# Patient Record
Sex: MALE | Race: WHITE | NOT HISPANIC OR LATINO | Employment: OTHER | URBAN - METROPOLITAN AREA
[De-identification: names, ages, dates, MRNs, and addresses within clinical notes are randomized per-mention and may not be internally consistent; named-entity substitution may affect disease eponyms.]

---

## 2021-05-27 ENCOUNTER — TELEPHONE (OUTPATIENT)
Dept: HEMATOLOGY/ONCOLOGY | Facility: CLINIC | Age: 86
End: 2021-05-27

## 2021-05-28 ENCOUNTER — OFFICE VISIT (OUTPATIENT)
Dept: RADIATION ONCOLOGY | Facility: CLINIC | Age: 86
End: 2021-05-28
Payer: COMMERCIAL

## 2021-05-28 ENCOUNTER — OFFICE VISIT (OUTPATIENT)
Dept: SURGERY | Facility: CLINIC | Age: 86
End: 2021-05-28
Payer: COMMERCIAL

## 2021-05-28 ENCOUNTER — LAB VISIT (OUTPATIENT)
Dept: LAB | Facility: HOSPITAL | Age: 86
End: 2021-05-28
Attending: COLON & RECTAL SURGERY
Payer: COMMERCIAL

## 2021-05-28 VITALS
DIASTOLIC BLOOD PRESSURE: 88 MMHG | WEIGHT: 180.13 LBS | BODY MASS INDEX: 24.4 KG/M2 | SYSTOLIC BLOOD PRESSURE: 179 MMHG | RESPIRATION RATE: 17 BRPM | TEMPERATURE: 97 F | HEIGHT: 72 IN | HEART RATE: 54 BPM

## 2021-05-28 VITALS
BODY MASS INDEX: 24.4 KG/M2 | SYSTOLIC BLOOD PRESSURE: 179 MMHG | WEIGHT: 180.13 LBS | HEART RATE: 54 BPM | HEIGHT: 72 IN | DIASTOLIC BLOOD PRESSURE: 88 MMHG

## 2021-05-28 DIAGNOSIS — C20 RECTAL CANCER: ICD-10-CM

## 2021-05-28 DIAGNOSIS — C20 RECTAL CANCER: Primary | ICD-10-CM

## 2021-05-28 LAB
CEA SERPL-MCNC: 3.5 NG/ML (ref 0–5)
CREAT SERPL-MCNC: 0.9 MG/DL (ref 0.5–1.4)
EST. GFR  (AFRICAN AMERICAN): >60 ML/MIN/1.73 M^2
EST. GFR  (NON AFRICAN AMERICAN): >60 ML/MIN/1.73 M^2

## 2021-05-28 PROCEDURE — 1126F AMNT PAIN NOTED NONE PRSNT: CPT | Mod: S$GLB,,, | Performed by: COLON & RECTAL SURGERY

## 2021-05-28 PROCEDURE — 3288F PR FALLS RISK ASSESSMENT DOCUMENTED: ICD-10-PCS | Mod: CPTII,S$GLB,, | Performed by: COLON & RECTAL SURGERY

## 2021-05-28 PROCEDURE — 1159F MED LIST DOCD IN RCRD: CPT | Mod: S$GLB,,, | Performed by: RADIOLOGY

## 2021-05-28 PROCEDURE — 82378 CARCINOEMBRYONIC ANTIGEN: CPT | Performed by: COLON & RECTAL SURGERY

## 2021-05-28 PROCEDURE — 3288F FALL RISK ASSESSMENT DOCD: CPT | Mod: CPTII,S$GLB,, | Performed by: RADIOLOGY

## 2021-05-28 PROCEDURE — 45330 PR SIGMOIDOSCOPY,DIAG2STIC: ICD-10-PCS | Mod: S$GLB,,, | Performed by: COLON & RECTAL SURGERY

## 2021-05-28 PROCEDURE — 99999 PR PBB SHADOW E&M-EST. PATIENT-LVL III: ICD-10-PCS | Mod: PBBFAC,,, | Performed by: RADIOLOGY

## 2021-05-28 PROCEDURE — 1101F PT FALLS ASSESS-DOCD LE1/YR: CPT | Mod: CPTII,S$GLB,, | Performed by: RADIOLOGY

## 2021-05-28 PROCEDURE — 3288F PR FALLS RISK ASSESSMENT DOCUMENTED: ICD-10-PCS | Mod: CPTII,S$GLB,, | Performed by: RADIOLOGY

## 2021-05-28 PROCEDURE — 99205 PR OFFICE/OUTPT VISIT, NEW, LEVL V, 60-74 MIN: ICD-10-PCS | Mod: 25,S$GLB,, | Performed by: COLON & RECTAL SURGERY

## 2021-05-28 PROCEDURE — 1101F PT FALLS ASSESS-DOCD LE1/YR: CPT | Mod: CPTII,S$GLB,, | Performed by: COLON & RECTAL SURGERY

## 2021-05-28 PROCEDURE — 99999 PR PBB SHADOW E&M-EST. PATIENT-LVL III: CPT | Mod: PBBFAC,,, | Performed by: RADIOLOGY

## 2021-05-28 PROCEDURE — 1101F PR PT FALLS ASSESS DOC 0-1 FALLS W/OUT INJ PAST YR: ICD-10-PCS | Mod: CPTII,S$GLB,, | Performed by: RADIOLOGY

## 2021-05-28 PROCEDURE — 1159F PR MEDICATION LIST DOCUMENTED IN MEDICAL RECORD: ICD-10-PCS | Mod: S$GLB,,, | Performed by: COLON & RECTAL SURGERY

## 2021-05-28 PROCEDURE — 99999 PR PBB SHADOW E&M-EST. PATIENT-LVL III: ICD-10-PCS | Mod: PBBFAC,,, | Performed by: COLON & RECTAL SURGERY

## 2021-05-28 PROCEDURE — 82565 ASSAY OF CREATININE: CPT | Performed by: COLON & RECTAL SURGERY

## 2021-05-28 PROCEDURE — 99999 PR PBB SHADOW E&M-EST. PATIENT-LVL III: CPT | Mod: PBBFAC,,, | Performed by: COLON & RECTAL SURGERY

## 2021-05-28 PROCEDURE — 1101F PR PT FALLS ASSESS DOC 0-1 FALLS W/OUT INJ PAST YR: ICD-10-PCS | Mod: CPTII,S$GLB,, | Performed by: COLON & RECTAL SURGERY

## 2021-05-28 PROCEDURE — 1159F MED LIST DOCD IN RCRD: CPT | Mod: S$GLB,,, | Performed by: COLON & RECTAL SURGERY

## 2021-05-28 PROCEDURE — 99205 OFFICE O/P NEW HI 60 MIN: CPT | Mod: 25,S$GLB,, | Performed by: COLON & RECTAL SURGERY

## 2021-05-28 PROCEDURE — 1126F PR PAIN SEVERITY QUANTIFIED, NO PAIN PRESENT: ICD-10-PCS | Mod: S$GLB,,, | Performed by: COLON & RECTAL SURGERY

## 2021-05-28 PROCEDURE — 99205 PR OFFICE/OUTPT VISIT, NEW, LEVL V, 60-74 MIN: ICD-10-PCS | Mod: S$GLB,,, | Performed by: RADIOLOGY

## 2021-05-28 PROCEDURE — 45330 DIAGNOSTIC SIGMOIDOSCOPY: CPT | Mod: S$GLB,,, | Performed by: COLON & RECTAL SURGERY

## 2021-05-28 PROCEDURE — 1159F PR MEDICATION LIST DOCUMENTED IN MEDICAL RECORD: ICD-10-PCS | Mod: S$GLB,,, | Performed by: RADIOLOGY

## 2021-05-28 PROCEDURE — 36415 COLL VENOUS BLD VENIPUNCTURE: CPT | Performed by: COLON & RECTAL SURGERY

## 2021-05-28 PROCEDURE — 3288F FALL RISK ASSESSMENT DOCD: CPT | Mod: CPTII,S$GLB,, | Performed by: COLON & RECTAL SURGERY

## 2021-05-28 PROCEDURE — 99205 OFFICE O/P NEW HI 60 MIN: CPT | Mod: S$GLB,,, | Performed by: RADIOLOGY

## 2021-05-28 RX ORDER — MECLIZINE HYDROCHLORIDE 25 MG/1
TABLET ORAL
COMMUNITY
End: 2021-06-14

## 2021-05-28 RX ORDER — MIRABEGRON 25 MG/1
TABLET, FILM COATED, EXTENDED RELEASE ORAL
COMMUNITY
End: 2021-06-14

## 2021-05-28 RX ORDER — ATORVASTATIN CALCIUM 40 MG/1
TABLET, FILM COATED ORAL
COMMUNITY
End: 2023-10-12

## 2021-05-28 RX ORDER — OLMESARTAN MEDOXOMIL 40 MG/1
40 TABLET ORAL DAILY
COMMUNITY
Start: 2021-03-27

## 2021-05-28 RX ORDER — METOPROLOL SUCCINATE 50 MG/1
50 TABLET, EXTENDED RELEASE ORAL DAILY
COMMUNITY
Start: 2021-05-11

## 2021-05-28 RX ORDER — SODIUM, POTASSIUM,MAG SULFATES 17.5-3.13G
1 SOLUTION, RECONSTITUTED, ORAL ORAL
COMMUNITY
Start: 2021-04-27 | End: 2021-06-14

## 2021-05-28 RX ORDER — ATORVASTATIN CALCIUM 40 MG/1
TABLET, FILM COATED ORAL
COMMUNITY
Start: 2021-02-07 | End: 2021-06-03 | Stop reason: SDUPTHER

## 2021-05-28 RX ORDER — IPRATROPIUM BROMIDE 42 UG/1
SPRAY, METERED NASAL
COMMUNITY
End: 2021-06-14

## 2021-05-28 RX ORDER — LEVOTHYROXINE SODIUM 50 UG/1
TABLET ORAL
COMMUNITY
End: 2023-10-12

## 2021-05-28 RX ORDER — HYDROCHLOROTHIAZIDE 12.5 MG/1
TABLET ORAL
COMMUNITY
End: 2021-06-14

## 2021-05-28 RX ORDER — ZOLPIDEM TARTRATE 10 MG/1
10 TABLET ORAL NIGHTLY
COMMUNITY
Start: 2021-05-08

## 2021-05-28 RX ORDER — DIGOXIN 250 MCG
TABLET ORAL
COMMUNITY
End: 2021-06-14

## 2021-06-01 ENCOUNTER — HOSPITAL ENCOUNTER (OUTPATIENT)
Dept: RADIOLOGY | Facility: HOSPITAL | Age: 86
Discharge: HOME | End: 2021-06-01
Attending: COLON & RECTAL SURGERY
Payer: COMMERCIAL

## 2021-06-01 DIAGNOSIS — C20 RECTAL CANCER: ICD-10-CM

## 2021-06-01 PROCEDURE — 74177 CT ABD & PELVIS W/CONTRAST: CPT | Mod: TC

## 2021-06-01 PROCEDURE — 74177 CT CHEST ABDOMEN PELVIS WITH CONTRAST (XPD): ICD-10-PCS | Mod: 26,,, | Performed by: RADIOLOGY

## 2021-06-01 PROCEDURE — 25500020 PHARM REV CODE 255: Performed by: COLON & RECTAL SURGERY

## 2021-06-01 PROCEDURE — 72195 MRI RECTAL CANCER WITHOUT CONTRAST: ICD-10-PCS | Mod: 26,,, | Performed by: RADIOLOGY

## 2021-06-01 PROCEDURE — 72195 MRI PELVIS W/O DYE: CPT | Mod: TC

## 2021-06-01 PROCEDURE — 71260 CT THORAX DX C+: CPT | Mod: 26,,, | Performed by: RADIOLOGY

## 2021-06-01 PROCEDURE — 71260 CT CHEST ABDOMEN PELVIS WITH CONTRAST (XPD): ICD-10-PCS | Mod: 26,,, | Performed by: RADIOLOGY

## 2021-06-01 PROCEDURE — 72195 MRI PELVIS W/O DYE: CPT | Mod: 26,,, | Performed by: RADIOLOGY

## 2021-06-01 PROCEDURE — 74177 CT ABD & PELVIS W/CONTRAST: CPT | Mod: 26,,, | Performed by: RADIOLOGY

## 2021-06-01 RX ADMIN — IOHEXOL 100 ML: 350 INJECTION, SOLUTION INTRAVENOUS at 04:06

## 2021-06-01 RX ADMIN — IOHEXOL 15 ML: 350 INJECTION, SOLUTION INTRAVENOUS at 03:06

## 2021-06-03 ENCOUNTER — OFFICE VISIT (OUTPATIENT)
Dept: HEMATOLOGY/ONCOLOGY | Facility: CLINIC | Age: 86
End: 2021-06-03
Payer: COMMERCIAL

## 2021-06-03 VITALS
HEIGHT: 72 IN | BODY MASS INDEX: 24.38 KG/M2 | RESPIRATION RATE: 18 BRPM | HEART RATE: 58 BPM | WEIGHT: 180 LBS | DIASTOLIC BLOOD PRESSURE: 77 MMHG | SYSTOLIC BLOOD PRESSURE: 173 MMHG | OXYGEN SATURATION: 99 % | TEMPERATURE: 98 F

## 2021-06-03 DIAGNOSIS — I10 ESSENTIAL HYPERTENSION: ICD-10-CM

## 2021-06-03 DIAGNOSIS — C20 RECTAL CANCER: Primary | ICD-10-CM

## 2021-06-03 DIAGNOSIS — C77.5 SECONDARY MALIGNANT NEOPLASM OF INTRAPELVIC LYMPH NODES: ICD-10-CM

## 2021-06-03 DIAGNOSIS — D84.81 IMMUNODEFICIENCY SECONDARY TO NEOPLASM: ICD-10-CM

## 2021-06-03 DIAGNOSIS — D49.9 IMMUNODEFICIENCY SECONDARY TO NEOPLASM: ICD-10-CM

## 2021-06-03 PROCEDURE — 1101F PR PT FALLS ASSESS DOC 0-1 FALLS W/OUT INJ PAST YR: ICD-10-PCS | Mod: CPTII,S$GLB,, | Performed by: INTERNAL MEDICINE

## 2021-06-03 PROCEDURE — 1159F MED LIST DOCD IN RCRD: CPT | Mod: S$GLB,,, | Performed by: INTERNAL MEDICINE

## 2021-06-03 PROCEDURE — 1159F PR MEDICATION LIST DOCUMENTED IN MEDICAL RECORD: ICD-10-PCS | Mod: S$GLB,,, | Performed by: INTERNAL MEDICINE

## 2021-06-03 PROCEDURE — 99205 PR OFFICE/OUTPT VISIT, NEW, LEVL V, 60-74 MIN: ICD-10-PCS | Mod: S$GLB,,, | Performed by: INTERNAL MEDICINE

## 2021-06-03 PROCEDURE — 3288F FALL RISK ASSESSMENT DOCD: CPT | Mod: CPTII,S$GLB,, | Performed by: INTERNAL MEDICINE

## 2021-06-03 PROCEDURE — 99205 OFFICE O/P NEW HI 60 MIN: CPT | Mod: S$GLB,,, | Performed by: INTERNAL MEDICINE

## 2021-06-03 PROCEDURE — 1126F AMNT PAIN NOTED NONE PRSNT: CPT | Mod: S$GLB,,, | Performed by: INTERNAL MEDICINE

## 2021-06-03 PROCEDURE — 1126F PR PAIN SEVERITY QUANTIFIED, NO PAIN PRESENT: ICD-10-PCS | Mod: S$GLB,,, | Performed by: INTERNAL MEDICINE

## 2021-06-03 PROCEDURE — 99999 PR PBB SHADOW E&M-EST. PATIENT-LVL IV: CPT | Mod: PBBFAC,,, | Performed by: INTERNAL MEDICINE

## 2021-06-03 PROCEDURE — 3288F PR FALLS RISK ASSESSMENT DOCUMENTED: ICD-10-PCS | Mod: CPTII,S$GLB,, | Performed by: INTERNAL MEDICINE

## 2021-06-03 PROCEDURE — 1101F PT FALLS ASSESS-DOCD LE1/YR: CPT | Mod: CPTII,S$GLB,, | Performed by: INTERNAL MEDICINE

## 2021-06-03 PROCEDURE — 99999 PR PBB SHADOW E&M-EST. PATIENT-LVL IV: ICD-10-PCS | Mod: PBBFAC,,, | Performed by: INTERNAL MEDICINE

## 2021-06-04 ENCOUNTER — TELEPHONE (OUTPATIENT)
Dept: SURGERY | Facility: CLINIC | Age: 86
End: 2021-06-04

## 2021-06-08 ENCOUNTER — HOSPITAL ENCOUNTER (OUTPATIENT)
Dept: RADIATION THERAPY | Facility: HOSPITAL | Age: 86
Discharge: HOME | End: 2021-06-08
Attending: RADIOLOGY
Payer: COMMERCIAL

## 2021-06-08 PROCEDURE — 77014 HC CT GUIDANCE RADIATION THERAPY FLDS PLACEMENT: CPT | Mod: TC | Performed by: RADIOLOGY

## 2021-06-08 PROCEDURE — 77334 PR  RADN TREATMENT AID(S) COMPLX: ICD-10-PCS | Mod: 26,,, | Performed by: RADIOLOGY

## 2021-06-08 PROCEDURE — 77263 THER RADIOLOGY TX PLNG CPLX: CPT | Mod: ,,, | Performed by: RADIOLOGY

## 2021-06-08 PROCEDURE — 77290 THER RAD SIMULAJ FIELD CPLX: CPT | Mod: TC | Performed by: RADIOLOGY

## 2021-06-08 PROCEDURE — 77334 RADIATION TREATMENT AID(S): CPT | Mod: 26,,, | Performed by: RADIOLOGY

## 2021-06-08 PROCEDURE — 77263 PR  RADIATION THERAPY PLAN COMPLEX: ICD-10-PCS | Mod: ,,, | Performed by: RADIOLOGY

## 2021-06-08 PROCEDURE — 77334 RADIATION TREATMENT AID(S): CPT | Mod: TC | Performed by: RADIOLOGY

## 2021-06-09 PROCEDURE — 77301 RADIOTHERAPY DOSE PLAN IMRT: CPT | Mod: 26,,, | Performed by: RADIOLOGY

## 2021-06-09 PROCEDURE — 77301 PR  INTEN MOD RADIOTHER PLAN W/DOSE VOL HIST: ICD-10-PCS | Mod: 26,,, | Performed by: RADIOLOGY

## 2021-06-09 PROCEDURE — 77301 RADIOTHERAPY DOSE PLAN IMRT: CPT | Mod: TC | Performed by: RADIOLOGY

## 2021-06-10 PROCEDURE — 77338 DESIGN MLC DEVICE FOR IMRT: CPT | Mod: 26,,, | Performed by: RADIOLOGY

## 2021-06-10 PROCEDURE — 77300 RADIATION THERAPY DOSE PLAN: CPT | Mod: TC | Performed by: RADIOLOGY

## 2021-06-10 PROCEDURE — 77338 DESIGN MLC DEVICE FOR IMRT: CPT | Mod: TC | Performed by: RADIOLOGY

## 2021-06-10 PROCEDURE — 77338 PR  MLC IMRT DESIGN & CONSTRUCTION PER IMRT PLAN: ICD-10-PCS | Mod: 26,,, | Performed by: RADIOLOGY

## 2021-06-10 PROCEDURE — 77300 RADIATION THERAPY DOSE PLAN: CPT | Mod: 26,,, | Performed by: RADIOLOGY

## 2021-06-10 PROCEDURE — 77300 PR RADIATION THERAPY,DOSIMETRY PLAN: ICD-10-PCS | Mod: 26,,, | Performed by: RADIOLOGY

## 2021-06-14 ENCOUNTER — HOSPITAL ENCOUNTER (OUTPATIENT)
Dept: INTERVENTIONAL RADIOLOGY/VASCULAR | Facility: HOSPITAL | Age: 86
Discharge: HOME | End: 2021-06-14
Attending: INTERNAL MEDICINE
Payer: COMMERCIAL

## 2021-06-14 VITALS
RESPIRATION RATE: 16 BRPM | HEIGHT: 72 IN | BODY MASS INDEX: 23.43 KG/M2 | DIASTOLIC BLOOD PRESSURE: 62 MMHG | TEMPERATURE: 99 F | WEIGHT: 173 LBS | OXYGEN SATURATION: 100 % | HEART RATE: 79 BPM | SYSTOLIC BLOOD PRESSURE: 131 MMHG

## 2021-06-14 DIAGNOSIS — C20 RECTAL CANCER: ICD-10-CM

## 2021-06-14 LAB
ANION GAP SERPL CALC-SCNC: 7 MMOL/L (ref 8–16)
BASOPHILS # BLD AUTO: 0.08 K/UL (ref 0–0.2)
BASOPHILS NFR BLD: 1.6 % (ref 0–1.9)
BUN SERPL-MCNC: 9 MG/DL (ref 8–23)
CALCIUM SERPL-MCNC: 8.7 MG/DL (ref 8.7–10.5)
CHLORIDE SERPL-SCNC: 105 MMOL/L (ref 95–110)
CO2 SERPL-SCNC: 29 MMOL/L (ref 23–29)
CREAT SERPL-MCNC: 1 MG/DL (ref 0.5–1.4)
DIFFERENTIAL METHOD: ABNORMAL
EOSINOPHIL # BLD AUTO: 0.3 K/UL (ref 0–0.5)
EOSINOPHIL NFR BLD: 5 % (ref 0–8)
ERYTHROCYTE [DISTWIDTH] IN BLOOD BY AUTOMATED COUNT: 12.8 % (ref 11.5–14.5)
EST. GFR  (AFRICAN AMERICAN): >60 ML/MIN/1.73 M^2
EST. GFR  (NON AFRICAN AMERICAN): >60 ML/MIN/1.73 M^2
GLUCOSE SERPL-MCNC: 118 MG/DL (ref 70–110)
HCT VFR BLD AUTO: 45.5 % (ref 40–54)
HGB BLD-MCNC: 15 G/DL (ref 14–18)
IMM GRANULOCYTES # BLD AUTO: 0.02 K/UL (ref 0–0.04)
IMM GRANULOCYTES NFR BLD AUTO: 0.4 % (ref 0–0.5)
INR PPP: 1.1 (ref 0.8–1.2)
LYMPHOCYTES # BLD AUTO: 0.6 K/UL (ref 1–4.8)
LYMPHOCYTES NFR BLD: 12.6 % (ref 18–48)
MCH RBC QN AUTO: 30.7 PG (ref 27–31)
MCHC RBC AUTO-ENTMCNC: 33 G/DL (ref 32–36)
MCV RBC AUTO: 93 FL (ref 82–98)
MONOCYTES # BLD AUTO: 0.8 K/UL (ref 0.3–1)
MONOCYTES NFR BLD: 15.8 % (ref 4–15)
NEUTROPHILS # BLD AUTO: 3.2 K/UL (ref 1.8–7.7)
NEUTROPHILS NFR BLD: 64.6 % (ref 38–73)
NRBC BLD-RTO: 0 /100 WBC
PLATELET # BLD AUTO: 220 K/UL (ref 150–450)
PLATELET BLD QL SMEAR: ABNORMAL
PMV BLD AUTO: ABNORMAL FL (ref 9.2–12.9)
POIKILOCYTOSIS BLD QL SMEAR: SLIGHT
POTASSIUM SERPL-SCNC: 4.1 MMOL/L (ref 3.5–5.1)
PROTHROMBIN TIME: 11.2 SEC (ref 9–12.5)
RBC # BLD AUTO: 4.88 M/UL (ref 4.6–6.2)
SODIUM SERPL-SCNC: 141 MMOL/L (ref 136–145)
WBC # BLD AUTO: 5.01 K/UL (ref 3.9–12.7)

## 2021-06-14 PROCEDURE — C1894 INTRO/SHEATH, NON-LASER: HCPCS

## 2021-06-14 PROCEDURE — 99152 MOD SED SAME PHYS/QHP 5/>YRS: CPT | Mod: ,,, | Performed by: RADIOLOGY

## 2021-06-14 PROCEDURE — 63600175 PHARM REV CODE 636 W HCPCS: Performed by: RADIOLOGY

## 2021-06-14 PROCEDURE — 99153 MOD SED SAME PHYS/QHP EA: CPT | Performed by: RADIOLOGY

## 2021-06-14 PROCEDURE — 80048 BASIC METABOLIC PNL TOTAL CA: CPT | Performed by: INTERNAL MEDICINE

## 2021-06-14 PROCEDURE — 85025 COMPLETE CBC W/AUTO DIFF WBC: CPT | Performed by: INTERNAL MEDICINE

## 2021-06-14 PROCEDURE — 99152 MOD SED SAME PHYS/QHP 5/>YRS: CPT

## 2021-06-14 PROCEDURE — 36561 INSERT TUNNELED CV CATH: CPT | Performed by: RADIOLOGY

## 2021-06-14 PROCEDURE — 99152 PR MOD CONSCIOUS SEDATION, SAME PHYS, 5+ YRS, FIRST 15 MIN: ICD-10-PCS | Mod: ,,, | Performed by: RADIOLOGY

## 2021-06-14 PROCEDURE — 36561 IR TUNNELED CATH PLACEMENT WITH PORT: ICD-10-PCS | Mod: RT,,, | Performed by: RADIOLOGY

## 2021-06-14 PROCEDURE — 76937 US GUIDE VASCULAR ACCESS: CPT | Mod: TC | Performed by: RADIOLOGY

## 2021-06-14 PROCEDURE — A4550 SURGICAL TRAYS: HCPCS

## 2021-06-14 PROCEDURE — 99153 MOD SED SAME PHYS/QHP EA: CPT

## 2021-06-14 PROCEDURE — 85610 PROTHROMBIN TIME: CPT | Performed by: INTERNAL MEDICINE

## 2021-06-14 PROCEDURE — 76937 US GUIDE VASCULAR ACCESS: CPT | Mod: 26,,, | Performed by: RADIOLOGY

## 2021-06-14 PROCEDURE — 25000003 PHARM REV CODE 250: Performed by: RADIOLOGY

## 2021-06-14 PROCEDURE — 77001 FLUOROGUIDE FOR VEIN DEVICE: CPT | Mod: TC | Performed by: RADIOLOGY

## 2021-06-14 PROCEDURE — 76937 PR  US GUIDE, VASCULAR ACCESS: ICD-10-PCS | Mod: 26,,, | Performed by: RADIOLOGY

## 2021-06-14 PROCEDURE — 36415 COLL VENOUS BLD VENIPUNCTURE: CPT | Performed by: INTERNAL MEDICINE

## 2021-06-14 PROCEDURE — 77001 FLUOROGUIDE FOR VEIN DEVICE: CPT | Mod: 26,,, | Performed by: RADIOLOGY

## 2021-06-14 PROCEDURE — 99152 MOD SED SAME PHYS/QHP 5/>YRS: CPT | Performed by: RADIOLOGY

## 2021-06-14 PROCEDURE — 77001 CHG FLUOROGUIDE CNTRL VEN ACCESS,PLACE,REPLACE,REMOVE: ICD-10-PCS | Mod: 26,,, | Performed by: RADIOLOGY

## 2021-06-14 RX ORDER — MORPHINE SULFATE 10 MG/ML
2 INJECTION, SOLUTION INTRAMUSCULAR; INTRAVENOUS
Status: DISCONTINUED | OUTPATIENT
Start: 2021-06-14 | End: 2021-06-15 | Stop reason: HOSPADM

## 2021-06-14 RX ORDER — CEFAZOLIN SODIUM 2 G/50ML
2 SOLUTION INTRAVENOUS
Status: COMPLETED | OUTPATIENT
Start: 2021-06-14 | End: 2021-06-14

## 2021-06-14 RX ORDER — LIDOCAINE HYDROCHLORIDE 10 MG/ML
INJECTION INFILTRATION; PERINEURAL CODE/TRAUMA/SEDATION MEDICATION
Status: COMPLETED | OUTPATIENT
Start: 2021-06-14 | End: 2021-06-14

## 2021-06-14 RX ORDER — HEPARIN 100 UNIT/ML
SYRINGE INTRAVENOUS CODE/TRAUMA/SEDATION MEDICATION
Status: COMPLETED | OUTPATIENT
Start: 2021-06-14 | End: 2021-06-14

## 2021-06-14 RX ORDER — SODIUM CHLORIDE 9 MG/ML
INJECTION, SOLUTION INTRAVENOUS CONTINUOUS
Status: DISCONTINUED | OUTPATIENT
Start: 2021-06-14 | End: 2021-06-15 | Stop reason: HOSPADM

## 2021-06-14 RX ORDER — FENTANYL CITRATE 50 UG/ML
INJECTION, SOLUTION INTRAMUSCULAR; INTRAVENOUS CODE/TRAUMA/SEDATION MEDICATION
Status: COMPLETED | OUTPATIENT
Start: 2021-06-14 | End: 2021-06-14

## 2021-06-14 RX ORDER — MIDAZOLAM HYDROCHLORIDE 5 MG/ML
INJECTION INTRAMUSCULAR; INTRAVENOUS CODE/TRAUMA/SEDATION MEDICATION
Status: COMPLETED | OUTPATIENT
Start: 2021-06-14 | End: 2021-06-14

## 2021-06-14 RX ORDER — HYDROCODONE BITARTRATE AND ACETAMINOPHEN 5; 325 MG/1; MG/1
1 TABLET ORAL EVERY 4 HOURS PRN
Status: DISCONTINUED | OUTPATIENT
Start: 2021-06-14 | End: 2021-06-15 | Stop reason: HOSPADM

## 2021-06-14 RX ADMIN — FENTANYL CITRATE 50 MCG: 50 INJECTION, SOLUTION INTRAMUSCULAR; INTRAVENOUS at 09:06

## 2021-06-14 RX ADMIN — HEPARIN 5 ML: 100 SYRINGE at 10:06

## 2021-06-14 RX ADMIN — CEFAZOLIN SODIUM 2 G: 2 SOLUTION INTRAVENOUS at 08:06

## 2021-06-14 RX ADMIN — SODIUM CHLORIDE 20 ML/HR: 0.9 INJECTION, SOLUTION INTRAVENOUS at 08:06

## 2021-06-14 RX ADMIN — MIDAZOLAM HYDROCHLORIDE 1 MG: 5 INJECTION, SOLUTION INTRAMUSCULAR; INTRAVENOUS at 09:06

## 2021-06-14 RX ADMIN — LIDOCAINE HYDROCHLORIDE 10 ML: 10 INJECTION, SOLUTION INFILTRATION; PERINEURAL at 09:06

## 2021-06-15 PROCEDURE — 77014 PR  CT GUIDANCE PLACEMENT RAD THERAPY FIELDS: ICD-10-PCS | Mod: 26,,, | Performed by: RADIOLOGY

## 2021-06-15 PROCEDURE — 77014 HC CT GUIDANCE RADIATION THERAPY FLDS PLACEMENT: CPT | Mod: TC | Performed by: RADIOLOGY

## 2021-06-15 PROCEDURE — 77014 PR  CT GUIDANCE PLACEMENT RAD THERAPY FIELDS: CPT | Mod: 26,,, | Performed by: RADIOLOGY

## 2021-06-15 PROCEDURE — 77386 HC IMRT, COMPLEX: CPT | Performed by: RADIOLOGY

## 2021-06-16 PROCEDURE — 77014 PR  CT GUIDANCE PLACEMENT RAD THERAPY FIELDS: CPT | Mod: 26,,, | Performed by: RADIOLOGY

## 2021-06-16 PROCEDURE — 77386 HC IMRT, COMPLEX: CPT | Performed by: RADIOLOGY

## 2021-06-16 PROCEDURE — 77014 HC CT GUIDANCE RADIATION THERAPY FLDS PLACEMENT: CPT | Mod: TC | Performed by: RADIOLOGY

## 2021-06-16 PROCEDURE — 77014 PR  CT GUIDANCE PLACEMENT RAD THERAPY FIELDS: ICD-10-PCS | Mod: 26,,, | Performed by: RADIOLOGY

## 2021-06-17 PROCEDURE — 77014 PR  CT GUIDANCE PLACEMENT RAD THERAPY FIELDS: ICD-10-PCS | Mod: 26,,, | Performed by: RADIOLOGY

## 2021-06-17 PROCEDURE — 77014 HC CT GUIDANCE RADIATION THERAPY FLDS PLACEMENT: CPT | Mod: TC | Performed by: RADIOLOGY

## 2021-06-17 PROCEDURE — 77386 HC IMRT, COMPLEX: CPT | Performed by: RADIOLOGY

## 2021-06-17 PROCEDURE — 77014 PR  CT GUIDANCE PLACEMENT RAD THERAPY FIELDS: CPT | Mod: 26,,, | Performed by: RADIOLOGY

## 2021-06-18 PROCEDURE — 77014 PR  CT GUIDANCE PLACEMENT RAD THERAPY FIELDS: CPT | Mod: 26,,, | Performed by: RADIOLOGY

## 2021-06-18 PROCEDURE — 77386 HC IMRT, COMPLEX: CPT | Performed by: RADIOLOGY

## 2021-06-18 PROCEDURE — 77014 HC CT GUIDANCE RADIATION THERAPY FLDS PLACEMENT: CPT | Mod: TC | Performed by: RADIOLOGY

## 2021-06-18 PROCEDURE — 77014 PR  CT GUIDANCE PLACEMENT RAD THERAPY FIELDS: ICD-10-PCS | Mod: 26,,, | Performed by: RADIOLOGY

## 2021-06-21 PROCEDURE — 77014 PR  CT GUIDANCE PLACEMENT RAD THERAPY FIELDS: CPT | Mod: 26,,, | Performed by: RADIOLOGY

## 2021-06-21 PROCEDURE — 77014 PR  CT GUIDANCE PLACEMENT RAD THERAPY FIELDS: ICD-10-PCS | Mod: 26,,, | Performed by: RADIOLOGY

## 2021-06-21 PROCEDURE — 77014 HC CT GUIDANCE RADIATION THERAPY FLDS PLACEMENT: CPT | Mod: TC | Performed by: RADIOLOGY

## 2021-06-21 PROCEDURE — 77386 HC IMRT, COMPLEX: CPT | Performed by: RADIOLOGY

## 2021-06-22 PROCEDURE — 77336 RADIATION PHYSICS CONSULT: CPT | Performed by: RADIOLOGY

## 2021-06-28 ENCOUNTER — TELEPHONE (OUTPATIENT)
Dept: RADIATION ONCOLOGY | Facility: CLINIC | Age: 86
End: 2021-06-28

## 2021-07-09 ENCOUNTER — TELEPHONE (OUTPATIENT)
Dept: RADIATION ONCOLOGY | Facility: CLINIC | Age: 86
End: 2021-07-09

## 2021-07-18 ENCOUNTER — PATIENT MESSAGE (OUTPATIENT)
Dept: RADIATION ONCOLOGY | Facility: CLINIC | Age: 86
End: 2021-07-18

## 2021-08-31 ENCOUNTER — PATIENT MESSAGE (OUTPATIENT)
Dept: HEMATOLOGY/ONCOLOGY | Facility: CLINIC | Age: 86
End: 2021-08-31

## 2021-11-24 ENCOUNTER — TELEPHONE (OUTPATIENT)
Dept: SURGERY | Facility: CLINIC | Age: 86
End: 2021-11-24
Payer: COMMERCIAL

## 2021-12-15 ENCOUNTER — OFFICE VISIT (OUTPATIENT)
Dept: SURGERY | Facility: CLINIC | Age: 86
End: 2021-12-15
Payer: COMMERCIAL

## 2021-12-15 VITALS
SYSTOLIC BLOOD PRESSURE: 149 MMHG | HEART RATE: 57 BPM | HEIGHT: 72 IN | DIASTOLIC BLOOD PRESSURE: 66 MMHG | WEIGHT: 157.19 LBS | BODY MASS INDEX: 21.29 KG/M2

## 2021-12-15 DIAGNOSIS — C20 RECTAL CANCER: Primary | ICD-10-CM

## 2021-12-15 PROCEDURE — 99215 PR OFFICE/OUTPT VISIT, EST, LEVL V, 40-54 MIN: ICD-10-PCS | Mod: 25,S$GLB,, | Performed by: COLON & RECTAL SURGERY

## 2021-12-15 PROCEDURE — 99215 OFFICE O/P EST HI 40 MIN: CPT | Mod: 25,S$GLB,, | Performed by: COLON & RECTAL SURGERY

## 2021-12-15 PROCEDURE — 99999 PR PBB SHADOW E&M-EST. PATIENT-LVL III: ICD-10-PCS | Mod: PBBFAC,,, | Performed by: COLON & RECTAL SURGERY

## 2021-12-15 PROCEDURE — 45330 DIAGNOSTIC SIGMOIDOSCOPY: CPT | Mod: S$GLB,,, | Performed by: COLON & RECTAL SURGERY

## 2021-12-15 PROCEDURE — 99999 PR PBB SHADOW E&M-EST. PATIENT-LVL III: CPT | Mod: PBBFAC,,, | Performed by: COLON & RECTAL SURGERY

## 2021-12-15 PROCEDURE — 45330 PR SIGMOIDOSCOPY,DIAG2STIC: ICD-10-PCS | Mod: S$GLB,,, | Performed by: COLON & RECTAL SURGERY

## 2021-12-22 ENCOUNTER — OFFICE VISIT (OUTPATIENT)
Dept: SURGERY | Facility: CLINIC | Age: 86
End: 2021-12-22
Payer: COMMERCIAL

## 2021-12-22 ENCOUNTER — PATIENT MESSAGE (OUTPATIENT)
Dept: SURGERY | Facility: CLINIC | Age: 86
End: 2021-12-22
Payer: COMMERCIAL

## 2021-12-22 VITALS
HEIGHT: 72 IN | WEIGHT: 155 LBS | DIASTOLIC BLOOD PRESSURE: 65 MMHG | BODY MASS INDEX: 20.99 KG/M2 | SYSTOLIC BLOOD PRESSURE: 122 MMHG

## 2021-12-22 DIAGNOSIS — C20 RECTAL CANCER: Primary | ICD-10-CM

## 2021-12-22 PROCEDURE — 3078F PR MOST RECENT DIASTOLIC BLOOD PRESSURE < 80 MM HG: ICD-10-PCS | Mod: CPTII,S$GLB,, | Performed by: COLON & RECTAL SURGERY

## 2021-12-22 PROCEDURE — 99214 OFFICE O/P EST MOD 30 MIN: CPT | Mod: S$GLB,,, | Performed by: COLON & RECTAL SURGERY

## 2021-12-22 PROCEDURE — 1126F PR PAIN SEVERITY QUANTIFIED, NO PAIN PRESENT: ICD-10-PCS | Mod: CPTII,S$GLB,, | Performed by: COLON & RECTAL SURGERY

## 2021-12-22 PROCEDURE — 3074F PR MOST RECENT SYSTOLIC BLOOD PRESSURE < 130 MM HG: ICD-10-PCS | Mod: CPTII,S$GLB,, | Performed by: COLON & RECTAL SURGERY

## 2021-12-22 PROCEDURE — 1101F PT FALLS ASSESS-DOCD LE1/YR: CPT | Mod: CPTII,S$GLB,, | Performed by: COLON & RECTAL SURGERY

## 2021-12-22 PROCEDURE — 1126F AMNT PAIN NOTED NONE PRSNT: CPT | Mod: CPTII,S$GLB,, | Performed by: COLON & RECTAL SURGERY

## 2021-12-22 PROCEDURE — 99214 PR OFFICE/OUTPT VISIT, EST, LEVL IV, 30-39 MIN: ICD-10-PCS | Mod: S$GLB,,, | Performed by: COLON & RECTAL SURGERY

## 2021-12-22 PROCEDURE — 3074F SYST BP LT 130 MM HG: CPT | Mod: CPTII,S$GLB,, | Performed by: COLON & RECTAL SURGERY

## 2021-12-22 PROCEDURE — 3078F DIAST BP <80 MM HG: CPT | Mod: CPTII,S$GLB,, | Performed by: COLON & RECTAL SURGERY

## 2021-12-22 PROCEDURE — 99999 PR PBB SHADOW E&M-EST. PATIENT-LVL III: ICD-10-PCS | Mod: PBBFAC,,, | Performed by: COLON & RECTAL SURGERY

## 2021-12-22 PROCEDURE — 3288F PR FALLS RISK ASSESSMENT DOCUMENTED: ICD-10-PCS | Mod: CPTII,S$GLB,, | Performed by: COLON & RECTAL SURGERY

## 2021-12-22 PROCEDURE — 3288F FALL RISK ASSESSMENT DOCD: CPT | Mod: CPTII,S$GLB,, | Performed by: COLON & RECTAL SURGERY

## 2021-12-22 PROCEDURE — 1101F PR PT FALLS ASSESS DOC 0-1 FALLS W/OUT INJ PAST YR: ICD-10-PCS | Mod: CPTII,S$GLB,, | Performed by: COLON & RECTAL SURGERY

## 2021-12-22 PROCEDURE — 99999 PR PBB SHADOW E&M-EST. PATIENT-LVL III: CPT | Mod: PBBFAC,,, | Performed by: COLON & RECTAL SURGERY

## 2021-12-29 ENCOUNTER — DOCUMENTATION ONLY (OUTPATIENT)
Dept: SURGERY | Facility: HOSPITAL | Age: 86
End: 2021-12-29
Payer: COMMERCIAL

## 2022-01-05 ENCOUNTER — OFFICE VISIT (OUTPATIENT)
Dept: SURGERY | Facility: CLINIC | Age: 87
End: 2022-01-05
Payer: COMMERCIAL

## 2022-01-05 ENCOUNTER — PATIENT MESSAGE (OUTPATIENT)
Dept: SURGERY | Facility: CLINIC | Age: 87
End: 2022-01-05
Payer: COMMERCIAL

## 2022-01-05 DIAGNOSIS — C20 RECTAL CANCER: Primary | ICD-10-CM

## 2022-01-05 DIAGNOSIS — Z08 ENCOUNTER FOR FOLLOW-UP SURVEILLANCE OF RECTAL CANCER: ICD-10-CM

## 2022-01-05 DIAGNOSIS — Z85.048 ENCOUNTER FOR FOLLOW-UP SURVEILLANCE OF RECTAL CANCER: ICD-10-CM

## 2022-01-05 PROCEDURE — 99214 OFFICE O/P EST MOD 30 MIN: CPT | Mod: 95,,, | Performed by: COLON & RECTAL SURGERY

## 2022-01-05 PROCEDURE — 99214 PR OFFICE/OUTPT VISIT, EST, LEVL IV, 30-39 MIN: ICD-10-PCS | Mod: 95,,, | Performed by: COLON & RECTAL SURGERY

## 2022-01-05 NOTE — Clinical Note
MRI and flex sigmoidoscopy in 3 months.  CT scan in 6 months and then alternate each imaging every other three months.   CEA every 3 months Flex sigmoidoscopy 3 months.   [place] [facility] [place]

## 2022-01-05 NOTE — PROGRESS NOTES
HPI:  Prosper Soliz is a 86 y.o. male with history of locally advanced rectal adenocarcinoma                     Oncology History      Rectal cancer      5/28/2021 Initial Diagnosis        Rectal cancer         6/1/2021 Cancer Staged        Staging form: Colon and Rectum, AJCC 8th Edition  - Clinical stage from 6/1/2021: Stage IIIB (cT3, cN1b, cM0)         6/15/2021 Notable Event        He was diagnosed after noting increasing stool frequency with fecal urgency as well as BRBPR. He underwent colonoscopy on 5/18/21 which noted a single sessile 6 mm polyp in the hepatic flexure, and ulcerated mass in the proximal rectum and mid rectum between 7-10 cm from the anus with biopsy demonstrating invasive moderately differentiated adenocarcinoma, MMR proficient.  MRI rectum with without contrast done on 06/01/2021 showed low/mid rectal tumor with less than 50% circumferential involvement and extension through the muscularis propria into the mesorectal fat, up to 2 abnormal mesorectal fat lymph nodes.  Patient was clinically diagnosed with cT3 cN1b Mx.  CT chest/abdomen/pelvis with contrast done on 06/01/2021 has shown subcentimeter pulmonary nodules [need f-up], no pathologically enlarged adenopathy within the chest, liver was normal, prominent mesenteric lymph nodes.  Evaluated by Dr. Dominick Boland on 06/03/2021 and noted recommendations for total neoadjuvant therapy with short course RT followed by 9 cycles of FOLFOX [given patient's age noted recommendation to drop bolus 5 FU].  Will need systemic imaging after 4 cycles of chemo.  And CT chest/abdomen and MRI rectum protocol 1 month after completion of FOLFOX.         6/15/2021 - 6/21/2021 Radiation Therapy        Treating physician: Michael Hernandez MD  Total Dose: 25 Gy  Fractions: 5     Treatment Summary  Course: C1 Pelvis 2021     Treatment Site Ref. ID Energy Dose/Fx (Gy) #Fx Dose Correction (Gy) Total Dose (Gy) Start Date End Date Elapsed Days   IM PELVIS PTV fior 6X 5  5 / 5 0 25 6/15/2021 6/21/2021 6          7/7/2021 -  Chemotherapy        Treatment Summary   Plan Name: OP FOLFOX 6 Q2W  Treatment Goal: Curative  Status: Active  Start Date: 7/7/2021  End Date: 11/19/2021 (Planned)  Provider: Nusrat Bar MD  Chemotherapy: fluorouraciL 2,400 mg/m2 = 4,870 mg in sodium chloride 0.9% 101.2 mL chemo infusion, 2,400 mg/m2 = 4,870 mg, Intravenous, Over 46 hours, 5 of 9 cycles  Administration: 4,870 mg (7/7/2021), 4,870 mg (7/20/2021), 4,870 mg (8/16/2021), 4,870 mg (9/8/2021), 4,870 mg (9/22/2021)  leucovorin calcium 400 mg/m2 = 810 mg in dextrose 5 % 290.5 mL infusion, 400 mg/m2 = 810 mg, Intravenous, Clinic/HOD 1 time, 5 of 9 cycles  Administration: 810 mg (7/7/2021), 810 mg (7/20/2021), 810 mg (8/16/2021), 810 mg (9/8/2021), 810 mg (9/22/2021)  oxaliplatin (ELOXATIN) 85 mg/m2 = 173 mg in dextrose 5 % 534.6 mL chemo infusion, 85 mg/m2 = 173 mg, Intravenous, Clinic/HOD 1 time, 5 of 9 cycles  Dose modification: 65 mg/m2 (original dose 85 mg/m2, Cycle 3)  Administration: 173 mg (7/7/2021), 173 mg (7/20/2021), 132 mg (8/16/2021), 132 mg (9/8/2021), 132 mg (9/22/2021)         Secondary malignant neoplasm of intrapelvic lymph nodes      6/3/2021 Initial Diagnosis        Secondary malignant neoplasm of intrapelvic lymph nodes         7/7/2021 -  Chemotherapy        Treatment Summary   Plan Name: OP FOLFOX 6 Q2W  Treatment Goal: Curative  Status: Active  Start Date: 7/7/2021  End Date: 11/19/2021 (Planned)  Provider: Nusrat Bar MD  Chemotherapy: fluorouraciL 2,400 mg/m2 = 4,870 mg in sodium chloride 0.9% 101.2 mL chemo infusion, 2,400 mg/m2 = 4,870 mg, Intravenous, Over 46 hours, 5 of 9 cycles  Administration: 4,870 mg (7/7/2021), 4,870 mg (7/20/2021), 4,870 mg (8/16/2021), 4,870 mg (9/8/2021), 4,870 mg (9/22/2021)  leucovorin calcium 400 mg/m2 = 810 mg in dextrose 5 % 290.5 mL infusion, 400 mg/m2 = 810 mg, Intravenous, [facility] 1 time, 5 of 9 cycles  Administration: 810 mg  (7/7/2021), 810 mg (7/20/2021), 810 mg (8/16/2021), 810 mg (9/8/2021), 810 mg (9/22/2021)  oxaliplatin (ELOXATIN) 85 mg/m2 = 173 mg in dextrose 5 % 534.6 mL chemo infusion, 85 mg/m2 = 173 mg, Intravenous, Clinic/HOD 1 time, 5 of 9 cycles  Dose modification: 65 mg/m2 (original dose 85 mg/m2, Cycle 3)  Administration: 173 mg (7/7/2021), 173 mg (7/20/2021), 132 mg (8/16/2021), 132 mg (9/8/2021), 132 mg (9/22/2021)            Interval hx:        Past Medical History:   Diagnosis Date    Cancer     GERD without esophagitis     Hiatal hernia 05/18/2021    normal mucose in second part of the duodenum     Hypercholesterolemia     Hypertension     Hypothyroidism     Left groin hernia     Prostate cancer 1999    Rectal cancer         Past Surgical History:   Procedure Laterality Date    APPENDECTOMY      COLONOSCOPY W/ BIOPSIES  5.18.21 polyp in hepatic flexure,    ESOPHAGOGASTRODUODENOSCOPY  05/18/2021    POLYPECTOMY  05/21/2021    masss in the proximal rectum and mid rectum ( biopsy)    PROSTATECTOMY  1999       Review of patient's allergies indicates:  No Known Allergies    Family History   Problem Relation Age of Onset    Stroke Mother     Cancer Father     Cancer Brother     Clotting disorder Brother     Coronary artery disease Brother        Social History     Socioeconomic History    Marital status:    Tobacco Use    Smoking status: Never Smoker    Smokeless tobacco: Never Used   Substance and Sexual Activity    Alcohol use: Never    Drug use: Never    Sexual activity: Not Currently       ROS:  GENERAL: No fever, chills, fatigability or weight loss.  Integument: No rashes, redness, icterus  CHEST: Denies WALKER, cyanosis, wheezing, cough and sputum production.  CARDIOVASCULAR: Denies chest pain, PND, orthopnea or reduced exercise tolerance.  GI: Denies abd pain, dysphagia, nausea, vomiting, no hematemesis   : Denies burning on urination, no hematuria, no bacteriuria  MSK: No deformities,  swelling, joint pain swelling  Neurologic: No HAs, seizures, weakness, paresthesias, gait problems    PE:  No exam today.  Virtual visit    Assessment:  Locally advance low rectal CA  Complete clinical response to total neoadjuvant therapy  Excellent performance status    Plan:  Discussed MDT recommendations.    Watch and Wait offered as option to definitive surgical resection.  Pt desires organ preserving therapy and prefers no surgery.    The risk of regrowth was quoted at 25% and they understand this could be local or systemic recurrence and requires close follow up.  He will RTC in 3 months.    Repeat MRI in 3 months with repeat flex sigmoidoscopy        The patient location is: home with son Victor M  The chief complaint leading to consultation is: rectal CA follow up.      Visit type: audiovisual    Face to Face time with patient: 15 min  30 minutes of total time spent on the encounter, which includes face to face time and non-face to face time preparing to see the patient (eg, review of tests), Obtaining and/or reviewing separately obtained history, Documenting clinical information in the electronic or other health record, Independently interpreting results (not separately reported) and communicating results to the patient/family/caregiver, or Care coordination (not separately reported).         Each patient to whom he or she provides medical services by telemedicine is:  (1) informed of the relationship between the physician and patient and the respective role of any other health care provider with respect to management of the patient; and (2) notified that he or she may decline to receive medical services by telemedicine and may withdraw from such care at any time.    Notes: see above

## 2022-01-06 DIAGNOSIS — C20 RECTAL CANCER: Primary | ICD-10-CM

## 2022-01-07 ENCOUNTER — TELEPHONE (OUTPATIENT)
Dept: SURGERY | Facility: CLINIC | Age: 87
End: 2022-01-07
Payer: COMMERCIAL

## 2022-01-07 NOTE — TELEPHONE ENCOUNTER
Informed pt's son I scheduled an MRI and Lab for his next visit in March. Copy of appts mailed to pt also.

## 2022-02-22 DIAGNOSIS — D84.9 IMMUNOSUPPRESSED STATUS: ICD-10-CM

## 2022-03-23 ENCOUNTER — OFFICE VISIT (OUTPATIENT)
Dept: SURGERY | Facility: CLINIC | Age: 87
End: 2022-03-23
Payer: COMMERCIAL

## 2022-03-23 ENCOUNTER — HOSPITAL ENCOUNTER (OUTPATIENT)
Dept: RADIOLOGY | Facility: HOSPITAL | Age: 87
Discharge: HOME | End: 2022-03-23
Attending: COLON & RECTAL SURGERY
Payer: COMMERCIAL

## 2022-03-23 VITALS
HEART RATE: 65 BPM | DIASTOLIC BLOOD PRESSURE: 71 MMHG | HEIGHT: 70 IN | WEIGHT: 162.25 LBS | SYSTOLIC BLOOD PRESSURE: 151 MMHG | BODY MASS INDEX: 23.23 KG/M2

## 2022-03-23 DIAGNOSIS — C20 RECTAL CANCER: ICD-10-CM

## 2022-03-23 DIAGNOSIS — C20 RECTAL CANCER: Primary | ICD-10-CM

## 2022-03-23 PROCEDURE — 88305 TISSUE EXAM BY PATHOLOGIST: ICD-10-PCS | Mod: 26,,, | Performed by: STUDENT IN AN ORGANIZED HEALTH CARE EDUCATION/TRAINING PROGRAM

## 2022-03-23 PROCEDURE — 45331 SIGMOIDOSCOPY AND BIOPSY: CPT | Mod: S$GLB,,, | Performed by: COLON & RECTAL SURGERY

## 2022-03-23 PROCEDURE — 3078F DIAST BP <80 MM HG: CPT | Mod: CPTII,S$GLB,, | Performed by: COLON & RECTAL SURGERY

## 2022-03-23 PROCEDURE — 1126F AMNT PAIN NOTED NONE PRSNT: CPT | Mod: CPTII,S$GLB,, | Performed by: COLON & RECTAL SURGERY

## 2022-03-23 PROCEDURE — 3077F SYST BP >= 140 MM HG: CPT | Mod: CPTII,S$GLB,, | Performed by: COLON & RECTAL SURGERY

## 2022-03-23 PROCEDURE — 99214 PR OFFICE/OUTPT VISIT, EST, LEVL IV, 30-39 MIN: ICD-10-PCS | Mod: 25,S$GLB,, | Performed by: COLON & RECTAL SURGERY

## 2022-03-23 PROCEDURE — 99999 PR PBB SHADOW E&M-EST. PATIENT-LVL III: CPT | Mod: PBBFAC,,, | Performed by: COLON & RECTAL SURGERY

## 2022-03-23 PROCEDURE — 88305 TISSUE EXAM BY PATHOLOGIST: CPT | Mod: 26,,, | Performed by: STUDENT IN AN ORGANIZED HEALTH CARE EDUCATION/TRAINING PROGRAM

## 2022-03-23 PROCEDURE — 88305 TISSUE EXAM BY PATHOLOGIST: CPT | Performed by: STUDENT IN AN ORGANIZED HEALTH CARE EDUCATION/TRAINING PROGRAM

## 2022-03-23 PROCEDURE — 72195 MRI RECTAL CANCER WITHOUT CONTRAST: ICD-10-PCS | Mod: 26,,, | Performed by: RADIOLOGY

## 2022-03-23 PROCEDURE — 99214 OFFICE O/P EST MOD 30 MIN: CPT | Mod: 25,S$GLB,, | Performed by: COLON & RECTAL SURGERY

## 2022-03-23 PROCEDURE — 1101F PT FALLS ASSESS-DOCD LE1/YR: CPT | Mod: CPTII,S$GLB,, | Performed by: COLON & RECTAL SURGERY

## 2022-03-23 PROCEDURE — 1126F PR PAIN SEVERITY QUANTIFIED, NO PAIN PRESENT: ICD-10-PCS | Mod: CPTII,S$GLB,, | Performed by: COLON & RECTAL SURGERY

## 2022-03-23 PROCEDURE — 3288F FALL RISK ASSESSMENT DOCD: CPT | Mod: CPTII,S$GLB,, | Performed by: COLON & RECTAL SURGERY

## 2022-03-23 PROCEDURE — 72195 MRI PELVIS W/O DYE: CPT | Mod: 26,,, | Performed by: RADIOLOGY

## 2022-03-23 PROCEDURE — 99999 PR PBB SHADOW E&M-EST. PATIENT-LVL III: ICD-10-PCS | Mod: PBBFAC,,, | Performed by: COLON & RECTAL SURGERY

## 2022-03-23 PROCEDURE — 72195 MRI PELVIS W/O DYE: CPT | Mod: TC

## 2022-03-23 PROCEDURE — 3077F PR MOST RECENT SYSTOLIC BLOOD PRESSURE >= 140 MM HG: ICD-10-PCS | Mod: CPTII,S$GLB,, | Performed by: COLON & RECTAL SURGERY

## 2022-03-23 PROCEDURE — 3288F PR FALLS RISK ASSESSMENT DOCUMENTED: ICD-10-PCS | Mod: CPTII,S$GLB,, | Performed by: COLON & RECTAL SURGERY

## 2022-03-23 PROCEDURE — 45331 PR SIGMOIDOSCOPY,BIOPSY: ICD-10-PCS | Mod: S$GLB,,, | Performed by: COLON & RECTAL SURGERY

## 2022-03-23 PROCEDURE — 3078F PR MOST RECENT DIASTOLIC BLOOD PRESSURE < 80 MM HG: ICD-10-PCS | Mod: CPTII,S$GLB,, | Performed by: COLON & RECTAL SURGERY

## 2022-03-23 PROCEDURE — 1101F PR PT FALLS ASSESS DOC 0-1 FALLS W/OUT INJ PAST YR: ICD-10-PCS | Mod: CPTII,S$GLB,, | Performed by: COLON & RECTAL SURGERY

## 2022-03-23 NOTE — PROGRESS NOTES
HPI:  88 yo male with locally advanced low rectal cancer.      Status post HAYDEE    Excellent clinical response                                   Oncology History        Rectal cancer        5/28/2021 Initial Diagnosis          Rectal cancer           6/1/2021 Cancer Staged          Staging form: Colon and Rectum, AJCC 8th Edition  - Clinical stage from 6/1/2021: Stage IIIB (cT3, cN1b, cM0)           6/15/2021 Notable Event          He was diagnosed after noting increasing stool frequency with fecal urgency as well as BRBPR. He underwent colonoscopy on 5/18/21 which noted a single sessile 6 mm polyp in the hepatic flexure, and ulcerated mass in the proximal rectum and mid rectum between 7-10 cm from the anus with biopsy demonstrating invasive moderately differentiated adenocarcinoma, MMR proficient.  MRI rectum with without contrast done on 06/01/2021 showed low/mid rectal tumor with less than 50% circumferential involvement and extension through the muscularis propria into the mesorectal fat, up to 2 abnormal mesorectal fat lymph nodes.  Patient was clinically diagnosed with cT3 cN1b Mx.  CT chest/abdomen/pelvis with contrast done on 06/01/2021 has shown subcentimeter pulmonary nodules [need f-up], no pathologically enlarged adenopathy within the chest, liver was normal, prominent mesenteric lymph nodes.  Evaluated by Dr. Dominick Boland on 06/03/2021 and noted recommendations for total neoadjuvant therapy with short course RT followed by 9 cycles of FOLFOX [given patient's age noted recommendation to drop bolus 5 FU].  Will need systemic imaging after 4 cycles of chemo.  And CT chest/abdomen and MRI rectum protocol 1 month after completion of FOLFOX.           6/15/2021 - 6/21/2021 Radiation Therapy          Treating physician: Michael Hernandez MD  Total Dose: 25 Gy  Fractions: 5     Treatment Summary  Course: C1 Pelvis 2021     Treatment Site Ref. ID Energy Dose/Fx (Gy) #Fx Dose Correction (Gy) Total Dose (Gy) Start Date  End Date Elapsed Days   IM PELVIS PTV fior 6X 5 5 / 5 0 25 6/15/2021 6/21/2021 6          7/7/2021 -  Chemotherapy          Treatment Summary   Plan Name: OP FOLFOX 6 Q2W  Treatment Goal: Curative  Status: Active  Start Date: 7/7/2021  End Date: 11/19/2021 (Planned)  Provider: Nusrat Bar MD  Chemotherapy: fluorouraciL 2,400 mg/m2 = 4,870 mg in sodium chloride 0.9% 101.2 mL chemo infusion, 2,400 mg/m2 = 4,870 mg, Intravenous, Over 46 hours, 5 of 9 cycles  Administration: 4,870 mg (7/7/2021), 4,870 mg (7/20/2021), 4,870 mg (8/16/2021), 4,870 mg (9/8/2021), 4,870 mg (9/22/2021)  leucovorin calcium 400 mg/m2 = 810 mg in dextrose 5 % 290.5 mL infusion, 400 mg/m2 = 810 mg, Intravenous, Clinic/HOD 1 time, 5 of 9 cycles  Administration: 810 mg (7/7/2021), 810 mg (7/20/2021), 810 mg (8/16/2021), 810 mg (9/8/2021), 810 mg (9/22/2021)  oxaliplatin (ELOXATIN) 85 mg/m2 = 173 mg in dextrose 5 % 534.6 mL chemo infusion, 85 mg/m2 = 173 mg, Intravenous, Clinic/HOD 1 time, 5 of 9 cycles  Dose modification: 65 mg/m2 (original dose 85 mg/m2, Cycle 3)  Administration: 173 mg (7/7/2021), 173 mg (7/20/2021), 132 mg (8/16/2021), 132 mg (9/8/2021), 132 mg (9/22/2021)           Secondary malignant neoplasm of intrapelvic lymph nodes        6/3/2021 Initial Diagnosis          Secondary malignant neoplasm of intrapelvic lymph nodes           7/7/2021 -  Chemotherapy          Treatment Summary   Plan Name: OP FOLFOX 6 Q2W  Treatment Goal: Curative  Status: Active  Start Date: 7/7/2021  End Date: 11/19/2021 (Planned)  Provider: Nusrat Bar MD  Chemotherapy: fluorouraciL 2,400 mg/m2 = 4,870 mg in sodium chloride 0.9% 101.2 mL chemo infusion, 2,400 mg/m2 = 4,870 mg, Intravenous, Over 46 hours, 5 of 9 cycles  Administration: 4,870 mg (7/7/2021), 4,870 mg (7/20/2021), 4,870 mg (8/16/2021), 4,870 mg (9/8/2021), 4,870 mg (9/22/2021)  leucovorin calcium 400 mg/m2 = 810 mg in dextrose 5 % 290.5 mL infusion, 400 mg/m2 = 810 mg, Intravenous,  Clinic/HOD 1 time, 5 of 9 cycles  Administration: 810 mg (7/7/2021), 810 mg (7/20/2021), 810 mg (8/16/2021), 810 mg (9/8/2021), 810 mg (9/22/2021)  oxaliplatin (ELOXATIN) 85 mg/m2 = 173 mg in dextrose 5 % 534.6 mL chemo infusion, 85 mg/m2 = 173 mg, Intravenous, Clinic/HOD 1 time, 5 of 9 cycles  Dose modification: 65 mg/m2 (original dose 85 mg/m2, Cycle 3)  Administration: 173 mg (7/7/2021), 173 mg (7/20/2021), 132 mg (8/16/2021), 132 mg (9/8/2021), 132 mg (9/22/2021)                Interval hx:   Pt elected watch and wait.  He feels well.  No pain, bleeding or change in bowels.  Appetite and energy levels good.      EXAMINATION:  MRI RECTAL CANCER WITHOUT CONTRAST     CLINICAL HISTORY:  Malignant neoplasm of rectum     TECHNIQUE:  Multiplanar multisequence imaging of the pelvis per rectal cancer restaging protocol.     COMPARISON:  MRI rectal cancer protocol from 12/21/2021, 09/21/2021, and 06/01/2021.     Pretreatment Tumor Staging: [id number]     Prior Treatment: Chemo and radiation therapy     FINDINGS:  Persistent T2 hypointense scar at treatment site in the left anterolateral aspect of the rectum.  No discrete measurable lesion or abnormal diffusion restriction.     TREATED TUMOR/TUMOR BED CHARACTERISTICS:     *DWI - restricted diffusion (with associated low ADC) in tumor or tumor bed: No discrete diffusion restricting lesion.  Slight heterogeneous diffusion restriction about treatment site, favored to represent artifact.     *T2: The primary tumor and extramural disease shows Entirely dark T2 signal/scar     Distance of inferior margin of treated tumor/treated area to the anal verge: 5.5 cm     Distance of inferior margin of treated tumor/treated area to the top of the sphincter complex/anorectal junction: 2.5 cm     Relationship to anterior peritoneal reflection: Below     Craniocaudal length: 2.6 cm     Previous craniocaudal length: 2.5 cm     Maximal wall thickness: 7 mm     Previous wall thickness: 6  mm     LOW RECTAL TUMORS: Invasion of anal sphincter complex: Absent     *EMVI:  No (none evident pre-treatment)     *LYMPH NODES     Mesorectal/superior rectal lymph nodes and/or tumor deposits: Persistent enlarged 6 mm left mesorectal lymph node with somewhat rounded and spiculated morphology (series 10, image 37).  Previously measured 6 mm.  No new suspicious lymph nodes.     Extra mesorectal lymph nodes: No     MISCELLANEOUS: Trace free fluid in the pelvis.  Extensive sigmoid diverticulosis.  Prostate is surgically absent.  Stable T1 hypointense focus in the right iliac bone, possibly represents bone island.     Impression:     *TUMOR     Compared toMRI rectal cancer protocol from 12/21/2021, post treatment primary tumor assessment: Possible complete/near complete response     Good Response-dense fibrosis>75%.  No discrete measurable lesion.  No abnormal diffusion restriction to suggest local recurrence/residual disease.     *NODES     Suspicious Mesorectal Lymph nodes: Yes (persistently enlarged left mesorectal lymph node as above)     Suspicious Extramesorectal Lymph nodes: No     Electronically signed by resident: Luis Fernando Palacio MD  Date:                                            03/23/2022  Time:                                           11:14     Electronically signed by: Luis Fernando Palacio MD  Date:                                            03/23/2022  Time:                                           13:25      Interval hx:    Feels well.  No pain.  BMs good no bleeding.    Appetite and energy levels good    Past Medical History:   Diagnosis Date    Cancer     GERD without esophagitis     Hiatal hernia 05/18/2021    normal mucose in second part of the duodenum     Hypercholesterolemia     Hypertension     Hypothyroidism     Left groin hernia     Prostate cancer 1999    Rectal cancer         Past Surgical History:   Procedure Laterality Date    APPENDECTOMY      COLONOSCOPY W/ BIOPSIES  5.18.21  "polyp in hepatic flexure,    ESOPHAGOGASTRODUODENOSCOPY  05/18/2021    POLYPECTOMY  05/21/2021    masss in the proximal rectum and mid rectum ( biopsy)    PROSTATECTOMY  1999       Review of patient's allergies indicates:  No Known Allergies    Family History   Problem Relation Age of Onset    Stroke Mother     Cancer Father     Cancer Brother     Clotting disorder Brother     Coronary artery disease Brother        Social History     Socioeconomic History    Marital status:    Tobacco Use    Smoking status: Never Smoker    Smokeless tobacco: Never Used   Substance and Sexual Activity    Alcohol use: Never    Drug use: Never    Sexual activity: Not Currently       ROS:  GENERAL: No fever, chills, fatigability or weight loss.  Integument: No rashes, redness, icterus  CHEST: Denies WALKER, cyanosis, wheezing, cough and sputum production.  CARDIOVASCULAR: Denies chest pain, PND, orthopnea or reduced exercise tolerance.  GI: Denies abd pain, dysphagia, nausea, vomiting, no hematemesis   : Denies burning on urination, no hematuria, no bacteriuria  MSK: No deformities, swelling, joint pain swelling  Neurologic: No HAs, seizures, weakness, paresthesias, gait problems    PE:  General appearance well  BP (!) 151/71 (BP Location: Left arm, Patient Position: Sitting, BP Method: Large (Automatic))   Pulse 65   Ht 5' 10" (1.778 m)   Wt 73.6 kg (162 lb 4.1 oz)   BMI 23.28 kg/m²   Sclera/ Skin anicteric  LN none palpable  AT NC EOMI  Neck supple trachea midline   Chest symmetric, nl excursion, no retractions, breathing comfortably  Abdomen  ND soft NT.  no masses, no organomegaly  EXT - no CCE  Neuro:  Mood/ affect nl, alert and oriented x 3, moves all ext's, gait nl    Rectal  Inspection nl  JANKI good tone, mass firm, ulcerated, left anterior - 6 cm from anal verge      Assessment:  Possible recurrence of rectal CA  Good performance status    Plan:  Review pathology  MDT  OV 2 weeks      "

## 2022-04-01 LAB
FINAL PATHOLOGIC DIAGNOSIS: NORMAL
GROSS: NORMAL
Lab: NORMAL
MICROSCOPIC EXAM: NORMAL

## 2022-04-06 ENCOUNTER — DOCUMENTATION ONLY (OUTPATIENT)
Dept: SURGERY | Facility: CLINIC | Age: 87
End: 2022-04-06
Payer: COMMERCIAL

## 2022-04-06 ENCOUNTER — OFFICE VISIT (OUTPATIENT)
Dept: SURGERY | Facility: CLINIC | Age: 87
End: 2022-04-06
Payer: COMMERCIAL

## 2022-04-06 VITALS
HEART RATE: 54 BPM | HEIGHT: 70 IN | BODY MASS INDEX: 22.57 KG/M2 | DIASTOLIC BLOOD PRESSURE: 66 MMHG | WEIGHT: 157.63 LBS | SYSTOLIC BLOOD PRESSURE: 149 MMHG

## 2022-04-06 DIAGNOSIS — C20 RECTAL CANCER: Primary | ICD-10-CM

## 2022-04-06 PROCEDURE — 3078F DIAST BP <80 MM HG: CPT | Mod: CPTII,S$GLB,, | Performed by: COLON & RECTAL SURGERY

## 2022-04-06 PROCEDURE — 3078F PR MOST RECENT DIASTOLIC BLOOD PRESSURE < 80 MM HG: ICD-10-PCS | Mod: CPTII,S$GLB,, | Performed by: COLON & RECTAL SURGERY

## 2022-04-06 PROCEDURE — 1126F PR PAIN SEVERITY QUANTIFIED, NO PAIN PRESENT: ICD-10-PCS | Mod: CPTII,S$GLB,, | Performed by: COLON & RECTAL SURGERY

## 2022-04-06 PROCEDURE — 3288F FALL RISK ASSESSMENT DOCD: CPT | Mod: CPTII,S$GLB,, | Performed by: COLON & RECTAL SURGERY

## 2022-04-06 PROCEDURE — 1159F PR MEDICATION LIST DOCUMENTED IN MEDICAL RECORD: ICD-10-PCS | Mod: CPTII,S$GLB,, | Performed by: COLON & RECTAL SURGERY

## 2022-04-06 PROCEDURE — 1101F PT FALLS ASSESS-DOCD LE1/YR: CPT | Mod: CPTII,S$GLB,, | Performed by: COLON & RECTAL SURGERY

## 2022-04-06 PROCEDURE — 3288F PR FALLS RISK ASSESSMENT DOCUMENTED: ICD-10-PCS | Mod: CPTII,S$GLB,, | Performed by: COLON & RECTAL SURGERY

## 2022-04-06 PROCEDURE — 99214 PR OFFICE/OUTPT VISIT, EST, LEVL IV, 30-39 MIN: ICD-10-PCS | Mod: S$GLB,,, | Performed by: COLON & RECTAL SURGERY

## 2022-04-06 PROCEDURE — 1101F PR PT FALLS ASSESS DOC 0-1 FALLS W/OUT INJ PAST YR: ICD-10-PCS | Mod: CPTII,S$GLB,, | Performed by: COLON & RECTAL SURGERY

## 2022-04-06 PROCEDURE — 99214 OFFICE O/P EST MOD 30 MIN: CPT | Mod: S$GLB,,, | Performed by: COLON & RECTAL SURGERY

## 2022-04-06 PROCEDURE — 1126F AMNT PAIN NOTED NONE PRSNT: CPT | Mod: CPTII,S$GLB,, | Performed by: COLON & RECTAL SURGERY

## 2022-04-06 PROCEDURE — 99999 PR PBB SHADOW E&M-EST. PATIENT-LVL III: CPT | Mod: PBBFAC,,, | Performed by: COLON & RECTAL SURGERY

## 2022-04-06 PROCEDURE — 3077F SYST BP >= 140 MM HG: CPT | Mod: CPTII,S$GLB,, | Performed by: COLON & RECTAL SURGERY

## 2022-04-06 PROCEDURE — 3077F PR MOST RECENT SYSTOLIC BLOOD PRESSURE >= 140 MM HG: ICD-10-PCS | Mod: CPTII,S$GLB,, | Performed by: COLON & RECTAL SURGERY

## 2022-04-06 PROCEDURE — 99999 PR PBB SHADOW E&M-EST. PATIENT-LVL III: ICD-10-PCS | Mod: PBBFAC,,, | Performed by: COLON & RECTAL SURGERY

## 2022-04-06 PROCEDURE — 1159F MED LIST DOCD IN RCRD: CPT | Mod: CPTII,S$GLB,, | Performed by: COLON & RECTAL SURGERY

## 2022-04-06 NOTE — PROVATION PATIENT INSTRUCTIONS
Discharge Summary/Instructions after an Endoscopic Procedure  Patient Name: Prosper Soliz  Patient MRN: [id number]  Patient YOB: 1935 Wednesday, March 23, 2022  Robert Jensen MD  Dear patient,  As a result of recent federal legislation (The Federal Cures Act), you may   receive lab or pathology results from your procedure in your [facility]   account before your physician is able to contact you. Your physician or   their representative will relay the results to you with their   recommendations at their soonest availability.  Thank you,  RESTRICTIONS:  During your procedure today, you received medications for sedation.  These   medications may affect your judgment, balance and coordination.  Therefore,   for 24 hours, you have the following restrictions:   - DO NOT drive a car, operate machinery, make legal/financial decisions,   sign important papers or drink alcohol.    ACTIVITY:  Today: no heavy lifting, straining or running due to procedural   sedation/anesthesia.  The following day: return to full activity including work.  DIET:  Eat and drink normally unless instructed otherwise.     TREATMENT FOR COMMON SIDE EFFECTS:  - Mild abdominal pain, nausea, belching, bloating or excessive gas:  rest,   eat lightly and use a heating pad.  - Sore Throat: treat with throat lozenges and/or gargle with warm salt   water.  - Because air was used during the procedure, expelling large amounts of air   from your rectum or belching is normal.  - If a bowel prep was taken, you may not have a bowel movement for 1-3 days.    This is normal.  SYMPTOMS TO WATCH FOR AND REPORT TO YOUR PHYSICIAN:  1. Abdominal pain or bloating, other than gas cramps.  2. Chest pain.  3. Back pain.  4. Signs of infection such as: chills or fever occurring within 24 hours   after the procedure.  5. Rectal bleeding, which would show as bright red, maroon, or black stools.   (A tablespoon of blood from the rectum is not serious, especially if    hemorrhoids are present.)  6. Vomiting.  7. Weakness or dizziness.  GO DIRECTLY TO THE NEAREST EMERGENCY ROOM IF YOU HAVE ANY OF THE FOLLOWING:      Difficulty breathing              Chills and/or fever over 101 F   Persistent vomiting and/or vomiting blood   Severe abdominal pain   Severe chest pain   Black, tarry stools   Bleeding- more than one tablespoon   Any other symptom or condition that you feel may need urgent attention  Your doctor recommends these additional instructions:  If any biopsies were taken, your doctors clinic will contact you in 1 to 2   weeks with any results.  - Continue present medications.   - Discharge patient to home (ambulatory).   - Patient has a contact number available for emergencies.  The signs and   symptoms of potential delayed complications were discussed with the   patient.  Return to normal activities tomorrow.  Written discharge   instructions were provided to the patient.   - Return to my office in 2 weeks.  For questions, problems or results please call your physician - Robert Jensen MD at Work:  [phone].  [facility] [place], EMERGENCY ROOM PHONE NUMBER: [phone]  IF A COMPLICATION OR EMERGENCY SITUATION ARISES AND YOU ARE UNABLE TO REACH   YOUR PHYSICIAN - GO DIRECTLY TO THE EMERGENCY ROOM.  Robert Jensen MD  4/6/2022 7:51:57 AM  This report has been verified and signed electronically.  Dear patient,  As a result of recent federal legislation (The Federal Cures Act), you may   receive lab or pathology results from your procedure in your [facility]   account before your physician is able to contact you. Your physician or   their representative will relay the results to you with their   recommendations at their soonest availability.  Thank you,  PROVATION

## 2022-04-06 NOTE — PROGRESS NOTES
HPI:  86 yo male with locally advanced low rectal cancer.       Status post HAYDEE     Excellent clinical response                                                    Oncology History         Rectal cancer         5/28/2021 Initial Diagnosis           Rectal cancer            6/1/2021 Cancer Staged           Staging form: Colon and Rectum, AJCC 8th Edition  - Clinical stage from 6/1/2021: Stage IIIB (cT3, cN1b, cM0)            6/15/2021 Notable Event           He was diagnosed after noting increasing stool frequency with fecal urgency as well as BRBPR. He underwent colonoscopy on 5/18/21 which noted a single sessile 6 mm polyp in the hepatic flexure, and ulcerated mass in the proximal rectum and mid rectum between 7-10 cm from the anus with biopsy demonstrating invasive moderately differentiated adenocarcinoma, MMR proficient.  MRI rectum with without contrast done on 06/01/2021 showed low/mid rectal tumor with less than 50% circumferential involvement and extension through the muscularis propria into the mesorectal fat, up to 2 abnormal mesorectal fat lymph nodes.  Patient was clinically diagnosed with cT3 cN1b Mx.  CT chest/abdomen/pelvis with contrast done on 06/01/2021 has shown subcentimeter pulmonary nodules [need f-up], no pathologically enlarged adenopathy within the chest, liver was normal, prominent mesenteric lymph nodes.  Evaluated by Dr. Dominick Boland on 06/03/2021 and noted recommendations for total neoadjuvant therapy with short course RT followed by 9 cycles of FOLFOX [given patient's age noted recommendation to drop bolus 5 FU].  Will need systemic imaging after 4 cycles of chemo.  And CT chest/abdomen and MRI rectum protocol 1 month after completion of FOLFOX.            6/15/2021 - 6/21/2021 Radiation Therapy           Treating physician: Michael Hernandez MD  Total Dose: 25 Gy  Fractions: 5     Treatment Summary  Course: C1 Pelvis 2021     Treatment Site Ref. ID Energy Dose/Fx (Gy) #Fx Dose Correction (Gy)  Total Dose (Gy) Start Date End Date Elapsed Days   IM PELVIS PTV fior 6X 5 5 / 5 0 25 6/15/2021 6/21/2021 6           7/7/2021 -  Chemotherapy           Treatment Summary   Plan Name: OP FOLFOX 6 Q2W  Treatment Goal: Curative  Status: Active  Start Date: 7/7/2021  End Date: 11/19/2021 (Planned)  Provider: Nusrat Bar MD  Chemotherapy: fluorouraciL 2,400 mg/m2 = 4,870 mg in sodium chloride 0.9% 101.2 mL chemo infusion, 2,400 mg/m2 = 4,870 mg, Intravenous, Over 46 hours, 5 of 9 cycles  Administration: 4,870 mg (7/7/2021), 4,870 mg (7/20/2021), 4,870 mg (8/16/2021), 4,870 mg (9/8/2021), 4,870 mg (9/22/2021)  leucovorin calcium 400 mg/m2 = 810 mg in dextrose 5 % 290.5 mL infusion, 400 mg/m2 = 810 mg, Intravenous, Clinic/HOD 1 time, 5 of 9 cycles  Administration: 810 mg (7/7/2021), 810 mg (7/20/2021), 810 mg (8/16/2021), 810 mg (9/8/2021), 810 mg (9/22/2021)  oxaliplatin (ELOXATIN) 85 mg/m2 = 173 mg in dextrose 5 % 534.6 mL chemo infusion, 85 mg/m2 = 173 mg, Intravenous, Clinic/HOD 1 time, 5 of 9 cycles  Dose modification: 65 mg/m2 (original dose 85 mg/m2, Cycle 3)  Administration: 173 mg (7/7/2021), 173 mg (7/20/2021), 132 mg (8/16/2021), 132 mg (9/8/2021), 132 mg (9/22/2021)            Secondary malignant neoplasm of intrapelvic lymph nodes         6/3/2021 Initial Diagnosis           Secondary malignant neoplasm of intrapelvic lymph nodes            7/7/2021 -  Chemotherapy           Treatment Summary   Plan Name: OP FOLFOX 6 Q2W  Treatment Goal: Curative  Status: Active  Start Date: 7/7/2021  End Date: 11/19/2021 (Planned)  Provider: Nusrat Bar MD  Chemotherapy: fluorouraciL 2,400 mg/m2 = 4,870 mg in sodium chloride 0.9% 101.2 mL chemo infusion, 2,400 mg/m2 = 4,870 mg, Intravenous, Over 46 hours, 5 of 9 cycles  Administration: 4,870 mg (7/7/2021), 4,870 mg (7/20/2021), 4,870 mg (8/16/2021), 4,870 mg (9/8/2021), 4,870 mg (9/22/2021)  leucovorin calcium 400 mg/m2 = 810 mg in dextrose 5 % 290.5 mL infusion, 400  mg/m2 = 810 mg, Intravenous, Clinic/HOD 1 time, 5 of 9 cycles  Administration: 810 mg (7/7/2021), 810 mg (7/20/2021), 810 mg (8/16/2021), 810 mg (9/8/2021), 810 mg (9/22/2021)  oxaliplatin (ELOXATIN) 85 mg/m2 = 173 mg in dextrose 5 % 534.6 mL chemo infusion, 85 mg/m2 = 173 mg, Intravenous, Clinic/HOD 1 time, 5 of 9 cycles  Dose modification: 65 mg/m2 (original dose 85 mg/m2, Cycle 3)  Administration: 173 mg (7/7/2021), 173 mg (7/20/2021), 132 mg (8/16/2021), 132 mg (9/8/2021), 132 mg (9/22/2021)                  Interval hx:   Pt elected watch and wait.  He feels well.  No pain, bleeding or change in bowels.  Appetite and energy levels good.       EXAMINATION:  MRI RECTAL CANCER WITHOUT CONTRAST     CLINICAL HISTORY:  Malignant neoplasm of rectum     TECHNIQUE:  Multiplanar multisequence imaging of the pelvis per rectal cancer restaging protocol.     COMPARISON:  MRI rectal cancer protocol from 12/21/2021, 09/21/2021, and 06/01/2021.     Pretreatment Tumor Staging: [id number]     Prior Treatment: Chemo and radiation therapy     FINDINGS:  Persistent T2 hypointense scar at treatment site in the left anterolateral aspect of the rectum.  No discrete measurable lesion or abnormal diffusion restriction.     TREATED TUMOR/TUMOR BED CHARACTERISTICS:     *DWI - restricted diffusion (with associated low ADC) in tumor or tumor bed: No discrete diffusion restricting lesion.  Slight heterogeneous diffusion restriction about treatment site, favored to represent artifact.     *T2: The primary tumor and extramural disease shows Entirely dark T2 signal/scar     Distance of inferior margin of treated tumor/treated area to the anal verge: 5.5 cm     Distance of inferior margin of treated tumor/treated area to the top of the sphincter complex/anorectal junction: 2.5 cm     Relationship to anterior peritoneal reflection: Below     Craniocaudal length: 2.6 cm     Previous craniocaudal length: 2.5 cm     Maximal wall thickness: 7  mm     Previous wall thickness: 6 mm     LOW RECTAL TUMORS: Invasion of anal sphincter complex: Absent     *EMVI:  No (none evident pre-treatment)     *LYMPH NODES     Mesorectal/superior rectal lymph nodes and/or tumor deposits: Persistent enlarged 6 mm left mesorectal lymph node with somewhat rounded and spiculated morphology (series 10, image 37).  Previously measured 6 mm.  No new suspicious lymph nodes.     Extra mesorectal lymph nodes: No     MISCELLANEOUS: Trace free fluid in the pelvis.  Extensive sigmoid diverticulosis.  Prostate is surgically absent.  Stable T1 hypointense focus in the right iliac bone, possibly represents bone island.     Impression:     *TUMOR     Compared toMRI rectal cancer protocol from 12/21/2021, post treatment primary tumor assessment: Possible complete/near complete response     Good Response-dense fibrosis>75%.  No discrete measurable lesion.  No abnormal diffusion restriction to suggest local recurrence/residual disease.     *NODES     Suspicious Mesorectal Lymph nodes: Yes (persistently enlarged left mesorectal lymph node as above)     Suspicious Extramesorectal Lymph nodes: No     Electronically signed by resident: Luis Fernando Palacio MD  Date:                                            03/23/2022  Time:                                           11:14     Electronically signed by: Luis Fernando Palacio MD  Date:                                            03/23/2022  Time:                                           13:25     4-  Interval hx:    Pt seen with son.  He feels well.  No bleeding since biopsies.        Past Medical History:   Diagnosis Date    Cancer     GERD without esophagitis     Hiatal hernia 05/18/2021    normal mucose in second part of the duodenum     Hypercholesterolemia     Hypertension     Hypothyroidism     Left groin hernia     Prostate cancer 1999    Rectal cancer         Past Surgical History:   Procedure Laterality Date    APPENDECTOMY       "COLONOSCOPY W/ BIOPSIES  5.18.21 polyp in hepatic flexure,    ESOPHAGOGASTRODUODENOSCOPY  05/18/2021    POLYPECTOMY  05/21/2021    masss in the proximal rectum and mid rectum ( biopsy)    PROSTATECTOMY  1999       Review of patient's allergies indicates:  No Known Allergies    Family History   Problem Relation Age of Onset    Stroke Mother     Cancer Father     Cancer Brother     Clotting disorder Brother     Coronary artery disease Brother        Social History     Socioeconomic History    Marital status:    Tobacco Use    Smoking status: Never Smoker    Smokeless tobacco: Never Used   Substance and Sexual Activity    Alcohol use: Never    Drug use: Never    Sexual activity: Not Currently       ROS:  GENERAL: No fever, chills, fatigability or weight loss.  Integument: No rashes, redness, icterus  CHEST: Denies WALKER, cyanosis, wheezing, cough and sputum production.  CARDIOVASCULAR: Denies chest pain, PND, orthopnea or reduced exercise tolerance.  GI: Denies abd pain, dysphagia, nausea, vomiting, no hematemesis   : Denies burning on urination, no hematuria, no bacteriuria  MSK: No deformities, swelling, joint pain swelling  Neurologic: No HAs, seizures, weakness, paresthesias, gait problems    PE:  General appearance well  BP (!) 149/66 (BP Location: Left arm, Patient Position: Sitting, BP Method: Medium (Automatic))   Pulse (!) 54   Ht 5' 10" (1.778 m)   Wt 71.5 kg (157 lb 10.1 oz)   BMI 22.62 kg/m²   Sclera/ Skin anicteric  LN none palpable  AT NC EOMI  Neck supple trachea midline   Chest symmetric, nl excursion, no retractions, breathing comfortably  EXT - no CCE  Neuro:  Mood/ affect nl, alert and oriented x 3, moves all ext's, gait nl      Assessment:  Rectal CA with clinical response  Biopsies of area of firmness negative for neoplasia    Plan:  I discussed MDT recommendations.  I offered him EUA with deeper biopsies.    Pt wants 3 month follow up given the negative biopsies.      "

## 2022-04-06 NOTE — PROGRESS NOTES
[facility]  Colon, Rectal and Anal Malignancies  Multi-disciplinary Tumor Board     [address]  [place], [place]  Tel: [phone]  Fax: [phone]  [email or web address]     Patient name: Prosper Soliz   YOB: 1935   MRN: [id number]    Date of discussion: 04/06/2022    Thank you for referring Mr. Soliz to our care here at [facility]. Please allow us to summarize our review of records and recommendations.    The following disciplines were present for the discussion:    Colon and Rectal Surgery   Medical Oncology   Radiation Oncology   Pathology   Radiology    Endoscopy reviewed:    Date performed: 12/15/2021   Yes, incomplete evaluation of colon and rectum performed    CT Chest, Abdomen and Pelvis    Date performed: 12/21/2021    Performed at our facility: Yes   Metastatic disease present: No    MRI Rectal Cancer Protocol   Date performed: 3/23/2022    Performed at our facility: Yes   Tumor location in the rectum: Lower (0-5 cm)   Sphincter involvement: Absent   Pretreatment circumferential resection margin status: Threatened (<2 mm)    Pathology reviewed:    Date pathology finalized: 4/1/2022   Yes, report only     Pre-treatment CEA:   Date performed: 5/28/2021   CEA Level: 3.5    Pre-treatment Clinical Stage:   T3 - Tumor invades the muscularis propria   N+ - Desirae disease is suspected   M0 - No metastasis suspected    Based on our review of the current information we have made the following recommendations:  Summary: 87M c T3N1M0 rectal cancer s/p HAYDEE with complete response now on watch and wait protocol. Patient seen for surveillance with repeat flexible sigmoidoscopy notable for ulcerative firm mass, biopsy only demonstrated benign findings. Plan for EUA to further assess for potential recurrence.     Additional laboratory, imaging, or endoscopic studies   none    Therapies   Exam under anethesia    Clinical research study  eligibility - No    Referrals   None    Please do not hesitate to contact us for any questions.

## 2022-08-31 ENCOUNTER — LAB VISIT (OUTPATIENT)
Dept: LAB | Facility: HOSPITAL | Age: 87
End: 2022-08-31
Attending: STUDENT IN AN ORGANIZED HEALTH CARE EDUCATION/TRAINING PROGRAM
Payer: COMMERCIAL

## 2022-08-31 ENCOUNTER — OFFICE VISIT (OUTPATIENT)
Dept: SURGERY | Facility: CLINIC | Age: 87
End: 2022-08-31
Payer: COMMERCIAL

## 2022-08-31 VITALS
BODY MASS INDEX: 23.5 KG/M2 | HEIGHT: 70 IN | SYSTOLIC BLOOD PRESSURE: 170 MMHG | WEIGHT: 164.13 LBS | HEART RATE: 83 BPM | DIASTOLIC BLOOD PRESSURE: 73 MMHG

## 2022-08-31 DIAGNOSIS — C20 RECTAL CANCER: ICD-10-CM

## 2022-08-31 DIAGNOSIS — C20 RECTAL CANCER: Primary | ICD-10-CM

## 2022-08-31 LAB — CEA SERPL-MCNC: 2 NG/ML (ref 0–5)

## 2022-08-31 PROCEDURE — 99214 OFFICE O/P EST MOD 30 MIN: CPT | Mod: 25,S$GLB,, | Performed by: COLON & RECTAL SURGERY

## 2022-08-31 PROCEDURE — 3078F PR MOST RECENT DIASTOLIC BLOOD PRESSURE < 80 MM HG: ICD-10-PCS | Mod: CPTII,S$GLB,, | Performed by: COLON & RECTAL SURGERY

## 2022-08-31 PROCEDURE — 99214 PR OFFICE/OUTPT VISIT, EST, LEVL IV, 30-39 MIN: ICD-10-PCS | Mod: 25,S$GLB,, | Performed by: COLON & RECTAL SURGERY

## 2022-08-31 PROCEDURE — 99999 PR PBB SHADOW E&M-EST. PATIENT-LVL III: CPT | Mod: PBBFAC,,, | Performed by: COLON & RECTAL SURGERY

## 2022-08-31 PROCEDURE — 45330 PR SIGMOIDOSCOPY,DIAG2STIC: ICD-10-PCS | Mod: S$GLB,,, | Performed by: COLON & RECTAL SURGERY

## 2022-08-31 PROCEDURE — 1101F PT FALLS ASSESS-DOCD LE1/YR: CPT | Mod: CPTII,S$GLB,, | Performed by: COLON & RECTAL SURGERY

## 2022-08-31 PROCEDURE — 45330 DIAGNOSTIC SIGMOIDOSCOPY: CPT | Mod: S$GLB,,, | Performed by: COLON & RECTAL SURGERY

## 2022-08-31 PROCEDURE — 1101F PR PT FALLS ASSESS DOC 0-1 FALLS W/OUT INJ PAST YR: ICD-10-PCS | Mod: CPTII,S$GLB,, | Performed by: COLON & RECTAL SURGERY

## 2022-08-31 PROCEDURE — 99999 PR PBB SHADOW E&M-EST. PATIENT-LVL III: ICD-10-PCS | Mod: PBBFAC,,, | Performed by: COLON & RECTAL SURGERY

## 2022-08-31 PROCEDURE — 3288F FALL RISK ASSESSMENT DOCD: CPT | Mod: CPTII,S$GLB,, | Performed by: COLON & RECTAL SURGERY

## 2022-08-31 PROCEDURE — 1126F PR PAIN SEVERITY QUANTIFIED, NO PAIN PRESENT: ICD-10-PCS | Mod: CPTII,S$GLB,, | Performed by: COLON & RECTAL SURGERY

## 2022-08-31 PROCEDURE — 88341 IMHCHEM/IMCYTCHM EA ADD ANTB: CPT | Performed by: PATHOLOGY

## 2022-08-31 PROCEDURE — 82378 CARCINOEMBRYONIC ANTIGEN: CPT | Performed by: STUDENT IN AN ORGANIZED HEALTH CARE EDUCATION/TRAINING PROGRAM

## 2022-08-31 PROCEDURE — 88342 IMHCHEM/IMCYTCHM 1ST ANTB: CPT | Performed by: PATHOLOGY

## 2022-08-31 PROCEDURE — 3288F PR FALLS RISK ASSESSMENT DOCUMENTED: ICD-10-PCS | Mod: CPTII,S$GLB,, | Performed by: COLON & RECTAL SURGERY

## 2022-08-31 PROCEDURE — 36415 COLL VENOUS BLD VENIPUNCTURE: CPT | Performed by: STUDENT IN AN ORGANIZED HEALTH CARE EDUCATION/TRAINING PROGRAM

## 2022-08-31 PROCEDURE — 88341 PR IHC OR ICC EACH ADD'L SINGLE ANTIBODY  STAINPR: ICD-10-PCS | Mod: 26,,, | Performed by: PATHOLOGY

## 2022-08-31 PROCEDURE — 88341 IMHCHEM/IMCYTCHM EA ADD ANTB: CPT | Mod: 26,,, | Performed by: PATHOLOGY

## 2022-08-31 PROCEDURE — 3078F DIAST BP <80 MM HG: CPT | Mod: CPTII,S$GLB,, | Performed by: COLON & RECTAL SURGERY

## 2022-08-31 PROCEDURE — 88342 CHG IMMUNOCYTOCHEMISTRY: ICD-10-PCS | Mod: 26,,, | Performed by: PATHOLOGY

## 2022-08-31 PROCEDURE — 3077F SYST BP >= 140 MM HG: CPT | Mod: CPTII,S$GLB,, | Performed by: COLON & RECTAL SURGERY

## 2022-08-31 PROCEDURE — 3077F PR MOST RECENT SYSTOLIC BLOOD PRESSURE >= 140 MM HG: ICD-10-PCS | Mod: CPTII,S$GLB,, | Performed by: COLON & RECTAL SURGERY

## 2022-08-31 PROCEDURE — 1126F AMNT PAIN NOTED NONE PRSNT: CPT | Mod: CPTII,S$GLB,, | Performed by: COLON & RECTAL SURGERY

## 2022-08-31 PROCEDURE — 88342 IMHCHEM/IMCYTCHM 1ST ANTB: CPT | Mod: 26,,, | Performed by: PATHOLOGY

## 2022-08-31 PROCEDURE — 88305 TISSUE EXAM BY PATHOLOGIST: ICD-10-PCS | Mod: 26,,, | Performed by: PATHOLOGY

## 2022-08-31 PROCEDURE — 88305 TISSUE EXAM BY PATHOLOGIST: CPT | Performed by: PATHOLOGY

## 2022-08-31 PROCEDURE — 88305 TISSUE EXAM BY PATHOLOGIST: CPT | Mod: 26,,, | Performed by: PATHOLOGY

## 2022-08-31 NOTE — PROGRESS NOTES
HPI:  88 yo male with locally advanced low rectal cancer.       Status post HAYDEE     Excellent clinical response                                                          Oncology History         Rectal cancer         5/28/2021 Initial Diagnosis           Rectal cancer            6/1/2021 Cancer Staged           Staging form: Colon and Rectum, AJCC 8th Edition  - Clinical stage from 6/1/2021: Stage IIIB (cT3, cN1b, cM0)            6/15/2021 Notable Event           He was diagnosed after noting increasing stool frequency with fecal urgency as well as BRBPR. He underwent colonoscopy on 5/18/21 which noted a single sessile 6 mm polyp in the hepatic flexure, and ulcerated mass in the proximal rectum and mid rectum between 7-10 cm from the anus with biopsy demonstrating invasive moderately differentiated adenocarcinoma, MMR proficient.  MRI rectum with without contrast done on 06/01/2021 showed low/mid rectal tumor with less than 50% circumferential involvement and extension through the muscularis propria into the mesorectal fat, up to 2 abnormal mesorectal fat lymph nodes.  Patient was clinically diagnosed with cT3 cN1b Mx.  CT chest/abdomen/pelvis with contrast done on 06/01/2021 has shown subcentimeter pulmonary nodules [need f-up], no pathologically enlarged adenopathy within the chest, liver was normal, prominent mesenteric lymph nodes.  Evaluated by Dr. Dominick Boland on 06/03/2021 and noted recommendations for total neoadjuvant therapy with short course RT followed by 9 cycles of FOLFOX [given patient's age noted recommendation to drop bolus 5 FU].  Will need systemic imaging after 4 cycles of chemo.  And CT chest/abdomen and MRI rectum protocol 1 month after completion of FOLFOX.            6/15/2021 - 6/21/2021 Radiation Therapy           Treating physician: Michael Hernandez MD  Total Dose: 25 Gy  Fractions: 5     Treatment Summary  Course: C1 Pelvis 2021     Treatment Site Ref. ID Energy Dose/Fx (Gy) #Fx Dose  Correction (Gy) Total Dose (Gy) Start Date End Date Elapsed Days   IM PELVIS PTV fior 6X 5 5 / 5 0 25 6/15/2021 6/21/2021 6            7/7/2021 -  Chemotherapy           Treatment Summary   Plan Name: OP FOLFOX 6 Q2W  Treatment Goal: Curative  Status: Active  Start Date: 7/7/2021  End Date: 11/19/2021 (Planned)  Provider: Nusrat Bar MD  Chemotherapy: fluorouraciL 2,400 mg/m2 = 4,870 mg in sodium chloride 0.9% 101.2 mL chemo infusion, 2,400 mg/m2 = 4,870 mg, Intravenous, Over 46 hours, 5 of 9 cycles  Administration: 4,870 mg (7/7/2021), 4,870 mg (7/20/2021), 4,870 mg (8/16/2021), 4,870 mg (9/8/2021), 4,870 mg (9/22/2021)  leucovorin calcium 400 mg/m2 = 810 mg in dextrose 5 % 290.5 mL infusion, 400 mg/m2 = 810 mg, Intravenous, Clinic/HOD 1 time, 5 of 9 cycles  Administration: 810 mg (7/7/2021), 810 mg (7/20/2021), 810 mg (8/16/2021), 810 mg (9/8/2021), 810 mg (9/22/2021)  oxaliplatin (ELOXATIN) 85 mg/m2 = 173 mg in dextrose 5 % 534.6 mL chemo infusion, 85 mg/m2 = 173 mg, Intravenous, Clinic/HOD 1 time, 5 of 9 cycles  Dose modification: 65 mg/m2 (original dose 85 mg/m2, Cycle 3)  Administration: 173 mg (7/7/2021), 173 mg (7/20/2021), 132 mg (8/16/2021), 132 mg (9/8/2021), 132 mg (9/22/2021)            Secondary malignant neoplasm of intrapelvic lymph nodes         6/3/2021 Initial Diagnosis           Secondary malignant neoplasm of intrapelvic lymph nodes            7/7/2021 -  Chemotherapy           Treatment Summary   Plan Name: OP FOLFOX 6 Q2W  Treatment Goal: Curative  Status: Active  Start Date: 7/7/2021  End Date: 11/19/2021 (Planned)  Provider: Nusrat Bar MD  Chemotherapy: fluorouraciL 2,400 mg/m2 = 4,870 mg in sodium chloride 0.9% 101.2 mL chemo infusion, 2,400 mg/m2 = 4,870 mg, Intravenous, Over 46 hours, 5 of 9 cycles  Administration: 4,870 mg (7/7/2021), 4,870 mg (7/20/2021), 4,870 mg (8/16/2021), 4,870 mg (9/8/2021), 4,870 mg (9/22/2021)  leucovorin calcium 400 mg/m2 = 810 mg in dextrose 5 % 290.5  mL infusion, 400 mg/m2 = 810 mg, Intravenous, Clinic/HOD 1 time, 5 of 9 cycles  Administration: 810 mg (7/7/2021), 810 mg (7/20/2021), 810 mg (8/16/2021), 810 mg (9/8/2021), 810 mg (9/22/2021)  oxaliplatin (ELOXATIN) 85 mg/m2 = 173 mg in dextrose 5 % 534.6 mL chemo infusion, 85 mg/m2 = 173 mg, Intravenous, Clinic/HOD 1 time, 5 of 9 cycles  Dose modification: 65 mg/m2 (original dose 85 mg/m2, Cycle 3)  Administration: 173 mg (7/7/2021), 173 mg (7/20/2021), 132 mg (8/16/2021), 132 mg (9/8/2021), 132 mg (9/22/2021)                  Interval hx:   Pt elected watch and wait.  He feels well.  No pain, bleeding or change in bowels.  Appetite and energy levels good.       EXAMINATION:  MRI RECTAL CANCER WITHOUT CONTRAST     CLINICAL HISTORY:  Malignant neoplasm of rectum     TECHNIQUE:  Multiplanar multisequence imaging of the pelvis per rectal cancer restaging protocol.     COMPARISON:  MRI rectal cancer protocol from 12/21/2021, 09/21/2021, and 06/01/2021.     Pretreatment Tumor Staging: [id number]     Prior Treatment: Chemo and radiation therapy     FINDINGS:  Persistent T2 hypointense scar at treatment site in the left anterolateral aspect of the rectum.  No discrete measurable lesion or abnormal diffusion restriction.     TREATED TUMOR/TUMOR BED CHARACTERISTICS:     *DWI - restricted diffusion (with associated low ADC) in tumor or tumor bed: No discrete diffusion restricting lesion.  Slight heterogeneous diffusion restriction about treatment site, favored to represent artifact.     *T2: The primary tumor and extramural disease shows Entirely dark T2 signal/scar     Distance of inferior margin of treated tumor/treated area to the anal verge: 5.5 cm     Distance of inferior margin of treated tumor/treated area to the top of the sphincter complex/anorectal junction: 2.5 cm     Relationship to anterior peritoneal reflection: Below     Craniocaudal length: 2.6 cm     Previous craniocaudal length: 2.5 cm     Maximal wall  thickness: 7 mm     Previous wall thickness: 6 mm     LOW RECTAL TUMORS: Invasion of anal sphincter complex: Absent     *EMVI:  No (none evident pre-treatment)     *LYMPH NODES     Mesorectal/superior rectal lymph nodes and/or tumor deposits: Persistent enlarged 6 mm left mesorectal lymph node with somewhat rounded and spiculated morphology (series 10, image 37).  Previously measured 6 mm.  No new suspicious lymph nodes.     Extra mesorectal lymph nodes: No     MISCELLANEOUS: Trace free fluid in the pelvis.  Extensive sigmoid diverticulosis.  Prostate is surgically absent.  Stable T1 hypointense focus in the right iliac bone, possibly represents bone island.     Impression:     *TUMOR     Compared toMRI rectal cancer protocol from 12/21/2021, post treatment primary tumor assessment: Possible complete/near complete response     Good Response-dense fibrosis>75%.  No discrete measurable lesion.  No abnormal diffusion restriction to suggest local recurrence/residual disease.     *NODES     Suspicious Mesorectal Lymph nodes: Yes (persistently enlarged left mesorectal lymph node as above)     Suspicious Extramesorectal Lymph nodes: No     Electronically signed by resident: Luis Fernando Palacio MD  Date:                                            03/23/2022  Time:                                           11:14     Electronically signed by: Luis Fernando Palacio MD  Date:                                            03/23/2022  Time:                                           13:25    8-  Interval hx:    Feels well.  Working.  Cutting lawn.  Continent.          Past Medical History:   Diagnosis Date    Cancer     GERD without esophagitis     Hiatal hernia 05/18/2021    normal mucose in second part of the duodenum     Hypercholesterolemia     Hypertension     Hypothyroidism     Left groin hernia     Prostate cancer 1999    Rectal cancer         Past Surgical History:   Procedure Laterality Date    APPENDECTOMY      COLONOSCOPY  "W/ BIOPSIES  5.18.21 polyp in hepatic flexure,    ESOPHAGOGASTRODUODENOSCOPY  05/18/2021    POLYPECTOMY  05/21/2021    masss in the proximal rectum and mid rectum ( biopsy)    PROSTATECTOMY  1999       Review of patient's allergies indicates:  No Known Allergies    Family History   Problem Relation Age of Onset    Stroke Mother     Cancer Father     Cancer Brother     Clotting disorder Brother     Coronary artery disease Brother        Social History     Socioeconomic History    Marital status:    Tobacco Use    Smoking status: Never    Smokeless tobacco: Never   Substance and Sexual Activity    Alcohol use: Never    Drug use: Never    Sexual activity: Not Currently       ROS:  GENERAL: No fever, chills, fatigability or weight loss.  Integument: No rashes, redness, icterus  CHEST: Denies WALKER, cyanosis, wheezing, cough and sputum production.  CARDIOVASCULAR: Denies chest pain, PND, orthopnea or reduced exercise tolerance.  GI: Denies abd pain, dysphagia, nausea, vomiting, no hematemesis   : Denies burning on urination, no hematuria, no bacteriuria  MSK: No deformities, swelling, joint pain swelling  Neurologic: No HAs, seizures, weakness, paresthesias, gait problems    PE:  General appearance well  BP (!) 170/73 (BP Location: Left arm, Patient Position: Sitting, BP Method: Large (Automatic))   Pulse 83   Ht 5' 10" (1.778 m)   Wt 74.4 kg (164 lb 1.6 oz)   BMI 23.55 kg/m²     Sclera/ Skin anicteric  LN none palpable  AT NC EOMI  Neck supple trachea midline   Chest symmetric, nl excursion, no retractions, breathing comfortably  Abdomen  ND soft NT.  no masses, no organomegaly  EXT - no CCE  Neuro:  Mood/ affect nl, alert and oriented x 3, moves all ext's, gait nl    Rectal  Inspection nl anus  JANKI palpable anterior rectal mass      Assessment:  Regrowth of rectal cancer, biopsies pending  Good performance status    Plan:  See flex sigmoidoscopy  Discussed with pt.    Will review MRI pelvis, await " biopsies  MDT presentation if positive.

## 2022-08-31 NOTE — PROGRESS NOTES
CRS Office Visit Follow-up  Referring Md:   No referring provider defined for this encounter.    SUBJECTIVE:     Chief Complaint: F/u locally advanced low rectal cancer    History of Present Illness:  Patient is a 87 y.o. male PMH T3N1M0 rectal cancer s/p HAYDEE with complete response. Flexible sigmoidoscopy on 3/23/22 with ulcerative firm mass, negative biopsy. Pt was discussed at MDT with recommendation for EUA with repeat deeper biopsies. This plan was discussed with patient, who elected to wait 3 months for a repeat flex sig.                                               Oncology History         Rectal cancer         5/28/2021 Initial Diagnosis           Rectal cancer            6/1/2021 Cancer Staged           Staging form: Colon and Rectum, AJCC 8th Edition  - Clinical stage from 6/1/2021: Stage IIIB (cT3, cN1b, cM0)            6/15/2021 Notable Event           He was diagnosed after noting increasing stool frequency with fecal urgency as well as BRBPR. He underwent colonoscopy on 5/18/21 which noted a single sessile 6 mm polyp in the hepatic flexure, and ulcerated mass in the proximal rectum and mid rectum between 7-10 cm from the anus with biopsy demonstrating invasive moderately differentiated adenocarcinoma, MMR proficient.  MRI rectum with without contrast done on 06/01/2021 showed low/mid rectal tumor with less than 50% circumferential involvement and extension through the muscularis propria into the mesorectal fat, up to 2 abnormal mesorectal fat lymph nodes.  Patient was clinically diagnosed with cT3 cN1b Mx.  CT chest/abdomen/pelvis with contrast done on 06/01/2021 has shown subcentimeter pulmonary nodules [need f-up], no pathologically enlarged adenopathy within the chest, liver was normal, prominent mesenteric lymph nodes.  Evaluated by Dr. Dominick Boland on 06/03/2021 and noted recommendations for total neoadjuvant therapy with short course RT followed by 9 cycles of FOLFOX [given patient's age noted  recommendation to drop bolus 5 FU].  Will need systemic imaging after 4 cycles of chemo.  And CT chest/abdomen and MRI rectum protocol 1 month after completion of FOLFOX.            6/15/2021 - 6/21/2021 Radiation Therapy           Treating physician: Michael Hernandez MD  Total Dose: 25 Gy  Fractions: 5     Treatment Summary  Course: C1 Pelvis 2021     Treatment Site Ref. ID Energy Dose/Fx (Gy) #Fx Dose Correction (Gy) Total Dose (Gy) Start Date End Date Elapsed Days   IM PELVIS PTV fior 6X 5 5 / 5 0 25 6/15/2021 6/21/2021 6           7/7/2021 -  Chemotherapy           Treatment Summary   Plan Name: OP FOLFOX 6 Q2W  Treatment Goal: Curative  Status: Active  Start Date: 7/7/2021  End Date: 11/19/2021 (Planned)  Provider: Nusrat Bar MD  Chemotherapy: fluorouraciL 2,400 mg/m2 = 4,870 mg in sodium chloride 0.9% 101.2 mL chemo infusion, 2,400 mg/m2 = 4,870 mg, Intravenous, Over 46 hours, 5 of 9 cycles  Administration: 4,870 mg (7/7/2021), 4,870 mg (7/20/2021), 4,870 mg (8/16/2021), 4,870 mg (9/8/2021), 4,870 mg (9/22/2021)  leucovorin calcium 400 mg/m2 = 810 mg in dextrose 5 % 290.5 mL infusion, 400 mg/m2 = 810 mg, Intravenous, Clinic/HOD 1 time, 5 of 9 cycles  Administration: 810 mg (7/7/2021), 810 mg (7/20/2021), 810 mg (8/16/2021), 810 mg (9/8/2021), 810 mg (9/22/2021)  oxaliplatin (ELOXATIN) 85 mg/m2 = 173 mg in dextrose 5 % 534.6 mL chemo infusion, 85 mg/m2 = 173 mg, Intravenous, Clinic/HOD 1 time, 5 of 9 cycles  Dose modification: 65 mg/m2 (original dose 85 mg/m2, Cycle 3)  Administration: 173 mg (7/7/2021), 173 mg (7/20/2021), 132 mg (8/16/2021), 132 mg (9/8/2021), 132 mg (9/22/2021)            Secondary malignant neoplasm of intrapelvic lymph nodes         6/3/2021 Initial Diagnosis           Secondary malignant neoplasm of intrapelvic lymph nodes            7/7/2021 -  Chemotherapy           Treatment Summary   Plan Name: OP FOLFOX 6 Q2W  Treatment Goal: Curative  Status: Active  Start Date: 7/7/2021  End Date:  "11/19/2021 (Planned)  Provider: Nusrat Bar MD  Chemotherapy: fluorouraciL 2,400 mg/m2 = 4,870 mg in sodium chloride 0.9% 101.2 mL chemo infusion, 2,400 mg/m2 = 4,870 mg, Intravenous, Over 46 hours, 5 of 9 cycles  Administration: 4,870 mg (7/7/2021), 4,870 mg (7/20/2021), 4,870 mg (8/16/2021), 4,870 mg (9/8/2021), 4,870 mg (9/22/2021)  leucovorin calcium 400 mg/m2 = 810 mg in dextrose 5 % 290.5 mL infusion, 400 mg/m2 = 810 mg, Intravenous, Clinic/HOD 1 time, 5 of 9 cycles  Administration: 810 mg (7/7/2021), 810 mg (7/20/2021), 810 mg (8/16/2021), 810 mg (9/8/2021), 810 mg (9/22/2021)  oxaliplatin (ELOXATIN) 85 mg/m2 = 173 mg in dextrose 5 % 534.6 mL chemo infusion, 85 mg/m2 = 173 mg, Intravenous, Clinic/HOD 1 time, 5 of 9 cycles  Dose modification: 65 mg/m2 (original dose 85 mg/m2, Cycle 3)  Administration: 173 mg (7/7/2021), 173 mg (7/20/2021), 132 mg (8/16/2021), 132 mg (9/8/2021), 132 mg (9/22/2021)                  Interval hx:         Review of Systems:  ROS    OBJECTIVE:     Vital Signs (Most Recent)  BP (!) 170/73 (BP Location: Left arm, Patient Position: Sitting, BP Method: Large (Automatic))   Pulse 83   Ht 5' 10" (1.778 m)   Wt 74.4 kg (164 lb 1.6 oz)   BMI 23.55 kg/m²     Physical Exam:  General: White male in no distress   Neuro: alert and oriented x 4.  Moves all extremities.     HEENT: no icterus.  Trachea midline  Respiratory: respirations are even and unlabored  Cardiac: regular rate  Abdomen: ***  Extremities: Warm dry and intact  Skin: no rashes  Anorectal: ***    Labs: ***    Imaging: ***      ASSESSMENT/PLAN:     There are no diagnoses linked to this encounter.    87M c T3N1M0 rectal cancer s/p HAYDEE with complete response, on watch and wait protocol. Ulcerative mass seen on surveillance sigmoidoscopy (3/2022) with negative biopsy.    -    "

## 2022-09-09 LAB
COMMENT: NORMAL
FINAL PATHOLOGIC DIAGNOSIS: NORMAL
GROSS: NORMAL
Lab: NORMAL

## 2022-09-14 ENCOUNTER — HOSPITAL ENCOUNTER (OUTPATIENT)
Dept: RADIOLOGY | Facility: HOSPITAL | Age: 87
Discharge: HOME | End: 2022-09-14
Attending: STUDENT IN AN ORGANIZED HEALTH CARE EDUCATION/TRAINING PROGRAM
Payer: COMMERCIAL

## 2022-09-14 ENCOUNTER — OFFICE VISIT (OUTPATIENT)
Dept: SURGERY | Facility: CLINIC | Age: 87
End: 2022-09-14
Payer: COMMERCIAL

## 2022-09-14 VITALS
SYSTOLIC BLOOD PRESSURE: 144 MMHG | HEART RATE: 62 BPM | HEIGHT: 70 IN | WEIGHT: 163.31 LBS | BODY MASS INDEX: 23.38 KG/M2 | DIASTOLIC BLOOD PRESSURE: 67 MMHG

## 2022-09-14 DIAGNOSIS — C20 RECTAL CANCER: ICD-10-CM

## 2022-09-14 DIAGNOSIS — C20 RECTAL CANCER: Primary | ICD-10-CM

## 2022-09-14 DIAGNOSIS — R91.1 LUNG NODULE: ICD-10-CM

## 2022-09-14 PROCEDURE — 1101F PR PT FALLS ASSESS DOC 0-1 FALLS W/OUT INJ PAST YR: ICD-10-PCS | Mod: CPTII,S$GLB,, | Performed by: COLON & RECTAL SURGERY

## 2022-09-14 PROCEDURE — 72195 MRI PELVIS W/O DYE: CPT | Mod: TC

## 2022-09-14 PROCEDURE — 1159F PR MEDICATION LIST DOCUMENTED IN MEDICAL RECORD: ICD-10-PCS | Mod: CPTII,S$GLB,, | Performed by: COLON & RECTAL SURGERY

## 2022-09-14 PROCEDURE — 1126F PR PAIN SEVERITY QUANTIFIED, NO PAIN PRESENT: ICD-10-PCS | Mod: CPTII,S$GLB,, | Performed by: COLON & RECTAL SURGERY

## 2022-09-14 PROCEDURE — 1126F AMNT PAIN NOTED NONE PRSNT: CPT | Mod: CPTII,S$GLB,, | Performed by: COLON & RECTAL SURGERY

## 2022-09-14 PROCEDURE — 99999 PR PBB SHADOW E&M-EST. PATIENT-LVL III: ICD-10-PCS | Mod: PBBFAC,,, | Performed by: COLON & RECTAL SURGERY

## 2022-09-14 PROCEDURE — 3288F FALL RISK ASSESSMENT DOCD: CPT | Mod: CPTII,S$GLB,, | Performed by: COLON & RECTAL SURGERY

## 2022-09-14 PROCEDURE — 72195 MRI PELVIS W/O DYE: CPT | Mod: 26,,, | Performed by: RADIOLOGY

## 2022-09-14 PROCEDURE — 99999 PR PBB SHADOW E&M-EST. PATIENT-LVL III: CPT | Mod: PBBFAC,,, | Performed by: COLON & RECTAL SURGERY

## 2022-09-14 PROCEDURE — 99214 PR OFFICE/OUTPT VISIT, EST, LEVL IV, 30-39 MIN: ICD-10-PCS | Mod: S$GLB,,, | Performed by: COLON & RECTAL SURGERY

## 2022-09-14 PROCEDURE — 72195 MRI RECTAL CANCER WITHOUT CONTRAST: ICD-10-PCS | Mod: 26,,, | Performed by: RADIOLOGY

## 2022-09-14 PROCEDURE — 3078F DIAST BP <80 MM HG: CPT | Mod: CPTII,S$GLB,, | Performed by: COLON & RECTAL SURGERY

## 2022-09-14 PROCEDURE — 1159F MED LIST DOCD IN RCRD: CPT | Mod: CPTII,S$GLB,, | Performed by: COLON & RECTAL SURGERY

## 2022-09-14 PROCEDURE — 3077F SYST BP >= 140 MM HG: CPT | Mod: CPTII,S$GLB,, | Performed by: COLON & RECTAL SURGERY

## 2022-09-14 PROCEDURE — 3077F PR MOST RECENT SYSTOLIC BLOOD PRESSURE >= 140 MM HG: ICD-10-PCS | Mod: CPTII,S$GLB,, | Performed by: COLON & RECTAL SURGERY

## 2022-09-14 PROCEDURE — 99214 OFFICE O/P EST MOD 30 MIN: CPT | Mod: S$GLB,,, | Performed by: COLON & RECTAL SURGERY

## 2022-09-14 PROCEDURE — 3078F PR MOST RECENT DIASTOLIC BLOOD PRESSURE < 80 MM HG: ICD-10-PCS | Mod: CPTII,S$GLB,, | Performed by: COLON & RECTAL SURGERY

## 2022-09-14 PROCEDURE — 1101F PT FALLS ASSESS-DOCD LE1/YR: CPT | Mod: CPTII,S$GLB,, | Performed by: COLON & RECTAL SURGERY

## 2022-09-14 PROCEDURE — 3288F PR FALLS RISK ASSESSMENT DOCUMENTED: ICD-10-PCS | Mod: CPTII,S$GLB,, | Performed by: COLON & RECTAL SURGERY

## 2022-09-14 NOTE — PROGRESS NOTES
HPI:  Prosper Soliz is a 87 y.o. male with history of regrowth of rectal cancer following initial watch and wait.                                                           Oncology History         Rectal cancer         5/28/2021 Initial Diagnosis           Rectal cancer            6/1/2021 Cancer Staged           Staging form: Colon and Rectum, AJCC 8th Edition  - Clinical stage from 6/1/2021: Stage IIIB (cT3, cN1b, cM0)            6/15/2021 Notable Event           He was diagnosed after noting increasing stool frequency with fecal urgency as well as BRBPR. He underwent colonoscopy on 5/18/21 which noted a single sessile 6 mm polyp in the hepatic flexure, and ulcerated mass in the proximal rectum and mid rectum between 7-10 cm from the anus with biopsy demonstrating invasive moderately differentiated adenocarcinoma, MMR proficient.  MRI rectum with without contrast done on 06/01/2021 showed low/mid rectal tumor with less than 50% circumferential involvement and extension through the muscularis propria into the mesorectal fat, up to 2 abnormal mesorectal fat lymph nodes.  Patient was clinically diagnosed with cT3 cN1b Mx.  CT chest/abdomen/pelvis with contrast done on 06/01/2021 has shown subcentimeter pulmonary nodules [need f-up], no pathologically enlarged adenopathy within the chest, liver was normal, prominent mesenteric lymph nodes.  Evaluated by Dr. Dominick Boland on 06/03/2021 and noted recommendations for total neoadjuvant therapy with short course RT followed by 9 cycles of FOLFOX [given patient's age noted recommendation to drop bolus 5 FU].  Will need systemic imaging after 4 cycles of chemo.  And CT chest/abdomen and MRI rectum protocol 1 month after completion of FOLFOX.            6/15/2021 - 6/21/2021 Radiation Therapy           Treating physician: Michael Hernandez MD  Total Dose: 25 Gy  Fractions: 5     Treatment Summary  Course: C1 Pelvis 2021     Treatment Site Ref. ID Energy Dose/Fx (Gy) #Fx Dose  Correction (Gy) Total Dose (Gy) Start Date End Date Elapsed Days   IM PELVIS PTV fior 6X 5 5 / 5 0 25 6/15/2021 6/21/2021 6            7/7/2021 -  Chemotherapy           Treatment Summary   Plan Name: OP FOLFOX 6 Q2W  Treatment Goal: Curative  Status: Active  Start Date: 7/7/2021  End Date: 11/19/2021 (Planned)  Provider: Nusrat Bar MD  Chemotherapy: fluorouraciL 2,400 mg/m2 = 4,870 mg in sodium chloride 0.9% 101.2 mL chemo infusion, 2,400 mg/m2 = 4,870 mg, Intravenous, Over 46 hours, 5 of 9 cycles  Administration: 4,870 mg (7/7/2021), 4,870 mg (7/20/2021), 4,870 mg (8/16/2021), 4,870 mg (9/8/2021), 4,870 mg (9/22/2021)  leucovorin calcium 400 mg/m2 = 810 mg in dextrose 5 % 290.5 mL infusion, 400 mg/m2 = 810 mg, Intravenous, Clinic/HOD 1 time, 5 of 9 cycles  Administration: 810 mg (7/7/2021), 810 mg (7/20/2021), 810 mg (8/16/2021), 810 mg (9/8/2021), 810 mg (9/22/2021)  oxaliplatin (ELOXATIN) 85 mg/m2 = 173 mg in dextrose 5 % 534.6 mL chemo infusion, 85 mg/m2 = 173 mg, Intravenous, Clinic/HOD 1 time, 5 of 9 cycles  Dose modification: 65 mg/m2 (original dose 85 mg/m2, Cycle 3)  Administration: 173 mg (7/7/2021), 173 mg (7/20/2021), 132 mg (8/16/2021), 132 mg (9/8/2021), 132 mg (9/22/2021)            Secondary malignant neoplasm of intrapelvic lymph nodes         6/3/2021 Initial Diagnosis           Secondary malignant neoplasm of intrapelvic lymph nodes            7/7/2021 -  Chemotherapy           Treatment Summary   Plan Name: OP FOLFOX 6 Q2W  Treatment Goal: Curative  Status: Active  Start Date: 7/7/2021  End Date: 11/19/2021 (Planned)  Provider: Nusrat Bar MD  Chemotherapy: fluorouraciL 2,400 mg/m2 = 4,870 mg in sodium chloride 0.9% 101.2 mL chemo infusion, 2,400 mg/m2 = 4,870 mg, Intravenous, Over 46 hours, 5 of 9 cycles  Administration: 4,870 mg (7/7/2021), 4,870 mg (7/20/2021), 4,870 mg (8/16/2021), 4,870 mg (9/8/2021), 4,870 mg (9/22/2021)  leucovorin calcium 400 mg/m2 = 810 mg in dextrose 5 % 290.5  mL infusion, 400 mg/m2 = 810 mg, Intravenous, Clinic/HOD 1 time, 5 of 9 cycles  Administration: 810 mg (7/7/2021), 810 mg (7/20/2021), 810 mg (8/16/2021), 810 mg (9/8/2021), 810 mg (9/22/2021)  oxaliplatin (ELOXATIN) 85 mg/m2 = 173 mg in dextrose 5 % 534.6 mL chemo infusion, 85 mg/m2 = 173 mg, Intravenous, Clinic/HOD 1 time, 5 of 9 cycles  Dose modification: 65 mg/m2 (original dose 85 mg/m2, Cycle 3)  Administration: 173 mg (7/7/2021), 173 mg (7/20/2021), 132 mg (8/16/2021), 132 mg (9/8/2021), 132 mg (9/22/2021)                    Interval hx:   He denies pain or bleeding  He is seen today with his son Cole    MRI pelvis  FINDINGS:  Persistent T2 hypointense signal along the left anterolateral aspect of the rectum (series 9, images 14-15), suggestive of scarring, but slightly more prominent than the prior exam.     TREATED TUMOR/TUMOR BED CHARACTERISTICS:     *DWI - restricted diffusion (with associated low ADC) in tumor or tumor bed: New 6 mm focus of restricted diffusion in the tumor bed (series 7, image 27).     *T2: The primary tumor and extramural disease shows primarily dark T2 signal, suggestive of scarring, but appears slightly more prominent compared to the prior exam (series 9, images 14-15).     Distance of inferior margin of treated tumor/treated area to the anal verge: 5.6 cm     Distance of inferior margin of treated tumor/treated area to the top of the sphincter complex/anorectal junction: 2.8 cm     Relationship to anterior peritoneal reflection: Below     Craniocaudal length: 3.0 cm     Previous craniocaudal length: 2.6 cm     Maximal wall thickness: 0.9 cm     Previous wall thickness: 0.7 cm     LOW RECTAL TUMORS: Invasion of anal sphincter complex: Absent     *EMVI: No (none evident pre-treatment)     *LYMPH NODES     Mesorectal/superior rectal lymph nodes and/or tumor deposits: Persistent 0.6 cm rounded morphology (series 8, image 44), previously measured 0.6 cm.  No new lymph nodes  identified.     Extra mesorectal lymph nodes: No     Additional findings: Since the sigmoid colon diverticulosis.  Prostate is surgically absent.     Impression:     *TUMOR     Compared to the 03/23/2022 exam, post treatment primary tumor assessment: Possible small focus of tumor recurrence given new 8 mm focus of diffusion restriction, as above.  Persistent hypointense T2 signal noted at the tumor site, suggestive of scarring, but slightly more prominent.     *NODES     Suspicious Mesorectal Lymph nodes: Yes, mildly enlarged left mesorectal lymph nodes (6 mm), stable.  No new mesorectal lymph nodes.     Suspicious Extramesorectal Lymph nodes: No     This report was flagged in Epic as abnormal.     Electronically signed by resident: Blake Dill  Date:                                            09/14/2022  Time:                                           11:10     Electronically signed by: Ryan Ramos MD  Date:                                            09/14/2022  Time:                                           13:56        Past Medical History:   Diagnosis Date    Cancer     GERD without esophagitis     Hiatal hernia 05/18/2021    normal mucose in second part of the duodenum     Hypercholesterolemia     Hypertension     Hypothyroidism     Left groin hernia     Prostate cancer 1999    Rectal cancer 05/2021        Past Surgical History:   Procedure Laterality Date    APPENDECTOMY      COLONOSCOPY W/ BIOPSIES  5.18.21 polyp in hepatic flexure,    ESOPHAGOGASTRODUODENOSCOPY  05/18/2021    POLYPECTOMY  05/21/2021    masss in the proximal rectum and mid rectum ( biopsy)    PROSTATECTOMY  1999       Review of patient's allergies indicates:  No Known Allergies    Family History   Problem Relation Age of Onset    Stroke Mother     Cancer Father     Cancer Brother     Clotting disorder Brother     Coronary artery disease Brother        Social History     Socioeconomic History    Marital status:    Tobacco Use     "Smoking status: Never    Smokeless tobacco: Never   Substance and Sexual Activity    Alcohol use: Never    Drug use: Never    Sexual activity: Not Currently       ROS:  GENERAL: No fever, chills, fatigability or weight loss.  Integument: No rashes, redness, icterus  CHEST: Denies WALKER, cyanosis, wheezing, cough and sputum production.  CARDIOVASCULAR: Denies chest pain, PND, orthopnea or reduced exercise tolerance.  GI: Denies abd pain, dysphagia, nausea, vomiting, no hematemesis   : Denies burning on urination, no hematuria, no bacteriuria  MSK: No deformities, swelling, joint pain swelling  Neurologic: No HAs, seizures, weakness, paresthesias, gait problems    PE:  General appearance well  BP (!) 144/67 (BP Location: Right arm, Patient Position: Sitting, BP Method: Large (Automatic))   Pulse 62   Ht 5' 10" (1.778 m)   Wt 74.1 kg (163 lb 4.8 oz)   BMI 23.43 kg/m²     Sclera/ Skin anicteric  AT NC EOMI  Neck supple trachea midline   Chest symmetric, nl excursion, no retractions, breathing comfortably  EXT - no CCE  Neuro:  Mood/ affect nl, alert and oriented x 3, moves all ext's, gait nl      Assessment:  Regrowth of rectal CA following initial watch and wait, biopsy proven.    Nodule of right lung base on CT chest    Plan:  MRI pelvis pending  Will discuss MDT next week.  Discussed role of surgery.   * Lung nodule may change this and systemic chemoRX may be initial treatment.         "

## 2022-09-14 NOTE — [employer]OVATION PATIENT INSTRUCTIONS
Discharge Summary/Instructions after an Endoscopic Procedure  Patient Name: Prosper Soliz  Patient MRN: [id number]  Patient YOB: 1935 Wednesday, August 31, 2022  Robert Jensen MD  Dear patient,  As a result of recent federal legislation (The Federal Cures Act), you may   receive lab or pathology results from your procedure in your [facility]   account before your physician is able to contact you. Your physician or   their representative will relay the results to you with their   recommendations at their soonest availability.  Thank you,  RESTRICTIONS:  During your procedure today, you received medications for sedation.  These   medications may affect your judgment, balance and coordination.  Therefore,   for 24 hours, you have the following restrictions:   - DO NOT drive a car, operate machinery, make legal/financial decisions,   sign important papers or drink alcohol.    ACTIVITY:  Today: no heavy lifting, straining or running due to procedural   sedation/anesthesia.  The following day: return to full activity including work.  DIET:  Eat and drink normally unless instructed otherwise.     TREATMENT FOR COMMON SIDE EFFECTS:  - Mild abdominal pain, nausea, belching, bloating or excessive gas:  rest,   eat lightly and use a heating pad.  - Sore Throat: treat with throat lozenges and/or gargle with warm salt   water.  - Because air was used during the procedure, expelling large amounts of air   from your rectum or belching is normal.  - If a bowel prep was taken, you may not have a bowel movement for 1-3 days.    This is normal.  SYMPTOMS TO WATCH FOR AND REPORT TO YOUR PHYSICIAN:  1. Abdominal pain or bloating, other than gas cramps.  2. Chest pain.  3. Back pain.  4. Signs of infection such as: chills or fever occurring within 24 hours   after the procedure.  5. Rectal bleeding, which would show as bright red, maroon, or black stools.   (A tablespoon of blood from the rectum is not serious, especially if    hemorrhoids are present.)  6. Vomiting.  7. Weakness or dizziness.  GO DIRECTLY TO THE NEAREST EMERGENCY ROOM IF YOU HAVE ANY OF THE FOLLOWING:      Difficulty breathing              Chills and/or fever over 101 F   Persistent vomiting and/or vomiting blood   Severe abdominal pain   Severe chest pain   Black, tarry stools   Bleeding- more than one tablespoon   Any other symptom or condition that you feel may need urgent attention  Your doctor recommends these additional instructions:  If any biopsies were taken, your doctors clinic will contact you in 1 to 2   weeks with any results.  - Discharge patient to home (ambulatory).   - Patient has a contact number available for emergencies.  The signs and   symptoms of potential delayed complications were discussed with the   patient.  Return to normal activities tomorrow.  Written discharge   instructions were provided to the patient.   - Resume previous diet.   - Continue present medications.   - Return to my office in 1 week.   - Repeat flexible sigmoidoscopy in 6 months for surveillance.  For questions, problems or results please call your physician - Robert Jensen MD at Work:  [phone].  [facility], EMERGENCY ROOM PHONE NUMBER: [phone]  IF A COMPLICATION OR EMERGENCY SITUATION ARISES AND YOU ARE UNABLE TO REACH   YOUR PHYSICIAN - GO DIRECTLY TO THE EMERGENCY ROOM.  Robert Jensen MD  9/14/2022 4:36:34 PM  This report has been verified and signed electronically.  Dear patient,  As a result of recent federal legislation (The Federal Cures Act), you may   receive lab or pathology results from your procedure in your [facility]   account before your physician is able to contact you. Your physician or   their representative will relay the results to you with their   recommendations at their soonest availability.  Thank you,  PROVATION

## 2022-09-14 NOTE — Clinical Note
Nusrat We will present him at our MDT next WED.  Would you like to be on that conference call by ZOOM?  Please forward your email and we can send you a ZOOM invite.  The MDT is at 0700 on WED.   Robert

## 2022-09-19 NOTE — PROGRESS NOTES
[facility]  Colon, Rectal and Anal Malignancies  Multi-disciplinary Tumor Board     [address]  [place], [place]  Tel: [phone]  Fax: [phone]  [email or web address]     Patient name: Prosper Soliz   YOB: 1935   MRN: [id number]    Date of discussion: 9/21/2022    Thank you for referring Mr. Soliz to our care here at [facility]. Please allow us to summarize our review of records and recommendations.    The following disciplines were present for the discussion:   Colon and Rectal Surgery  Medical Oncology  Radiation Oncology  Pathology  Radiology    Endoscopy reviewed:   Date performed: 8/31/2022  Flexible Sigmoidoscopy    CT Chest, Abdomen and Pelvis   Date performed: 4/11/2022   Performed at our facility: Yes  Metastatic disease present: No    MRI Rectal Cancer Protocol  Date performed: 9/14/2022   Performed at our facility: Yes  Tumor location in the rectum: Lower (0-5 cm)  Sphincter involvement: Absent  Pretreatment circumferential resection margin status: Not performed    Pathology reviewed:   Date pathology finalized: 8/31/2022  Yes, slides reviewed     Pre-treatment CEA:  Date performed: 8/31/2022  CEA Level:  2.0    Pre-treatment Clinical Stage:  T3 - Tumor invades the muscularis propria  N+ - Desirae disease is suspected  M0 - No metastasis suspected    Based on our review of the current information we have made the following recommendations:  87M c T3N1M0 rectal cancer s/p HAYDEE with complete response, on watch and wait protocol, now with recurrence. Recommendation is for surgical resection.       Clinical research study eligibility - No      Please do not hesitate to contact us for any questions.

## 2022-09-20 ENCOUNTER — DOCUMENTATION ONLY (OUTPATIENT)
Dept: SURGERY | Facility: CLINIC | Age: 87
End: 2022-09-20

## 2022-09-21 ENCOUNTER — DOCUMENTATION ONLY (OUTPATIENT)
Dept: SURGERY | Facility: CLINIC | Age: 87
End: 2022-09-21
Payer: COMMERCIAL

## 2022-09-23 ENCOUNTER — TELEPHONE (OUTPATIENT)
Dept: SURGERY | Facility: CLINIC | Age: 87
End: 2022-09-23
Payer: COMMERCIAL

## 2022-10-11 ENCOUNTER — PATIENT MESSAGE (OUTPATIENT)
Dept: SURGERY | Facility: CLINIC | Age: 87
End: 2022-10-11
Payer: COMMERCIAL

## 2022-10-20 ENCOUNTER — TELEPHONE (OUTPATIENT)
Dept: SURGERY | Facility: CLINIC | Age: 87
End: 2022-10-20
Payer: COMMERCIAL

## 2022-10-20 NOTE — TELEPHONE ENCOUNTER
Called pt to let him know I rescheduled his appt with Dr Jensen to 10/26/22 @ 11:20am. Cannot leave a message on his phone. Left this message with both sons.

## 2022-10-26 ENCOUNTER — OFFICE VISIT (OUTPATIENT)
Dept: SURGERY | Facility: CLINIC | Age: 87
End: 2022-10-26
Payer: COMMERCIAL

## 2022-10-26 VITALS
BODY MASS INDEX: 24.24 KG/M2 | HEIGHT: 70 IN | WEIGHT: 169.31 LBS | DIASTOLIC BLOOD PRESSURE: 64 MMHG | HEART RATE: 62 BPM | SYSTOLIC BLOOD PRESSURE: 135 MMHG

## 2022-10-26 DIAGNOSIS — Z85.048 ENCOUNTER FOR FOLLOW-UP SURVEILLANCE OF RECTAL CANCER: Primary | ICD-10-CM

## 2022-10-26 DIAGNOSIS — Z08 ENCOUNTER FOR FOLLOW-UP SURVEILLANCE OF RECTAL CANCER: Primary | ICD-10-CM

## 2022-10-26 PROCEDURE — 1126F PR PAIN SEVERITY QUANTIFIED, NO PAIN PRESENT: ICD-10-PCS | Mod: CPTII,S$GLB,, | Performed by: COLON & RECTAL SURGERY

## 2022-10-26 PROCEDURE — 3288F FALL RISK ASSESSMENT DOCD: CPT | Mod: CPTII,S$GLB,, | Performed by: COLON & RECTAL SURGERY

## 2022-10-26 PROCEDURE — 3078F DIAST BP <80 MM HG: CPT | Mod: CPTII,S$GLB,, | Performed by: COLON & RECTAL SURGERY

## 2022-10-26 PROCEDURE — 1159F PR MEDICATION LIST DOCUMENTED IN MEDICAL RECORD: ICD-10-PCS | Mod: CPTII,S$GLB,, | Performed by: COLON & RECTAL SURGERY

## 2022-10-26 PROCEDURE — 99214 PR OFFICE/OUTPT VISIT, EST, LEVL IV, 30-39 MIN: ICD-10-PCS | Mod: S$GLB,,, | Performed by: COLON & RECTAL SURGERY

## 2022-10-26 PROCEDURE — 99999 PR PBB SHADOW E&M-EST. PATIENT-LVL III: CPT | Mod: PBBFAC,,, | Performed by: COLON & RECTAL SURGERY

## 2022-10-26 PROCEDURE — 3078F PR MOST RECENT DIASTOLIC BLOOD PRESSURE < 80 MM HG: ICD-10-PCS | Mod: CPTII,S$GLB,, | Performed by: COLON & RECTAL SURGERY

## 2022-10-26 PROCEDURE — 1126F AMNT PAIN NOTED NONE PRSNT: CPT | Mod: CPTII,S$GLB,, | Performed by: COLON & RECTAL SURGERY

## 2022-10-26 PROCEDURE — 1159F MED LIST DOCD IN RCRD: CPT | Mod: CPTII,S$GLB,, | Performed by: COLON & RECTAL SURGERY

## 2022-10-26 PROCEDURE — 3075F SYST BP GE 130 - 139MM HG: CPT | Mod: CPTII,S$GLB,, | Performed by: COLON & RECTAL SURGERY

## 2022-10-26 PROCEDURE — 3288F PR FALLS RISK ASSESSMENT DOCUMENTED: ICD-10-PCS | Mod: CPTII,S$GLB,, | Performed by: COLON & RECTAL SURGERY

## 2022-10-26 PROCEDURE — 99999 PR PBB SHADOW E&M-EST. PATIENT-LVL III: ICD-10-PCS | Mod: PBBFAC,,, | Performed by: COLON & RECTAL SURGERY

## 2022-10-26 PROCEDURE — 1101F PR PT FALLS ASSESS DOC 0-1 FALLS W/OUT INJ PAST YR: ICD-10-PCS | Mod: CPTII,S$GLB,, | Performed by: COLON & RECTAL SURGERY

## 2022-10-26 PROCEDURE — 3075F PR MOST RECENT SYSTOLIC BLOOD PRESS GE 130-139MM HG: ICD-10-PCS | Mod: CPTII,S$GLB,, | Performed by: COLON & RECTAL SURGERY

## 2022-10-26 PROCEDURE — 1101F PT FALLS ASSESS-DOCD LE1/YR: CPT | Mod: CPTII,S$GLB,, | Performed by: COLON & RECTAL SURGERY

## 2022-10-26 PROCEDURE — 99214 OFFICE O/P EST MOD 30 MIN: CPT | Mod: S$GLB,,, | Performed by: COLON & RECTAL SURGERY

## 2022-10-26 NOTE — PROGRESS NOTES
HPI:  Prosper Soliz is a 87 y.o. male with history of rectal CA     Moving bowels well.  Formed.  Continent.  No pain  Appetite good, no wt loss  Good performance status  Incontinent of urine - wears depends for this.        Past Medical History:   Diagnosis Date    Cancer     GERD without esophagitis     Hiatal hernia 05/18/2021    normal mucose in second part of the duodenum     Hypercholesterolemia     Hypertension     Hypothyroidism     Left groin hernia     Prostate cancer 1999    Rectal cancer 05/2021        Past Surgical History:   Procedure Laterality Date    APPENDECTOMY      COLONOSCOPY W/ BIOPSIES  5.18.21 polyp in hepatic flexure,    ESOPHAGOGASTRODUODENOSCOPY  05/18/2021    POLYPECTOMY  05/21/2021    masss in the proximal rectum and mid rectum ( biopsy)    PROSTATECTOMY  1999       Review of patient's allergies indicates:  No Known Allergies    Family History   Problem Relation Age of Onset    Stroke Mother     Lung cancer Father     Bone cancer Brother     Clotting disorder Brother     Coronary artery disease Brother        Social History     Socioeconomic History    Marital status:    Tobacco Use    Smoking status: Never    Smokeless tobacco: Never   Substance and Sexual Activity    Alcohol use: Never    Drug use: Never    Sexual activity: Not Currently       ROS:  GENERAL: No fever, chills, fatigability or weight loss.  Integument: No rashes, redness, icterus  CHEST: Denies WALKER, cyanosis, wheezing, cough and sputum production.  CARDIOVASCULAR: Denies chest pain, PND, orthopnea or reduced exercise tolerance.  GI: Denies abd pain, dysphagia, nausea, vomiting, no hematemesis   : Denies burning on urination, no hematuria, no bacteriuria  MSK: No deformities, swelling, joint pain swelling  Neurologic: No HAs, seizures, weakness, paresthesias, gait problems    PE:  General appearance well  /64 (BP Location: Left arm, Patient Position: Sitting, BP Method: Large (Automatic))   Pulse 62   Ht  "5' 10" (1.778 m)   Wt 76.8 kg (169 lb 4.8 oz)   BMI 24.29 kg/m²     Sclera/ Skin anicteric  LN none palpable  AT NC EOMI  Neck supple trachea midline   Chest symmetric, nl excursion, no retractions, breathing comfortably  Abdomen  ND soft NT.  No masses, no organomegaly  EXT - no CCE  Neuro:  Mood/ affect nl, alert and oriented x 3, moves all ext's, gait nl      Assessment:  Regrowth of rectal CA following total neoadjuvant therapy and CCR  Good performance status    Plan:  Long discussion with pt and son.  The options of definitve surgical treatment were discussed.  One option other than major resection might be transanal local excision.    He wants to speak with Dr Bar about option of chemoRX  He will RTC prn.        "

## 2022-11-04 ENCOUNTER — TELEPHONE (OUTPATIENT)
Dept: SURGERY | Facility: CLINIC | Age: 87
End: 2022-11-04
Payer: COMMERCIAL

## 2022-11-04 NOTE — TELEPHONE ENCOUNTER
----- Message from Raquel Servin sent at 11/4/2022  1:08 PM CDT -----  Type:  Patient Returning Call    Who Called:pt   Who Left Message for Patient:  Does the patient know what this is regarding?:schedule surgery   Would the patient rather a call back or a response via [facility]? Call back   Best Call Back Number:[phone]  Additional Information: pt states that he's ready to schedule surgery

## 2022-11-04 NOTE — TELEPHONE ENCOUNTER
Pt's son said pt is ready to schedule surgery. Told him I will make an appt for him to see Dr Jensen and Anesthesia to get cleared. He will also need an appt with the stoma nurse. I will make all appts once pre op appt is made

## 2022-11-07 ENCOUNTER — TELEPHONE (OUTPATIENT)
Dept: SURGERY | Facility: CLINIC | Age: 87
End: 2022-11-07
Payer: COMMERCIAL

## 2022-11-07 DIAGNOSIS — C20 RECTAL CANCER: Primary | ICD-10-CM

## 2022-11-07 RX ORDER — GABAPENTIN 300 MG/1
300 CAPSULE ORAL
Status: CANCELLED | OUTPATIENT
Start: 2022-11-07

## 2022-11-07 RX ORDER — ACETAMINOPHEN 650 MG/20.3ML
975 LIQUID ORAL
Status: CANCELLED | OUTPATIENT
Start: 2022-11-07

## 2022-11-07 RX ORDER — SODIUM CHLORIDE 9 MG/ML
INJECTION, SOLUTION INTRAVENOUS
Status: CANCELLED | OUTPATIENT
Start: 2022-11-07

## 2022-11-07 RX ORDER — METRONIDAZOLE 500 MG/100ML
500 INJECTION, SOLUTION INTRAVENOUS
Status: CANCELLED | OUTPATIENT
Start: 2022-11-07

## 2022-11-07 RX ORDER — LIDOCAINE HYDROCHLORIDE 10 MG/ML
1 INJECTION, SOLUTION EPIDURAL; INFILTRATION; INTRACAUDAL; PERINEURAL
Status: CANCELLED | OUTPATIENT
Start: 2022-11-07

## 2022-11-07 RX ORDER — HEPARIN SODIUM 5000 [USP'U]/ML
5000 INJECTION, SOLUTION INTRAVENOUS; SUBCUTANEOUS EVERY 8 HOURS
Status: CANCELLED | OUTPATIENT
Start: 2022-11-07 | End: 2022-11-07

## 2022-11-07 RX ORDER — TRIPROLIDINE/PSEUDOEPHEDRINE 2.5MG-60MG
600 TABLET ORAL
Status: CANCELLED | OUTPATIENT
Start: 2022-11-07

## 2022-11-07 RX ORDER — MUPIROCIN 20 MG/G
1 OINTMENT TOPICAL
Status: CANCELLED | OUTPATIENT
Start: 2022-11-07

## 2022-11-07 NOTE — TELEPHONE ENCOUNTER
Called and spoke with Victor M about pt's pre op appts.Gave him the appt times and told him to go on the portal to get the location. He is trying to get a copy of pt's last EKG faxed to me.. I will let him know if I get it.

## 2022-11-09 RX ORDER — CIPROFLOXACIN 500 MG/1
500 TABLET ORAL 2 TIMES DAILY
Qty: 2 TABLET | Refills: 0 | Status: ON HOLD | OUTPATIENT
Start: 2022-11-09 | End: 2022-12-21 | Stop reason: HOSPADM

## 2022-11-09 RX ORDER — METRONIDAZOLE 500 MG/1
500 TABLET ORAL 2 TIMES DAILY
Qty: 2 TABLET | Refills: 0 | Status: ON HOLD | OUTPATIENT
Start: 2022-11-09 | End: 2022-12-21 | Stop reason: HOSPADM

## 2022-11-09 RX ORDER — POLYETHYLENE GLYCOL 3350, SODIUM SULFATE ANHYDROUS, SODIUM BICARBONATE, SODIUM CHLORIDE, POTASSIUM CHLORIDE 236; 22.74; 6.74; 5.86; 2.97 G/4L; G/4L; G/4L; G/4L; G/4L
4 POWDER, FOR SOLUTION ORAL ONCE
Qty: 1 EACH | Refills: 0 | Status: SHIPPED | OUTPATIENT
Start: 2022-11-09 | End: 2022-11-09

## 2022-11-21 ENCOUNTER — TELEPHONE (OUTPATIENT)
Dept: SURGERY | Facility: CLINIC | Age: 87
End: 2022-11-21
Payer: COMMERCIAL

## 2022-11-21 NOTE — TELEPHONE ENCOUNTER
Spoke with son regarding antibiotics. Instructed that antibiotics are for the night before surgery. Son verbalizes understanding.

## 2022-11-21 NOTE — TELEPHONE ENCOUNTER
----- Message from Bonilla Baker sent at 11/21/2022  1:47 PM CST -----  Contact: Victor M @[phone]  Caller is calling in stating that the pt would like to know if the prescription that was given should be taken now or during the procedure. Please call to discuss further.

## 2022-11-23 ENCOUNTER — HOSPITAL ENCOUNTER (OUTPATIENT)
Dept: PREADMISSION TESTING | Facility: HOSPITAL | Age: 87
Discharge: HOME | End: 2022-11-23
Attending: COLON & RECTAL SURGERY
Payer: COMMERCIAL

## 2022-11-23 ENCOUNTER — OFFICE VISIT (OUTPATIENT)
Dept: WOUND CARE | Facility: CLINIC | Age: 87
End: 2022-11-23
Payer: COMMERCIAL

## 2022-11-23 ENCOUNTER — OFFICE VISIT (OUTPATIENT)
Dept: INTERNAL MEDICINE | Facility: CLINIC | Age: 87
End: 2022-11-23
Payer: COMMERCIAL

## 2022-11-23 ENCOUNTER — ANESTHESIA EVENT (OUTPATIENT)
Dept: SURGERY | Facility: HOSPITAL | Age: 87
DRG: 330 | End: 2022-11-23
Payer: MEDICARE

## 2022-11-23 ENCOUNTER — OFFICE VISIT (OUTPATIENT)
Dept: SURGERY | Facility: CLINIC | Age: 87
End: 2022-11-23
Payer: COMMERCIAL

## 2022-11-23 VITALS
WEIGHT: 170 LBS | OXYGEN SATURATION: 98 % | SYSTOLIC BLOOD PRESSURE: 151 MMHG | TEMPERATURE: 98 F | HEART RATE: 62 BPM | DIASTOLIC BLOOD PRESSURE: 76 MMHG | RESPIRATION RATE: 18 BRPM | BODY MASS INDEX: 24.34 KG/M2 | HEIGHT: 70 IN

## 2022-11-23 VITALS
BODY MASS INDEX: 23.88 KG/M2 | WEIGHT: 166.44 LBS | OXYGEN SATURATION: 98 % | DIASTOLIC BLOOD PRESSURE: 67 MMHG | RESPIRATION RATE: 20 BRPM | HEART RATE: 61 BPM | SYSTOLIC BLOOD PRESSURE: 150 MMHG

## 2022-11-23 DIAGNOSIS — K44.9 HIATAL HERNIA: ICD-10-CM

## 2022-11-23 DIAGNOSIS — G47.00 INSOMNIA, UNSPECIFIED TYPE: ICD-10-CM

## 2022-11-23 DIAGNOSIS — Z43.3 ATTENTION TO COLOSTOMY: ICD-10-CM

## 2022-11-23 DIAGNOSIS — R63.4 WEIGHT LOSS: ICD-10-CM

## 2022-11-23 DIAGNOSIS — Z71.89 ENCOUNTER FOR OSTOMY CARE EDUCATION: Primary | ICD-10-CM

## 2022-11-23 DIAGNOSIS — Z86.16 HISTORY OF COVID-19: ICD-10-CM

## 2022-11-23 DIAGNOSIS — R60.9 EDEMA, UNSPECIFIED TYPE: ICD-10-CM

## 2022-11-23 DIAGNOSIS — H91.93 BILATERAL HEARING LOSS, UNSPECIFIED HEARING LOSS TYPE: ICD-10-CM

## 2022-11-23 DIAGNOSIS — I10 PRIMARY HYPERTENSION: ICD-10-CM

## 2022-11-23 DIAGNOSIS — G62.9 NEUROPATHY: ICD-10-CM

## 2022-11-23 DIAGNOSIS — C20 RECTAL CANCER: Primary | ICD-10-CM

## 2022-11-23 DIAGNOSIS — Z85.46 HISTORY OF PROSTATE CANCER: ICD-10-CM

## 2022-11-23 DIAGNOSIS — D64.9 ANEMIA, UNSPECIFIED TYPE: ICD-10-CM

## 2022-11-23 DIAGNOSIS — C20 RECTAL CANCER: ICD-10-CM

## 2022-11-23 DIAGNOSIS — Z86.39 HISTORY OF HYPOTHYROIDISM: ICD-10-CM

## 2022-11-23 DIAGNOSIS — Z01.818 PREOP EXAMINATION: Primary | ICD-10-CM

## 2022-11-23 PROCEDURE — 99999 PR PBB SHADOW E&M-EST. PATIENT-LVL III: CPT | Mod: PBBFAC,,, | Performed by: CLINICAL NURSE SPECIALIST

## 2022-11-23 PROCEDURE — 99999 PR PBB SHADOW E&M-EST. PATIENT-LVL III: ICD-10-PCS | Mod: PBBFAC,,, | Performed by: CLINICAL NURSE SPECIALIST

## 2022-11-23 PROCEDURE — 1159F PR MEDICATION LIST DOCUMENTED IN MEDICAL RECORD: ICD-10-PCS | Mod: CPTII,S$GLB,, | Performed by: HOSPITALIST

## 2022-11-23 PROCEDURE — 99999 PR PBB SHADOW E&M-EST. PATIENT-LVL II: CPT | Mod: PBBFAC,,, | Performed by: HOSPITALIST

## 2022-11-23 PROCEDURE — 99024 PR POST-OP FOLLOW-UP VISIT: ICD-10-PCS | Mod: S$GLB,,, | Performed by: CLINICAL NURSE SPECIALIST

## 2022-11-23 PROCEDURE — 1126F PR PAIN SEVERITY QUANTIFIED, NO PAIN PRESENT: ICD-10-PCS | Mod: CPTII,S$GLB,, | Performed by: COLON & RECTAL SURGERY

## 2022-11-23 PROCEDURE — 3077F PR MOST RECENT SYSTOLIC BLOOD PRESSURE >= 140 MM HG: ICD-10-PCS | Mod: CPTII,S$GLB,, | Performed by: COLON & RECTAL SURGERY

## 2022-11-23 PROCEDURE — 1159F MED LIST DOCD IN RCRD: CPT | Mod: CPTII,S$GLB,, | Performed by: CLINICAL NURSE SPECIALIST

## 2022-11-23 PROCEDURE — 1101F PR PT FALLS ASSESS DOC 0-1 FALLS W/OUT INJ PAST YR: ICD-10-PCS | Mod: CPTII,S$GLB,, | Performed by: COLON & RECTAL SURGERY

## 2022-11-23 PROCEDURE — 99204 OFFICE O/P NEW MOD 45 MIN: CPT | Mod: S$GLB,,, | Performed by: HOSPITALIST

## 2022-11-23 PROCEDURE — 1101F PT FALLS ASSESS-DOCD LE1/YR: CPT | Mod: CPTII,S$GLB,, | Performed by: COLON & RECTAL SURGERY

## 2022-11-23 PROCEDURE — 99204 PR OFFICE/OUTPT VISIT, NEW, LEVL IV, 45-59 MIN: ICD-10-PCS | Mod: S$GLB,,, | Performed by: HOSPITALIST

## 2022-11-23 PROCEDURE — 1160F PR REVIEW ALL MEDS BY PRESCRIBER/CLIN PHARMACIST DOCUMENTED: ICD-10-PCS | Mod: CPTII,S$GLB,, | Performed by: CLINICAL NURSE SPECIALIST

## 2022-11-23 PROCEDURE — 1160F RVW MEDS BY RX/DR IN RCRD: CPT | Mod: CPTII,S$GLB,, | Performed by: HOSPITALIST

## 2022-11-23 PROCEDURE — 3078F PR MOST RECENT DIASTOLIC BLOOD PRESSURE < 80 MM HG: ICD-10-PCS | Mod: CPTII,S$GLB,, | Performed by: COLON & RECTAL SURGERY

## 2022-11-23 PROCEDURE — 3077F SYST BP >= 140 MM HG: CPT | Mod: CPTII,S$GLB,, | Performed by: COLON & RECTAL SURGERY

## 2022-11-23 PROCEDURE — 1157F ADVNC CARE PLAN IN RCRD: CPT | Mod: CPTII,S$GLB,, | Performed by: COLON & RECTAL SURGERY

## 2022-11-23 PROCEDURE — 1160F PR REVIEW ALL MEDS BY PRESCRIBER/CLIN PHARMACIST DOCUMENTED: ICD-10-PCS | Mod: CPTII,S$GLB,, | Performed by: HOSPITALIST

## 2022-11-23 PROCEDURE — 99999 PR PBB SHADOW E&M-EST. PATIENT-LVL III: ICD-10-PCS | Mod: PBBFAC,,, | Performed by: COLON & RECTAL SURGERY

## 2022-11-23 PROCEDURE — 3288F FALL RISK ASSESSMENT DOCD: CPT | Mod: CPTII,S$GLB,, | Performed by: COLON & RECTAL SURGERY

## 2022-11-23 PROCEDURE — 1159F MED LIST DOCD IN RCRD: CPT | Mod: CPTII,S$GLB,, | Performed by: HOSPITALIST

## 2022-11-23 PROCEDURE — 99214 PR OFFICE/OUTPT VISIT, EST, LEVL IV, 30-39 MIN: ICD-10-PCS | Mod: S$GLB,,, | Performed by: COLON & RECTAL SURGERY

## 2022-11-23 PROCEDURE — 1160F RVW MEDS BY RX/DR IN RCRD: CPT | Mod: CPTII,S$GLB,, | Performed by: CLINICAL NURSE SPECIALIST

## 2022-11-23 PROCEDURE — 1159F PR MEDICATION LIST DOCUMENTED IN MEDICAL RECORD: ICD-10-PCS | Mod: CPTII,S$GLB,, | Performed by: CLINICAL NURSE SPECIALIST

## 2022-11-23 PROCEDURE — 1157F PR ADVANCE CARE PLAN OR EQUIV PRESENT IN MEDICAL RECORD: ICD-10-PCS | Mod: CPTII,S$GLB,, | Performed by: COLON & RECTAL SURGERY

## 2022-11-23 PROCEDURE — 99024 POSTOP FOLLOW-UP VISIT: CPT | Mod: S$GLB,,, | Performed by: CLINICAL NURSE SPECIALIST

## 2022-11-23 PROCEDURE — 3078F DIAST BP <80 MM HG: CPT | Mod: CPTII,S$GLB,, | Performed by: COLON & RECTAL SURGERY

## 2022-11-23 PROCEDURE — 99214 OFFICE O/P EST MOD 30 MIN: CPT | Mod: S$GLB,,, | Performed by: COLON & RECTAL SURGERY

## 2022-11-23 PROCEDURE — 3288F PR FALLS RISK ASSESSMENT DOCUMENTED: ICD-10-PCS | Mod: CPTII,S$GLB,, | Performed by: COLON & RECTAL SURGERY

## 2022-11-23 PROCEDURE — 99999 PR PBB SHADOW E&M-EST. PATIENT-LVL III: CPT | Mod: PBBFAC,,, | Performed by: COLON & RECTAL SURGERY

## 2022-11-23 PROCEDURE — 99999 PR PBB SHADOW E&M-EST. PATIENT-LVL II: ICD-10-PCS | Mod: PBBFAC,,, | Performed by: HOSPITALIST

## 2022-11-23 PROCEDURE — 1126F AMNT PAIN NOTED NONE PRSNT: CPT | Mod: CPTII,S$GLB,, | Performed by: COLON & RECTAL SURGERY

## 2022-11-23 RX ORDER — METRONIDAZOLE 500 MG/1
500 TABLET ORAL 2 TIMES DAILY
Qty: 2 TABLET | Refills: 0 | Status: SHIPPED | OUTPATIENT
Start: 2022-11-23 | End: 2022-11-24

## 2022-11-23 RX ORDER — CIPROFLOXACIN 500 MG/1
500 TABLET ORAL 2 TIMES DAILY
Qty: 2 TABLET | Refills: 0 | Status: SHIPPED | OUTPATIENT
Start: 2022-11-23 | End: 2022-11-24

## 2022-11-23 NOTE — PROGRESS NOTES
[facility]  Progress Note    Patient Name: Prosper Soliz  MRN: [id number]  Date of Evaluation- 11/23/2022  PCP- Julien Stevenson NP    Future cases for Prosper Soliz [[id number]]       Case ID Status Date Time [place] Procedure Provider Location    [id number] [facility] 12/15/2022  1:30  XI ROBOTIC, PROCTECTOMY H. Robert Jensen MD [[id number]] [facility] OR 2ND FLR            HPI:  History of present illness- I had the pleasure of meeting this pleasant 87 y.o. gentleman in the pre op clinic prior to his elective Abdominal surgery. The patient is new to me . Prosper was accompanied by son Victor M .    I have obtained the history by speaking to the patient and by reviewing the electronic health records.    Events leading up to surgery / History of presenting illness -    Rectal cancer  I have obtained the history by speaking to the patient and the caring son who was in the clinic   His problem started in May of 2021 when he noticed blood on the toilet paper when he was cleaning his anal area   He had colonoscopy done for that and was found to have rectal cancer  Received radiation treatment 5 sessions at [facility] and had diarrhea towards the end of the radiation   Received chemotherapy after that until the end of November 2021   He did well with chemotherapy except that he lost weight and has neuropathy on his hands and feels and lost the sense of taste and smell     He did well with a rectal cancer treatment but had recurrence of rectal cancer mid 2022   After discussing with the doctors and his family members a decision was made to proceed with surgery on December 15th  Per his understanding there is a possibility that he might need chemotherapy after surgery   He has right upper chest port    He had not had anymore rectal bleeding since he had radiation treatment  He has no pain from this cancer and he no longer has diarrhea    He has urinary incontinence that is suggestive of stress like when he bends forward  He has had  urinary incontinence after he had prostate surgery done in the past  His urinary incontinence reoccurred after having radiation treatment for rectal cancer  He has no pain urinating    As per chart review-      Rectal cancer diagnosed on a colonoscopy done 18 May 2021     locally advanced rectal adenocarcinoma status post total neoadjuvant RT followed by chemotherapy - diagnosed after noting increasing stool frequency with fecal urgency as well as BRBPR  Patient had significant diarrhea which has led to treatment initiation with FOLFOX on 07/07/2021.  Cycle 3 chemotherapy delayed by 2 weeks due to significant thrombocytopenia.  Bone marrow remains sensitive to chemotherapy.  Dose reduced oxaliplatin by 20% with cycle 3 to stay on track with proposed neoadjuvant chemotherapy and completed cycle 9 on 11/26/2021.     Relevant health conditions of significance for the perioperative period/ History of presenting illness -    Subjectively describes health as great, except his rectal cancer    Despite his advanced age he stays active and cuts his grass with a push lawnmower     He lives by himself and is very functional  He lost his wife in 2006       He retired in 1996 after working for 40 years as a    He lives in a single-level house with very helpful neighbor  His neighbor is a nurse  He has 2 sons  One son lives about 40 miles away and the 2nd son is in [place]  He is going to recover with the son closer to him  The son he is going to recover with lives in a single-level house    Health conditions of significance for the perioperative period      - HTN  - Insomnia  - neuropathy  - weight loss  - hearing impairment     He is under cardiology care for blood pressure and he attends cardiology followups every 6 months  He is not known to have diabetes      Subjective/ Objective:     Chief complaint-Preoperative evaluation, Perioperative Medical management, complication reduction plan     Active cardiac  conditions- none    Revised cardiac risk index predictors- none    Functional capacity -Examples of physical activity  house work, can take 1 flight of stairs, and cutting the grass with a mower, he took a flight of stairs today at [facility]----- He can undertake all the above activities without  chest pain,chest tightness, Shortness of breath ,dizziness,lightheadedness making his exercise tolerance more,   than 4 Mets.       Review of Systems   Constitutional:  Negative for chills and fever.        No unusual weight changes     HENT:          STOPBANG score  4/ 8      Elevated BP  Age over 50 years  Neck size over 40 CM  Male gender   Eyes:         No unusual vision changes   Respiratory:          No cough , phlegm    No Hemoptysis   Cardiovascular:         As noted   Gastrointestinal:         Bowels- Regular No overt GI/ blood losses   Endocrine:        Prednisone use > 20 mg daily for 3 weeks- None   Genitourinary:  Negative for dysuria.        No urinary hesitancy    Musculoskeletal:           No unusual muscle/ joint pains   Skin:  Negative for rash.   Neurological:  Negative for syncope.        No unilateral weakness   Hematological:         Current use of Anticoagulants  None   Psychiatric/Behavioral:          No Depression,Anxiety   No vascular stenting    No past medical history pertinent negatives.        No anesthesia, bleeding, cardiac, PONV  problems with previous surgeries/procedures.  Medications and Allergies reviewed in epic.     FH- No anesthesia,bleeding, problems in family      Physical Exam      Vitals - 1 value per visit 11/23/2022   SYSTOLIC 151   DIASTOLIC 76   Pulse 62   Temp 98.2   Resp 18   SPO2 98   Weight (lb) 170   Weight (kg) 77.111   Height 70   BMI (Calculated) 24.4       Physical Exam  Constitutional-   General appearance-Conscious,Coherent  Eyes- No conjunctival icterus,pupils  round  and reactive to light   ENT-Oral cavity- moist  and  upper denture   , Hearing grossly normal    Neck- No thyromegaly ,Trachea -central, No jugular venous distension,   No Carotid Bruit   Cardiovascular -Heart Sounds- Normal ,  no murmur , and  occasional irregularity suggestive of ectopy   , No gallop rhythm   Respiratory - Normal Respiratory Effort, Normal breath sounds, crepitations bases,  no wheeze , and  no forced expiratory wheeze    Peripheral pitting pedal edema-- mild, no calf pain   Gastrointestinal -Soft abdomen, No palpable masses, Non Tender,Liver,Spleen not palpable. No-- free fluid and shifting dullness  Musculoskeletal- No finger Clubbing. Strength grossly normal   Lymphatic-No Palpable cervical, axillary,Inguinal lymphadenopathy   Psychiatric - normal effect,Orientation  Rt Dorsalis pedis pulses-palpable    Lt Dorsalis pedis pulses- palpable   Rt Posterior tibial pulses -palpable   Left posterior tibial pulses -palpable   Miscellaneous -  Surgical scarLower abdomen  ,  no renal bruit,  bowel sounds positive , and  Tattoo     Investigations  Lab and Imaging have been reviewed in [facility].    Review of Medicine tests    EKG- I had independently reviewed the EKG from--7/20/2021  It was reported to be showing     Normal sinus rhythm   Normal ECG   No previous ECGs available    Review of clinical lab tests:  Lab Results   Component Value Date    CREATININE 0.91 10/24/2022    HGB 13.0 (L) 10/24/2022     10/24/2022            Review of old records- Was done and information gathered regards to events leading to surgery and health conditions of significance in the perioperative period.      Preoperative cardiac risk assessment-  The patient does not have any active cardiac conditions . Revised cardiac risk index predictors- 0---.Functional capacity is more than 4 Mets. He will be undergoing a Abdominal procedure that carries a Moderate Risk risk     Risk of a major Cardiac event ( Defined as death, myocardial infarction, or cardiac arrest at 30 days after noncardiac surgery), based on RCRI score      -3.9%       No further cardiac work up is indicated prior to proceeding with the surgery          American Society of Anesthesiologists Physical status classification ( ASA ) class: 3     Postoperative pulmonary complication risk assessment:         Sima Respiratory failure index- percentage risk of respiratory failure: 0.5 %     Assessment/Plan:     Rectal cancer  He is planning to have surgery on December 15, 2022    Primary hypertension  He is on 3 blood pressure medication namely amlodipine olmesartan and metoprolol    Home BP readings -121/70 - Heart rate 58-60, Sat 97 %   He does not have any dizziness or lightheaded  Recent BP readings in the record-  Vitals - 1 value per visit 10/28/2022 11/21/2022 11/21/2022 11/23/2022   SYSTOLIC 135  149 149   DIASTOLIC 63  66 74     Vitals - 1 value per visit 11/23/2022   SYSTOLIC 151   DIASTOLIC 76     He has compared his blood pressure machine at home with the doctors and the readings correlated  Hypertension-  Blood pressure is acceptable . I suggest continuation of -Olmeartan during the entire perioperative period. I suggest holding Amlodipine , Metoprolol - on the morning of the surgery and can continue that  post operatively under blood pressure, electrolyte and renal function monitoring as long as they are acceptable.I suggest addressing pain control as uncontrolled pain can increased blood pressure     Insomnia  On Ambien   Has trouble falling asleep   No confusion with it       Weight loss  Lost 40 # going through chemo and radiation therapy last year  He has gained weight since completion of chemo and radiation and his weight has been stable at around 170 lb which is 10 lb lower than his usual weight  He can not taste and smell food since he had chemotherapy but he is eating food  He is eating 3 meals a day  He reports having good appetite    Nutriscore   Questions  -Have you lost weight unintentionally in the last 3 months - No - 0  - Have you been  eating poorly in the last 2 weeks , due to decreased appetite - No- 0  Location of neoplasm-pelvis - 1    I suggested eating nutritious food    Frailty score     > 10 # Unintentional weight loss over 1 year - None  Decreased  strength - None   Exhaustion - None  Low Physical activity - None  Slowed walking speed - none    He is currently not friend          Neuropathy  He has tingling and muscle the hands and feet   He feels somewhat unstable on his feet due to neuropathy on his feet but he has not fallen   No reported open areas on the feet   I suggested feet care  He wears socks in his house when he moves and he wears shoes when he goes out    Bilateral hearing loss  He currently does not wear hearing aids   I have noticed that he is able to hear if spoken slowly  He has his family planning on staying with him in the hospital    He is at increased risk of confusion if he is not able to hear well    Hiatal hernia  He does not seem to be trouble much with acid reflux  Symptoms- Belching   Walking relieves      Does not sound Cardiac   Tips to control reflux discussed     He does not seem to be trouble much with acid reflux    . I suggest aspiration precautions    History of hypothyroidism  Was on Levothyroxine  Off for long time   Gets 6 monthly checks with Cardiologist  Currently he does not have any clinical suggestions of either overactive or underactive thyroid symptoms  To his understanding has no longer has thyroid problems        History of prostate cancer  He had prostatectomy January of 1999 and has no recurrence of cancer did not require radiation treatment    Anemia  His hemoglobin being mildly low could be from his cancer and the cancer treatment that he has had       History of COVID-19  10/18/2022  He did not require hospitalization, supplemental oxygen    COVID screening     No fever   No cough   No SOB  No sore throat   No muscle aches   No nausea, vomiting , diarrhea -    Edema    Edema- I  suggested avoidance of added salt,avoidance of NSAID's, unless advised or ordered  and suggested Limb elevation and domingo hose use        Preventive perioperative care    Thromboembolic prophylaxis:  His risk factors for thrombosis include cancer surgical procedure and age.I suggest  thromboembolic prophylaxis ( mechanical/pharmacological, weighing the risk benefits of pharmacological agent use considering hemal procedural bleeding )  during the perioperative period.I suggested being active in the post operative period.      Postoperative pulmonary complication prophylaxis-Risk factors for post operative pulmonary complications include age over 65 years, ASA class >2, and proximity of the surgical site to the lungs- I suggest incentive spirometry use, early ambulation, end tidal carbon dioxide monitoring, and pain control so as to avoid diaphragmatic splinting  , oral care , head end of bed elevation      Renal complication prophylaxis-Risk factors for renal complications include hypertension . I suggest keeping him well hydrated  in the perioperative period.     Surgical site Infection Prophylaxis-I  suggest appropriate antibiotic for Prophylaxis against Surgical site infections  No reported Staph infection  Skin antibacterial discussed       Delirium prophylaxis-Risk factors - Advanced Age - I suggest avoidance / minimizing the use of  Benzodiazepines ( unless the patient has been taking it on a regular basis ),Anticholinergic medication,Antihistamines ( like  Benadryl).I suggest minimizing the use of opioid medication and use of IV tylenol,if it is appropriate. I suggest using the lowest possible dose of opioids for the shortest duration possible in the perioperative period. I suggest to Keep shades/blinds open during the day, lights off and shades closed at night to encourage normal sleep/wake cycle.I encourage the presence of the family member with the patient at all times, if at all possible as mental status  changes can be picked up early by the family members and they help with reorientation. I encouraged the presence of family to help with orientation in the perioperative period. Benadryl avoidance suggested      In view of rectal surgery the patient  is at risk of postoperative urinary retention.  I suggest avoidance / minimizing the of  Benzodiazepines,Anticholinergic medication,antihistamines ( Benadryl) , if possible in the perioperative period. I suggest using the minimum possible use of opioids for the minimum period of time in the perioperative period. Benadryl avoidance suggested      This visit was focused on Preoperative evaluation, Perioperative Medical management, complication reduction plans. I suggest that the patient follows up with primary care or relevant sub specialists for ongoing health care.    I appreciate the opportunity to be involved in this patients care. Please feel free to contact me if there were any questions about this consultation.    Patient is optimized    Patient/ care giver/ Family member was instructed to call and update me about any changes to health,  medication, office visits ,testing out side of the hemal operative care center , hospitalizations between now and surgery      Checked for over-the-counter medication      Brittany John MD  Internal Medicine  [facility]   Cell Phone- [phone]    History of COVID - Yes   COVID vaccination status -3  --  11/23/2022- 15 36     Cardiology records reviewed             Echo from Sept 2022        EKG June 2022    Sinus rhythm - no acute changes    Spoke to his Son   No overt hypothyroid symptoms   He did not tolerate Thyroid Medication in the past - off for 2 years - had abdominal distension  in the middle   I am inclined not to push thyroid ion him given that he did not tolerate it particularly since he does not have Overt Hypothyroid symptoms   Suggested working with Primary Care provider          No recurrence  of A fib  --  12/7/2022- 15 01     Called and spoke to him   Started Thyroid - Levothyroxine 50 Microgram- once a day - for the past 2 weeks - was off for 1 year prior  He feels good   No weight gain like he did in the past with Levothyroxine   No chest pain, SOB  He has cut his grass yesterday with a push lawnmower-No chest pain, SOB  No other changes to medication , health  Suggested spacing Thyroxine replacement with food, other Medication    Hypothyroidism- I suggest continuation of synthroid replacement in the perioperative period     Discussed stay at [facility] but he preferred not to  --  12/14/2022- 14 48     Called to follow up , to address any concerns with the up coming surgery or any questions on Medication instructions   Unable to speak   Left a message to call, if needed , if has any concerns with the up coming surgery or any questions on Medication instructions , if had changes to medication or health , since my evaluation

## 2022-11-23 NOTE — PROGRESS NOTES
Pre op Ostomy marking:    This patient was seen today per request  in preparation for upcoming ostomy surgery on 12/15/22  Explained procedure for stoma siting and rationale. Simulated appliance use with empty pouch provided. Abdomen examined with patient sitting, standing and lying down. Observed abdomen, contours, location of belt line, with in visual field and rectus muscle palpation considered.  Stoma marking/siting was performed per protocol and patient marked with a permanent marker and skin staining with silver nitrate. Explained that final decision for stomal placement is up to surgeon's discretion.       The proposed surgery was discussed and patient educated on expected postoperative course and expectations. The patient was allowed to ask questions and was also given pre-op information kit from  the [employer] for review at home prior to surgery.

## 2022-11-23 NOTE — ASSESSMENT & PLAN NOTE
He is on 3 blood pressure medication namely amlodipine olmesartan and metoprolol    Home BP readings -121/70 - Heart rate 58-60, Sat 97 %   He does not have any dizziness or lightheaded  Recent BP readings in the record-  Vitals - 1 value per visit 10/28/2022 11/21/2022 11/21/2022 11/23/2022   SYSTOLIC 135  149 149   DIASTOLIC 63  66 74     Vitals - 1 value per visit 11/23/2022   SYSTOLIC 151   DIASTOLIC 76     He has compared his blood pressure machine at home with the doctors and the readings correlated  Hypertension-  Blood pressure is acceptable . I suggest continuation of -Olmeartan during the entire perioperative period. I suggest holding Amlodipine , Metoprolol - on the morning of the surgery and can continue that  post operatively under blood pressure, electrolyte and renal function monitoring as long as they are acceptable.I suggest addressing pain control as uncontrolled pain can increased blood pressure

## 2022-11-23 NOTE — ASSESSMENT & PLAN NOTE
He does not seem to be trouble much with acid reflux  Symptoms- Belching   Walking relieves      Does not sound Cardiac   Tips to control reflux discussed     He does not seem to be trouble much with acid reflux    . I suggest aspiration precautions

## 2022-11-23 NOTE — ASSESSMENT & PLAN NOTE
He had prostatectomy January of 1999 and has no recurrence of cancer did not require radiation treatment

## 2022-11-23 NOTE — PROGRESS NOTES
HPI:  Prosper Soliz is a 87 y.o. male with history of low rectal cancer, locally invasive     Now with regrowth of rectal cancer during surveillance in watch and wait program  Denies pain.  Bleeding with BM.  Good appetite and activity.  No wt loss.      Past Medical History:   Diagnosis Date    Cancer     GERD without esophagitis     Hiatal hernia 05/18/2021    normal mucose in second part of the duodenum     Hypercholesterolemia     Hypertension     Hypothyroidism     Left groin hernia     Prostate cancer 1999    Rectal cancer 05/2021        Past Surgical History:   Procedure Laterality Date    APPENDECTOMY      COLONOSCOPY W/ BIOPSIES  5.18.21 polyp in hepatic flexure,    ESOPHAGOGASTRODUODENOSCOPY  05/18/2021    POLYPECTOMY  05/21/2021    masss in the proximal rectum and mid rectum ( biopsy)    PROSTATECTOMY  1999       Review of patient's allergies indicates:  No Known Allergies    Family History   Problem Relation Age of Onset    Heart disease Mother     Stroke Mother     Lung cancer Father     Bone cancer Brother     Coronary artery disease Brother        Social History     Socioeconomic History    Marital status:    Tobacco Use    Smoking status: Never    Smokeless tobacco: Never   Substance and Sexual Activity    Alcohol use: Not Currently    Drug use: Never    Sexual activity: Not Currently       ROS:  GENERAL: No fever, chills, fatigability or weight loss.  Integument: No rashes, redness, icterus  CHEST: Denies WALKER, cyanosis, wheezing, cough and sputum production.  CARDIOVASCULAR: Denies chest pain, PND, orthopnea or reduced exercise tolerance.  GI: Denies abd pain, dysphagia, nausea, vomiting, no hematemesis   : Denies burning on urination, no hematuria, no bacteriuria  MSK: No deformities, swelling, joint pain swelling  Neurologic: No HAs, seizures, weakness, paresthesias, gait problems    PE:  General appearance well  Sclera/ Skin anicteric  LN none palpable  AT NC EOMI  Neck supple trachea  midline   Chest symmetric, nl excursion, no retractions, breathing comfortably  Abdomen  ND soft NT.  no masses, no organomegaly  EXT - no CCE  Neuro:  Mood/ affect nl, alert and oriented x 3, moves all ext's, gait nl        Assessment:  LARC , low to mid rectum, with regrowth following initial complete clinical response to HAYDEE  Good functional status    Plan:  Discussed with pt and son Victor M.  I recommend APR given low lying tumor  The details of surgery, permanence of stoma, expected recuperation and potential risks were discussed explicitly.

## 2022-11-23 NOTE — HPI
History of present illness- I had the pleasure of meeting this pleasant 87 y.o. gentleman in the pre op clinic prior to his elective Abdominal surgery. The patient is new to me . Prosper was accompanied by son iVctor M .    I have obtained the history by speaking to the patient and by reviewing the electronic health records.    Events leading up to surgery / History of presenting illness -    Rectal cancer  I have obtained the history by speaking to the patient and the caring son who was in the clinic   His problem started in May of 2021 when he noticed blood on the toilet paper when he was cleaning his anal area   He had colonoscopy done for that and was found to have rectal cancer  Received radiation treatment 5 sessions at [facility] and had diarrhea towards the end of the radiation   Received chemotherapy after that until the end of November 2021   He did well with chemotherapy except that he lost weight and has neuropathy on his hands and feels and lost the sense of taste and smell     He did well with a rectal cancer treatment but had recurrence of rectal cancer mid 2022   After discussing with the doctors and his family members a decision was made to proceed with surgery on December 15th  Per his understanding there is a possibility that he might need chemotherapy after surgery   He has right upper chest port    He had not had anymore rectal bleeding since he had radiation treatment  He has no pain from this cancer and he no longer has diarrhea    He has urinary incontinence that is suggestive of stress like when he bends forward  He has had urinary incontinence after he had prostate surgery done in the past  His urinary incontinence reoccurred after having radiation treatment for rectal cancer  He has no pain urinating    As per chart review-      Rectal cancer diagnosed on a colonoscopy done 18 May 2021     locally advanced rectal adenocarcinoma status post total neoadjuvant RT followed by chemotherapy - diagnosed  after noting increasing stool frequency with fecal urgency as well as BRBPR  Patient had significant diarrhea which has led to treatment initiation with FOLFOX on 07/07/2021.  Cycle 3 chemotherapy delayed by 2 weeks due to significant thrombocytopenia.  Bone marrow remains sensitive to chemotherapy.  Dose reduced oxaliplatin by 20% with cycle 3 to stay on track with proposed neoadjuvant chemotherapy and completed cycle 9 on 11/26/2021.     Relevant health conditions of significance for the perioperative period/ History of presenting illness -    Subjectively describes health as great, except his rectal cancer    Despite his advanced age he stays active and cuts his grass with a push lawnmower     He lives by himself and is very functional  He lost his wife in 2006       He retired in 1996 after working for 40 years as a    He lives in a single-level house with very helpful neighbor  His neighbor is a nurse  He has 2 sons  One son lives about 40 miles away and the 2nd son is in [place]  He is going to recover with the son closer to him  The son he is going to recover with lives in a single-level house    Health conditions of significance for the perioperative period      - HTN  - Insomnia  - neuropathy  - weight loss  - hearing impairment     He is under cardiology care for blood pressure and he attends cardiology followups every 6 months  He is not known to have diabetes

## 2022-11-23 NOTE — ASSESSMENT & PLAN NOTE
Edema- I suggested avoidance of added salt,avoidance of NSAID's, unless advised or ordered  and suggested Limb elevation and domingo hose use

## 2022-11-23 NOTE — ASSESSMENT & PLAN NOTE
Was on Levothyroxine  Off for long time   Gets 6 monthly checks with Cardiologist  Currently he does not have any clinical suggestions of either overactive or underactive thyroid symptoms  To his understanding has no longer has thyroid problems

## 2022-11-23 NOTE — ASSESSMENT & PLAN NOTE
He has tingling and muscle the hands and feet   He feels somewhat unstable on his feet due to neuropathy on his feet but he has not fallen   No reported open areas on the feet   I suggested feet care  He wears socks in his house when he moves and he wears shoes when he goes out

## 2022-11-23 NOTE — ASSESSMENT & PLAN NOTE
10/18/2022  He did not require hospitalization, supplemental oxygen    COVID screening     No fever   No cough   No SOB  No sore throat   No muscle aches   No nausea, vomiting , diarrhea -

## 2022-11-23 NOTE — ASSESSMENT & PLAN NOTE
He currently does not wear hearing aids   I have noticed that he is able to hear if spoken slowly  He has his family planning on staying with him in the hospital    He is at increased risk of confusion if he is not able to hear well

## 2022-11-23 NOTE — OUTPATIENT SUBJECTIVE & OBJECTIVE
Outpatient Subjective & Objective     Chief complaint-Preoperative evaluation, Perioperative Medical management, complication reduction plan     Active cardiac conditions- none    Revised cardiac risk index predictors- none    Functional capacity -Examples of physical activity  house work, can take 1 flight of stairs, and cutting the grass with a mower, he took a flight of stairs today at [facility]----- He can undertake all the above activities without  chest pain,chest tightness, Shortness of breath ,dizziness,lightheadedness making his exercise tolerance more,   than 4 Mets.       Review of Systems   Constitutional:  Negative for chills and fever.        No unusual weight changes     HENT:          STOPBANG score  4/ 8      Elevated BP  Age over 50 years  Neck size over 40 CM  Male gender   Eyes:         No unusual vision changes   Respiratory:          No cough , phlegm    No Hemoptysis   Cardiovascular:         As noted   Gastrointestinal:         Bowels- Regular No overt GI/ blood losses   Endocrine:        Prednisone use > 20 mg daily for 3 weeks- None   Genitourinary:  Negative for dysuria.        No urinary hesitancy    Musculoskeletal:           No unusual muscle/ joint pains   Skin:  Negative for rash.   Neurological:  Negative for syncope.        No unilateral weakness   Hematological:         Current use of Anticoagulants  None   Psychiatric/Behavioral:          No Depression,Anxiety   No vascular stenting    No past medical history pertinent negatives.        No anesthesia, bleeding, cardiac, PONV  problems with previous surgeries/procedures.  Medications and Allergies reviewed in epic.     FH- No anesthesia,bleeding, problems in family      Physical Exam      Vitals - 1 value per visit 11/23/2022   SYSTOLIC 151   DIASTOLIC 76   Pulse 62   Temp 98.2   Resp 18   SPO2 98   Weight (lb) 170   Weight (kg) 77.111   Height 70   BMI (Calculated) 24.4       Physical Exam  Constitutional-   General  appearance-Conscious,Coherent  Eyes- No conjunctival icterus,pupils  round  and reactive to light   ENT-Oral cavity- moist  and  upper denture   , Hearing grossly normal   Neck- No thyromegaly ,Trachea -central, No jugular venous distension,   No Carotid Bruit   Cardiovascular -Heart Sounds- Normal ,  no murmur , and  occasional irregularity suggestive of ectopy   , No gallop rhythm   Respiratory - Normal Respiratory Effort, Normal breath sounds, crepitations bases,  no wheeze , and  no forced expiratory wheeze    Peripheral pitting pedal edema-- mild, no calf pain   Gastrointestinal -Soft abdomen, No palpable masses, Non Tender,Liver,Spleen not palpable. No-- free fluid and shifting dullness  Musculoskeletal- No finger Clubbing. Strength grossly normal   Lymphatic-No Palpable cervical, axillary,Inguinal lymphadenopathy   Psychiatric - normal effect,Orientation  Rt Dorsalis pedis pulses-palpable    Lt Dorsalis pedis pulses- palpable   Rt Posterior tibial pulses -palpable   Left posterior tibial pulses -palpable   Miscellaneous -  Surgical scarLower abdomen  ,  no renal bruit,  bowel sounds positive , and  Tattoo     Investigations  Lab and Imaging have been reviewed in [facility].    Review of Medicine tests    EKG- I had independently reviewed the EKG from--7/20/2021  It was reported to be showing     Normal sinus rhythm   Normal ECG   No previous ECGs available    Review of clinical lab tests:  Lab Results   Component Value Date    CREATININE 0.91 10/24/2022    HGB 13.0 (L) 10/24/2022     10/24/2022            Review of old records- Was done and information gathered regards to events leading to surgery and health conditions of significance in the perioperative period.    Outpatient Subjective & Objective

## 2022-11-23 NOTE — ASSESSMENT & PLAN NOTE
Lost 40 # going through chemo and radiation therapy last year  He has gained weight since completion of chemo and radiation and his weight has been stable at around 170 lb which is 10 lb lower than his usual weight  He can not taste and smell food since he had chemotherapy but he is eating food  He is eating 3 meals a day  He reports having good appetite    Nutriscore   Questions  -Have you lost weight unintentionally in the last 3 months - No - 0  - Have you been eating poorly in the last 2 weeks , due to decreased appetite - No- 0  Location of neoplasm-pelvis - 1    I suggested eating nutritious food    Frailty score     > 10 # Unintentional weight loss over 1 year - None  Decreased  strength - None   Exhaustion - None  Low Physical activity - None  Slowed walking speed - none    He is currently not friend

## 2022-11-23 NOTE — ASSESSMENT & PLAN NOTE
His hemoglobin being mildly low could be from his cancer and the cancer treatment that he has had

## 2022-11-23 NOTE — ANESTHESIA PREPROCEDURE EVALUATION
[facility]  Anesthesia Pre-Operative Evaluation         Patient Name: Prosper Soliz  YOB: 1935  MRN: [id number]    SUBJECTIVE:     Pre-operative evaluation for Procedure(s) (LRB):  XI ROBOTIC, PROCTECTOMY (N/A)  XI ROBOTIC, PROCTECTOMY,  ABDOMINOPERINEAL (APR) (N/A)     11/23/2022    Prosper Soliz is a 87 y.o. male with a significant medical history of locally advanced rectal adenocarcinoma status post total neoadjuvant RT followed by chemotherapy, GERD with hiatal hernia, HTN, HLD, and hypothyroid who presents to pre op clinic for evaluation of the above procedure. Regrowth of rectal CA following total neoadjuvant therapy and CCR.     He denies history of CHF, MI, CAD, DM, asthma, bleeding diathesis, or complications with anesthesia. He reports being able to walk 2 miles and exercises by stretching at home. He is able to lie flat without dyspnea. He reports that his GERD is well managed. He has upper dentures.    LDA:   Port A Cath Single Lumen 06/14/21 1008 right internal jugular (Active)   Number of days: 527       Port A Cath Single Lumen right subclavian (Active)   Line Necessity Review Longterm central access required 11/21/22 1501   Site Assessment No drainage;No redness;No swelling 11/21/22 1501   Dressing Type Other (Comment) 11/21/22 1501   Patency/Care flushed w/o difficulty;blood return present;heparin locked 11/21/22 1501   Status Accessed 11/21/22 1501   Accessed by:  11/21/22 1501   Needle Insertion Date 11/21/22 11/21/22 1501   Needle Insertion Time 1501 11/21/22 1501   Type of Needle West 11/21/22 1501   Gauge 20 11/21/22 1501   Needle Length 3/4 in 11/21/22 1501   Needle Status Removed 11/21/22 1501   Flush Performed Yes 11/21/22 1501   Needle Removal Date 11/21/22 11/21/22 1501   Needle Removal Time 1502 11/21/22 1501   Deaccessed By  11/21/22 1501   Number of days:        Prev airway: None documented.      Patient Active Problem List   Diagnosis    Rectal  cancer    Secondary malignant neoplasm of intrapelvic lymph nodes       Review of patient's allergies indicates:  No Known Allergies    Current Inpatient Medications:   potassium chloride  40 mEq Oral Once       Current Outpatient Medications on File Prior to Encounter   Medication Sig Dispense Refill    amLODIPine (NORVASC) 5 MG tablet Take 5 mg by mouth once daily.      aspirin (ECOTRIN) 81 MG EC tablet Take 81 mg by mouth once daily.      atorvastatin (LIPITOR) 40 MG tablet atorvastatin 40 mg tablet   TAKE 1 TABLET BY MOUTH EVERY DAY IN THE EVENING      ciprofloxacin HCl (CIPRO) 500 MG tablet Take 1 tablet (500 mg total) by mouth 2 (two) times daily. 2 tablet 0    ergocalciferol (ERGOCALCIFEROL) 50,000 unit Cap Take 50,000 Units by mouth every 7 days.      levothyroxine (SYNTHROID) 50 MCG tablet Synthroid 50 mcg tablet   TAKE 1 TABLET BY MOUTH EVERY DAY      LIDOcaine-prilocaine (EMLA) cream Apply topically as needed. To port site 30 minutes prior to port access. 30 g 2    metoprolol succinate (TOPROL-XL) 50 MG 24 hr tablet Take 50 mg by mouth once daily.      metroNIDAZOLE (FLAGYL) 500 MG tablet Take 1 tablet (500 mg total) by mouth 2 (two) times daily. 2 tablet 0    olmesartan (BENICAR) 40 MG tablet Take 40 mg by mouth once daily.      potassium chloride (K-TAB) 20 mEq TAKE 1 TABLET BY MOUTH ONCE DAILY 90 tablet 4    promethazine (PHENERGAN) 12.5 MG Tab TAKE 1 TABLET BY MOUTH EVERY 6 HOURS AS NEEDED FOR NAUSEA 30 tablet 2    zolpidem (AMBIEN) 10 mg Tab Take 10 mg by mouth once daily.       Current Facility-Administered Medications on File Prior to Encounter   Medication Dose Route Frequency Provider Last Rate Last Admin    potassium chloride SA CR tablet 40 mEq  40 mEq Oral Once Anay Pennington NP           Past Surgical History:   Procedure Laterality Date    APPENDECTOMY      COLONOSCOPY W/ BIOPSIES  5.18.21 polyp in hepatic flexure,    ESOPHAGOGASTRODUODENOSCOPY  05/18/2021    POLYPECTOMY   05/21/2021    masss in the proximal rectum and mid rectum ( biopsy)    PROSTATECTOMY  1999       Social History     Socioeconomic History    Marital status:    Tobacco Use    Smoking status: Never    Smokeless tobacco: Never   Substance and Sexual Activity    Alcohol use: Never    Drug use: Never    Sexual activity: Not Currently       OBJECTIVE:     Vital Signs Range:  Vitals - 1 value per visit 10/28/2022 11/21/2022 11/21/2022   SYSTOLIC 135 - 149   DIASTOLIC 63 - 66   Pulse 59 - 65   Temp 98.3 - 97.3   Resp 18 - 18   SPO2 - - -   Weight (lb) 168 - 170   Weight (kg) 76.204 - 77.111   Height 70 - 70   BMI (Calculated) 24.1 - 24.4   VISIT REPORT - - -   Pain Score  - 0 -   Some recent data might be hidden         CBC:   Lab Results   Component Value Date    WBC 6.30 10/24/2022    HGB 13.0 (L) 10/24/2022    HCT 38.4 (L) 10/24/2022    MCV 91 10/24/2022     10/24/2022         CMP:   Sodium   Date Value Ref Range Status   10/24/2022 140 136 - 145 mmol/L Final     Potassium   Date Value Ref Range Status   10/24/2022 4.2 3.5 - 5.1 mmol/L Final     Chloride   Date Value Ref Range Status   10/24/2022 105 95 - 110 mmol/L Final     CO2   Date Value Ref Range Status   10/24/2022 32 (H) 23 - 29 mmol/L Final     Glucose   Date Value Ref Range Status   10/24/2022 120 (H) 74 - 106 mg/dL Final     BUN   Date Value Ref Range Status   10/24/2022 11 9 - 20 mg/dL Final     Creatinine   Date Value Ref Range Status   10/24/2022 0.91 0.80 - 1.50 mg/dL Final     Calcium   Date Value Ref Range Status   10/24/2022 8.9 8.4 - 10.2 mg/dL Final     Total Protein   Date Value Ref Range Status   10/24/2022 7.2 6.3 - 8.2 g/dL Final     Albumin   Date Value Ref Range Status   10/24/2022 3.7 3.5 - 5.2 g/dL Final     Total Bilirubin   Date Value Ref Range Status   10/24/2022 0.4 0.2 - 1.3 mg/dL Final     Alkaline Phosphatase   Date Value Ref Range Status   10/24/2022 78 38 - 145 U/L Final     AST   Date Value Ref Range Status    10/24/2022 27 17 - 59 U/L Final     ALT   Date Value Ref Range Status   10/24/2022 25 10 - 44 U/L Final     Anion Gap   Date Value Ref Range Status   10/24/2022 3 (L) 8 - 16 mmol/L Final     eGFR if    Date Value Ref Range Status   07/13/2022 >60 >60 mL/min/1.73 m^2 Final     eGFR if non    Date Value Ref Range Status   07/13/2022 >60 >60 mL/min/1.73 m^2 Final     Comment:     Calculation used to obtain the estimated glomerular filtration  rate (eGFR) is the CKD-EPI equation.          INR:  Lab Results   Component Value Date    INR 1.1 06/14/2021       EKG:   Results for orders placed or performed during the hospital encounter of 07/20/21   EKG 12-lead    Collection Time: 07/20/21  1:20 PM    Narrative    Test Reason : R07.9,    Vent. Rate : 064 BPM     Atrial Rate : 064 BPM     P-R Int : 204 ms          QRS Dur : 098 ms      QT Int : 428 ms       P-R-T Axes : 088 022 023 degrees     QTc Int : 441 ms    Normal sinus rhythm  Normal ECG  No previous ECGs available  Confirmed by Avery Pang MD ([id number]) on 7/20/2021 5:13:57 PM    Referred By: DORY ROMANO           Confirmed By:Avery Pang MD        2D ECHO:   No results found for this or any previous visit.         ASSESSMENT/PLAN:         Pre-op Assessment    I have reviewed the Patient Summary Reports.     I have reviewed the Nursing Notes. I have reviewed the NPO Status.   I have reviewed the Medications.     Review of Systems  Anesthesia Hx:  No problems with previous Anesthesia  Denies Family Hx of Anesthesia complications.   Denies Personal Hx of Anesthesia complications.   Hematology/Oncology:        Current/Recent Cancer.   EENT/Dental:EENT/Dental Normal   Cardiovascular:   Hypertension    Pulmonary:  Pulmonary Normal    Renal/:  Renal/ Normal     Hepatic/GI:   Hiatal Hernia, GERD Rectal cancer s/p chemo/rad, now with recurrence   Neurological:  Neurology Normal    Endocrine:   Hypothyroidism    Psych:  Psychiatric  Normal           Physical Exam  General: Well nourished, Cooperative, Alert and Oriented    Airway:  Mallampati: III / II  Mouth Opening: Normal  TM Distance: Normal  Tongue: Normal  Neck ROM: Normal ROM    Dental:  Dentures    Chest/Lungs:  Clear to auscultation, Normal Respiratory Rate    Heart:  Rate: Normal  Rhythm: Regular Rhythm  Sounds: Normal    Skin:  Scar(s)  Recent skin CA removal      Anesthesia Plan  Type of Anesthesia, risks & benefits discussed:    Anesthesia Type: Gen ETT  Intra-op Monitoring Plan: Standard ASA Monitors  Post Op Pain Control Plan: multimodal analgesia and IV/PO Opioids PRN  Induction:  IV  Airway Plan: Direct and Video, Post-Induction  Informed Consent: Informed consent signed with the Patient and all parties understand the risks and agree with anesthesia plan.  All questions answered. Patient consented to blood products? Yes  ASA Score: 3  Day of Surgery Review of History & Physical: H&P Update referred to the surgeon/provider.    Ready For Surgery From Anesthesia Perspective.     .

## 2022-11-28 ENCOUNTER — PATIENT MESSAGE (OUTPATIENT)
Dept: INTERNAL MEDICINE | Facility: CLINIC | Age: 87
End: 2022-11-28
Payer: COMMERCIAL

## 2022-12-14 ENCOUNTER — TELEPHONE (OUTPATIENT)
Dept: SURGERY | Facility: CLINIC | Age: 87
End: 2022-12-14
Payer: COMMERCIAL

## 2022-12-14 NOTE — TELEPHONE ENCOUNTER
----- Message from Jayden Lawrence sent at 12/14/2022  2:25 PM CST -----  Contact: Pt son David  The pt son, David, called and would like to have someone call him back    He has someone questions about the pt surgery tomorrow    David can be reached at [phone]

## 2022-12-15 ENCOUNTER — ANESTHESIA (OUTPATIENT)
Dept: SURGERY | Facility: HOSPITAL | Age: 87
DRG: 330 | End: 2022-12-15
Payer: MEDICARE

## 2022-12-15 ENCOUNTER — HOSPITAL ENCOUNTER (INPATIENT)
Facility: HOSPITAL | Age: 87
LOS: 6 days | Discharge: HOME HEALTH | DRG: 330 | End: 2022-12-21
Attending: COLON & RECTAL SURGERY | Admitting: COLON & RECTAL SURGERY
Payer: MEDICARE

## 2022-12-15 DIAGNOSIS — C20 RECTAL CANCER: ICD-10-CM

## 2022-12-15 LAB
ABO + RH BLD: NORMAL
BLD GP AB SCN CELLS X3 SERPL QL: NORMAL

## 2022-12-15 PROCEDURE — 37000008 HC ANESTHESIA 1ST 15 MINUTES: Performed by: COLON & RECTAL SURGERY

## 2022-12-15 PROCEDURE — 37000009 HC ANESTHESIA EA ADD 15 MINS: Performed by: COLON & RECTAL SURGERY

## 2022-12-15 PROCEDURE — 71000033 HC RECOVERY, INTIAL HOUR: Performed by: COLON & RECTAL SURGERY

## 2022-12-15 PROCEDURE — 45395 PR LAP, SURG PROCTECTOMY W COLOSTOMY: ICD-10-PCS | Mod: ,,, | Performed by: COLON & RECTAL SURGERY

## 2022-12-15 PROCEDURE — 63600175 PHARM REV CODE 636 W HCPCS: Performed by: NURSE ANESTHETIST, CERTIFIED REGISTERED

## 2022-12-15 PROCEDURE — 45395 LAP REMOVAL OF RECTUM: CPT | Mod: ,,, | Performed by: COLON & RECTAL SURGERY

## 2022-12-15 PROCEDURE — D9220A PRA ANESTHESIA: Mod: CRNA,,, | Performed by: NURSE ANESTHETIST, CERTIFIED REGISTERED

## 2022-12-15 PROCEDURE — 25000003 PHARM REV CODE 250: Performed by: COLON & RECTAL SURGERY

## 2022-12-15 PROCEDURE — 63600175 PHARM REV CODE 636 W HCPCS: Performed by: NURSE PRACTITIONER

## 2022-12-15 PROCEDURE — 88305 TISSUE EXAM BY PATHOLOGIST: CPT | Performed by: PATHOLOGY

## 2022-12-15 PROCEDURE — 27100025 HC TUBING, SET FLUID WARMER: Performed by: ANESTHESIOLOGY

## 2022-12-15 PROCEDURE — D9220A PRA ANESTHESIA: ICD-10-PCS | Mod: CRNA,,, | Performed by: NURSE ANESTHETIST, CERTIFIED REGISTERED

## 2022-12-15 PROCEDURE — 86920 COMPATIBILITY TEST SPIN: CPT | Performed by: COLON & RECTAL SURGERY

## 2022-12-15 PROCEDURE — 25000003 PHARM REV CODE 250: Performed by: NURSE ANESTHETIST, CERTIFIED REGISTERED

## 2022-12-15 PROCEDURE — 36000713 HC OR TIME LEV V EA ADD 15 MIN: Performed by: COLON & RECTAL SURGERY

## 2022-12-15 PROCEDURE — 36620 INSERTION CATHETER ARTERY: CPT | Mod: 59,,, | Performed by: ANESTHESIOLOGY

## 2022-12-15 PROCEDURE — D9220A PRA ANESTHESIA: ICD-10-PCS | Mod: ANES,,, | Performed by: ANESTHESIOLOGY

## 2022-12-15 PROCEDURE — 88305 TISSUE EXAM BY PATHOLOGIST: ICD-10-PCS | Mod: 26,,, | Performed by: PATHOLOGY

## 2022-12-15 PROCEDURE — C1751 CATH, INF, PER/CENT/MIDLINE: HCPCS | Performed by: ANESTHESIOLOGY

## 2022-12-15 PROCEDURE — 36000712 HC OR TIME LEV V 1ST 15 MIN: Performed by: COLON & RECTAL SURGERY

## 2022-12-15 PROCEDURE — 12000002 HC ACUTE/MED SURGE SEMI-PRIVATE ROOM

## 2022-12-15 PROCEDURE — 27201423 OPTIME MED/SURG SUP & DEVICES STERILE SUPPLY: Performed by: COLON & RECTAL SURGERY

## 2022-12-15 PROCEDURE — 71000015 HC POSTOP RECOV 1ST HR: Performed by: COLON & RECTAL SURGERY

## 2022-12-15 PROCEDURE — 88305 TISSUE EXAM BY PATHOLOGIST: CPT | Mod: 26,,, | Performed by: PATHOLOGY

## 2022-12-15 PROCEDURE — 27200677 HC TRANSDUCER MONITOR KIT SINGLE: Performed by: ANESTHESIOLOGY

## 2022-12-15 PROCEDURE — 27800505 HC CATH, RADIAL ARTERY KIT: Performed by: ANESTHESIOLOGY

## 2022-12-15 PROCEDURE — 25000003 PHARM REV CODE 250: Performed by: NURSE PRACTITIONER

## 2022-12-15 PROCEDURE — 86900 BLOOD TYPING SEROLOGIC ABO: CPT | Performed by: NURSE PRACTITIONER

## 2022-12-15 PROCEDURE — D9220A PRA ANESTHESIA: Mod: ANES,,, | Performed by: ANESTHESIOLOGY

## 2022-12-15 PROCEDURE — 36415 COLL VENOUS BLD VENIPUNCTURE: CPT | Performed by: NURSE PRACTITIONER

## 2022-12-15 PROCEDURE — 36620 ARTERIAL: ICD-10-PCS | Mod: 59,,, | Performed by: ANESTHESIOLOGY

## 2022-12-15 PROCEDURE — S0030 INJECTION, METRONIDAZOLE: HCPCS | Performed by: NURSE PRACTITIONER

## 2022-12-15 RX ORDER — SODIUM CHLORIDE 9 MG/ML
INJECTION, SOLUTION INTRAVENOUS CONTINUOUS
Status: DISCONTINUED | OUTPATIENT
Start: 2022-12-16 | End: 2022-12-16

## 2022-12-15 RX ORDER — INDOCYANINE GREEN AND WATER 25 MG
KIT INJECTION
Status: DISCONTINUED | OUTPATIENT
Start: 2022-12-15 | End: 2022-12-15

## 2022-12-15 RX ORDER — SODIUM CHLORIDE 9 MG/ML
INJECTION, SOLUTION INTRAVENOUS
Status: DISCONTINUED | OUTPATIENT
Start: 2022-12-15 | End: 2022-12-15

## 2022-12-15 RX ORDER — DEXAMETHASONE SODIUM PHOSPHATE 4 MG/ML
INJECTION, SOLUTION INTRA-ARTICULAR; INTRALESIONAL; INTRAMUSCULAR; INTRAVENOUS; SOFT TISSUE
Status: DISCONTINUED | OUTPATIENT
Start: 2022-12-15 | End: 2022-12-15

## 2022-12-15 RX ORDER — GABAPENTIN 300 MG/1
300 CAPSULE ORAL 3 TIMES DAILY
Status: DISCONTINUED | OUTPATIENT
Start: 2022-12-16 | End: 2022-12-16

## 2022-12-15 RX ORDER — HEPARIN SODIUM 5000 [USP'U]/ML
5000 INJECTION, SOLUTION INTRAVENOUS; SUBCUTANEOUS EVERY 8 HOURS
Status: COMPLETED | OUTPATIENT
Start: 2022-12-15 | End: 2022-12-15

## 2022-12-15 RX ORDER — LEVOTHYROXINE SODIUM 50 UG/1
50 TABLET ORAL
Status: DISCONTINUED | OUTPATIENT
Start: 2022-12-16 | End: 2022-12-21 | Stop reason: HOSPADM

## 2022-12-15 RX ORDER — LIDOCAINE HYDROCHLORIDE 10 MG/ML
1 INJECTION, SOLUTION EPIDURAL; INFILTRATION; INTRACAUDAL; PERINEURAL
Status: DISCONTINUED | OUTPATIENT
Start: 2022-12-15 | End: 2022-12-15

## 2022-12-15 RX ORDER — ACETAMINOPHEN 10 MG/ML
1000 INJECTION, SOLUTION INTRAVENOUS EVERY 8 HOURS
Status: COMPLETED | OUTPATIENT
Start: 2022-12-16 | End: 2022-12-16

## 2022-12-15 RX ORDER — FENTANYL CITRATE 50 UG/ML
25 INJECTION, SOLUTION INTRAMUSCULAR; INTRAVENOUS EVERY 5 MIN PRN
Status: DISCONTINUED | OUTPATIENT
Start: 2022-12-15 | End: 2022-12-16

## 2022-12-15 RX ORDER — HYDROCODONE BITARTRATE AND ACETAMINOPHEN 500; 5 MG/1; MG/1
TABLET ORAL
Status: DISCONTINUED | OUTPATIENT
Start: 2022-12-15 | End: 2022-12-15

## 2022-12-15 RX ORDER — OXYCODONE HYDROCHLORIDE 10 MG/1
10 TABLET ORAL EVERY 4 HOURS PRN
Status: DISCONTINUED | OUTPATIENT
Start: 2022-12-16 | End: 2022-12-21 | Stop reason: HOSPADM

## 2022-12-15 RX ORDER — MUPIROCIN 20 MG/G
OINTMENT TOPICAL 2 TIMES DAILY
Status: COMPLETED | OUTPATIENT
Start: 2022-12-16 | End: 2022-12-20

## 2022-12-15 RX ORDER — GABAPENTIN 300 MG/1
300 CAPSULE ORAL 3 TIMES DAILY
Status: DISCONTINUED | OUTPATIENT
Start: 2022-12-16 | End: 2022-12-21 | Stop reason: HOSPADM

## 2022-12-15 RX ORDER — ONDANSETRON 2 MG/ML
4 INJECTION INTRAMUSCULAR; INTRAVENOUS EVERY 12 HOURS PRN
Status: DISCONTINUED | OUTPATIENT
Start: 2022-12-16 | End: 2022-12-21 | Stop reason: HOSPADM

## 2022-12-15 RX ORDER — POTASSIUM CHLORIDE 14.9 MG/ML
INJECTION INTRAVENOUS CONTINUOUS PRN
Status: DISCONTINUED | OUTPATIENT
Start: 2022-12-15 | End: 2022-12-15

## 2022-12-15 RX ORDER — SODIUM CHLORIDE 0.9 % (FLUSH) 0.9 %
10 SYRINGE (ML) INJECTION
Status: DISCONTINUED | OUTPATIENT
Start: 2022-12-16 | End: 2022-12-21 | Stop reason: HOSPADM

## 2022-12-15 RX ORDER — ACETAMINOPHEN 500 MG
1000 TABLET ORAL EVERY 8 HOURS
Status: DISCONTINUED | OUTPATIENT
Start: 2022-12-17 | End: 2022-12-21 | Stop reason: HOSPADM

## 2022-12-15 RX ORDER — PROPOFOL 10 MG/ML
VIAL (ML) INTRAVENOUS
Status: DISCONTINUED | OUTPATIENT
Start: 2022-12-15 | End: 2022-12-15

## 2022-12-15 RX ORDER — ALVIMOPAN 12 MG/1
12 CAPSULE ORAL ONCE
Status: COMPLETED | OUTPATIENT
Start: 2022-12-15 | End: 2022-12-15

## 2022-12-15 RX ORDER — HALOPERIDOL 5 MG/ML
0.5 INJECTION INTRAMUSCULAR EVERY 10 MIN PRN
Status: DISCONTINUED | OUTPATIENT
Start: 2022-12-15 | End: 2022-12-16

## 2022-12-15 RX ORDER — ONDANSETRON 2 MG/ML
4 INJECTION INTRAMUSCULAR; INTRAVENOUS DAILY PRN
Status: DISCONTINUED | OUTPATIENT
Start: 2022-12-15 | End: 2022-12-16

## 2022-12-15 RX ORDER — SODIUM CHLORIDE 9 MG/ML
INJECTION, SOLUTION INTRAVENOUS CONTINUOUS
Status: DISCONTINUED | OUTPATIENT
Start: 2022-12-15 | End: 2022-12-15

## 2022-12-15 RX ORDER — ROCURONIUM BROMIDE 10 MG/ML
INJECTION, SOLUTION INTRAVENOUS
Status: DISCONTINUED | OUTPATIENT
Start: 2022-12-15 | End: 2022-12-15

## 2022-12-15 RX ORDER — GABAPENTIN 300 MG/1
300 CAPSULE ORAL
Status: COMPLETED | OUTPATIENT
Start: 2022-12-15 | End: 2022-12-15

## 2022-12-15 RX ORDER — TRIPROLIDINE/PSEUDOEPHEDRINE 2.5MG-60MG
600 TABLET ORAL
Status: COMPLETED | OUTPATIENT
Start: 2022-12-15 | End: 2022-12-15

## 2022-12-15 RX ORDER — OXYCODONE HYDROCHLORIDE 5 MG/1
5 TABLET ORAL EVERY 6 HOURS PRN
Status: DISCONTINUED | OUTPATIENT
Start: 2022-12-16 | End: 2022-12-21 | Stop reason: HOSPADM

## 2022-12-15 RX ORDER — MUPIROCIN 20 MG/G
1 OINTMENT TOPICAL
Status: COMPLETED | OUTPATIENT
Start: 2022-12-15 | End: 2022-12-15

## 2022-12-15 RX ORDER — IBUPROFEN 400 MG/1
800 TABLET ORAL EVERY 8 HOURS
Status: DISCONTINUED | OUTPATIENT
Start: 2022-12-17 | End: 2022-12-21 | Stop reason: HOSPADM

## 2022-12-15 RX ORDER — ATORVASTATIN CALCIUM 40 MG/1
40 TABLET, FILM COATED ORAL DAILY
Status: DISCONTINUED | OUTPATIENT
Start: 2022-12-16 | End: 2022-12-21 | Stop reason: HOSPADM

## 2022-12-15 RX ORDER — METOPROLOL SUCCINATE 50 MG/1
50 TABLET, EXTENDED RELEASE ORAL DAILY
Status: DISCONTINUED | OUTPATIENT
Start: 2022-12-16 | End: 2022-12-21 | Stop reason: HOSPADM

## 2022-12-15 RX ORDER — BUPIVACAINE HYDROCHLORIDE 2.5 MG/ML
INJECTION, SOLUTION EPIDURAL; INFILTRATION; INTRACAUDAL
Status: DISCONTINUED | OUTPATIENT
Start: 2022-12-15 | End: 2022-12-15

## 2022-12-15 RX ORDER — PHENYLEPHRINE HCL IN 0.9% NACL 1 MG/10 ML
SYRINGE (ML) INTRAVENOUS
Status: DISCONTINUED | OUTPATIENT
Start: 2022-12-15 | End: 2022-12-15

## 2022-12-15 RX ORDER — METRONIDAZOLE 500 MG/100ML
500 INJECTION, SOLUTION INTRAVENOUS
Status: COMPLETED | OUTPATIENT
Start: 2022-12-15 | End: 2022-12-15

## 2022-12-15 RX ORDER — FENTANYL CITRATE 50 UG/ML
INJECTION, SOLUTION INTRAMUSCULAR; INTRAVENOUS
Status: DISCONTINUED | OUTPATIENT
Start: 2022-12-15 | End: 2022-12-15

## 2022-12-15 RX ORDER — NEOSTIGMINE METHYLSULFATE 0.5 MG/ML
INJECTION, SOLUTION INTRAVENOUS
Status: DISCONTINUED | OUTPATIENT
Start: 2022-12-15 | End: 2022-12-15

## 2022-12-15 RX ORDER — TRAMADOL HYDROCHLORIDE 50 MG/1
50 TABLET ORAL EVERY 6 HOURS PRN
Status: DISCONTINUED | OUTPATIENT
Start: 2022-12-16 | End: 2022-12-21 | Stop reason: HOSPADM

## 2022-12-15 RX ORDER — ACETAMINOPHEN 650 MG/20.3ML
975 LIQUID ORAL
Status: COMPLETED | OUTPATIENT
Start: 2022-12-15 | End: 2022-12-15

## 2022-12-15 RX ORDER — ONDANSETRON 2 MG/ML
INJECTION INTRAMUSCULAR; INTRAVENOUS
Status: DISCONTINUED | OUTPATIENT
Start: 2022-12-15 | End: 2022-12-15

## 2022-12-15 RX ORDER — LIDOCAINE HYDROCHLORIDE 20 MG/ML
INJECTION, SOLUTION EPIDURAL; INFILTRATION; INTRACAUDAL; PERINEURAL
Status: DISCONTINUED | OUTPATIENT
Start: 2022-12-15 | End: 2022-12-15

## 2022-12-15 RX ADMIN — PROPOFOL 30 MG: 10 INJECTION, EMULSION INTRAVENOUS at 09:12

## 2022-12-15 RX ADMIN — Medication 100 MCG: at 10:12

## 2022-12-15 RX ADMIN — NEOSTIGMINE METHYLSULFATE 5 MG: 0.5 INJECTION, SOLUTION INTRAVENOUS at 11:12

## 2022-12-15 RX ADMIN — ROCURONIUM BROMIDE 10 MG: 10 INJECTION INTRAVENOUS at 09:12

## 2022-12-15 RX ADMIN — FENTANYL CITRATE 25 MCG: 50 INJECTION INTRAMUSCULAR; INTRAVENOUS at 05:12

## 2022-12-15 RX ADMIN — MUPIROCIN 1 G: 20 OINTMENT TOPICAL at 01:12

## 2022-12-15 RX ADMIN — POTASSIUM CHLORIDE: 200 INJECTION, SOLUTION INTRAVENOUS at 06:12

## 2022-12-15 RX ADMIN — SODIUM CHLORIDE, SODIUM GLUCONATE, SODIUM ACETATE, POTASSIUM CHLORIDE, MAGNESIUM CHLORIDE, SODIUM PHOSPHATE, DIBASIC, AND POTASSIUM PHOSPHATE: .53; .5; .37; .037; .03; .012; .00082 INJECTION, SOLUTION INTRAVENOUS at 06:12

## 2022-12-15 RX ADMIN — PROPOFOL 50 MG: 10 INJECTION, EMULSION INTRAVENOUS at 07:12

## 2022-12-15 RX ADMIN — FENTANYL CITRATE 50 MCG: 50 INJECTION INTRAMUSCULAR; INTRAVENOUS at 06:12

## 2022-12-15 RX ADMIN — GLYCOPYRROLATE 0.6 MG: 0.2 INJECTION INTRAMUSCULAR; INTRAVENOUS at 11:12

## 2022-12-15 RX ADMIN — HEPARIN SODIUM 5000 UNITS: 5000 INJECTION INTRAVENOUS; SUBCUTANEOUS at 01:12

## 2022-12-15 RX ADMIN — METRONIDAZOLE 500 MG: 500 INJECTION, SOLUTION INTRAVENOUS at 01:12

## 2022-12-15 RX ADMIN — ROCURONIUM BROMIDE 10 MG: 10 INJECTION INTRAVENOUS at 10:12

## 2022-12-15 RX ADMIN — SODIUM CHLORIDE, SODIUM GLUCONATE, SODIUM ACETATE, POTASSIUM CHLORIDE, MAGNESIUM CHLORIDE, SODIUM PHOSPHATE, DIBASIC, AND POTASSIUM PHOSPHATE: .53; .5; .37; .037; .03; .012; .00082 INJECTION, SOLUTION INTRAVENOUS at 08:12

## 2022-12-15 RX ADMIN — FENTANYL CITRATE 50 MCG: 50 INJECTION INTRAMUSCULAR; INTRAVENOUS at 09:12

## 2022-12-15 RX ADMIN — CEFTRIAXONE 2 G: 1 INJECTION, POWDER, FOR SOLUTION INTRAMUSCULAR; INTRAVENOUS at 06:12

## 2022-12-15 RX ADMIN — LIDOCAINE HYDROCHLORIDE 100 MG: 20 INJECTION, SOLUTION EPIDURAL; INFILTRATION; INTRACAUDAL; PERINEURAL at 05:12

## 2022-12-15 RX ADMIN — GLYCOPYRROLATE 0.2 MG: 0.2 INJECTION INTRAMUSCULAR; INTRAVENOUS at 10:12

## 2022-12-15 RX ADMIN — INDOCYANINE GREEN AND WATER 6.25 MG: KIT at 07:12

## 2022-12-15 RX ADMIN — ROCURONIUM BROMIDE 20 MG: 10 INJECTION INTRAVENOUS at 08:12

## 2022-12-15 RX ADMIN — ROCURONIUM BROMIDE 50 MG: 10 INJECTION INTRAVENOUS at 05:12

## 2022-12-15 RX ADMIN — DEXAMETHASONE SODIUM PHOSPHATE 4 MG: 4 INJECTION INTRA-ARTICULAR; INTRALESIONAL; INTRAMUSCULAR; INTRAVENOUS; SOFT TISSUE at 09:12

## 2022-12-15 RX ADMIN — IBUPROFEN 600 MG: 100 SUSPENSION ORAL at 01:12

## 2022-12-15 RX ADMIN — ONDANSETRON 4 MG: 2 INJECTION INTRAMUSCULAR; INTRAVENOUS at 09:12

## 2022-12-15 RX ADMIN — INDOCYANINE GREEN AND WATER 6.25 MG: KIT at 08:12

## 2022-12-15 RX ADMIN — SODIUM CHLORIDE: 0.9 INJECTION, SOLUTION INTRAVENOUS at 05:12

## 2022-12-15 RX ADMIN — PROPOFOL 150 MG: 10 INJECTION, EMULSION INTRAVENOUS at 05:12

## 2022-12-15 RX ADMIN — ACETAMINOPHEN 976.6 MG: 650 SOLUTION ORAL at 01:12

## 2022-12-15 RX ADMIN — ROCURONIUM BROMIDE 20 MG: 10 INJECTION INTRAVENOUS at 07:12

## 2022-12-15 RX ADMIN — GABAPENTIN 300 MG: 300 CAPSULE ORAL at 01:12

## 2022-12-15 RX ADMIN — SODIUM CHLORIDE: 9 INJECTION, SOLUTION INTRAVENOUS at 11:12

## 2022-12-15 RX ADMIN — ALVIMOPAN 12 MG: 12 CAPSULE ORAL at 01:12

## 2022-12-15 NOTE — H&P
"HPI:  Prosper Soliz is a 87 y.o. male with history of rectal CA now presenting for APR       Past Medical History:   Diagnosis Date    Cancer     GERD without esophagitis     Hiatal hernia 05/18/2021    normal mucose in second part of the duodenum     Hypercholesterolemia     Hypertension     Hypothyroidism     Left groin hernia     Prostate cancer 1999    Rectal cancer 05/2021        Past Surgical History:   Procedure Laterality Date    APPENDECTOMY      COLONOSCOPY W/ BIOPSIES  5.18.21 polyp in hepatic flexure,    ESOPHAGOGASTRODUODENOSCOPY  05/18/2021    POLYPECTOMY  05/21/2021    masss in the proximal rectum and mid rectum ( biopsy)    PROSTATECTOMY  1999       Review of patient's allergies indicates:  No Known Allergies    Family History   Problem Relation Age of Onset    Heart disease Mother     Stroke Mother     Lung cancer Father     Bone cancer Brother     Coronary artery disease Brother        Social History     Socioeconomic History    Marital status:    Tobacco Use    Smoking status: Never    Smokeless tobacco: Never   Substance and Sexual Activity    Alcohol use: Not Currently    Drug use: Never    Sexual activity: Not Currently       ROS:  GENERAL: No fever, chills, fatigability or weight loss.  Integument: No rashes, redness, icterus  CHEST: Denies WALKER, cyanosis, wheezing, cough and sputum production.  CARDIOVASCULAR: Denies chest pain, PND, orthopnea or reduced exercise tolerance.  GI: Denies abd pain, dysphagia, nausea, vomiting, no hematemesis   : Denies burning on urination, no hematuria, no bacteriuria  MSK: No deformities, swelling, joint pain swelling  Neurologic: No HAs, seizures, weakness, paresthesias, gait problems    PE:  General appearance well  BP (!) 161/73   Pulse 74   Temp 97.7 °F (36.5 °C) (Oral)   Resp 16   Ht 5' 10" (1.778 m)   Wt 73.4 kg (161 lb 13.1 oz)   SpO2 99%   BMI 23.22 kg/m²     Sclera/ Skin anicteric  LN none palpable  AT NC EOMI  Neck supple trachea " midline   Chest symmetric, nl excursion, no retractions, breathing comfortably  Abdomen  ND soft NT.  No masses, no organomegaly  EXT - no CCE  Neuro:  Mood/ affect nl, alert and oriented x 3, moves all ext's, gait nl      Assessment:  Regrowth of rectal CA following total neoadjuvant therapy and CCR  Good performance status    Presenting for APR for low rectal cancer    Daisy Schuster MD  Colon and Rectal Surgery Fellow

## 2022-12-15 NOTE — PROGRESS NOTES
Chart reviewed, and pre-op nursing care complete per orders. Safe surgery checklist complete, pathway complete. Son @ bedside, pt belongings given to family. Waiting for admission order, site-marking, and H&P update prior to surgery. Pt and son relaxing in room, call light within reach.

## 2022-12-16 LAB
ANION GAP SERPL CALC-SCNC: 9 MMOL/L (ref 8–16)
BASOPHILS # BLD AUTO: 0.01 K/UL (ref 0–0.2)
BASOPHILS NFR BLD: 0.1 % (ref 0–1.9)
BUN SERPL-MCNC: 10 MG/DL (ref 8–23)
CALCIUM SERPL-MCNC: 7.8 MG/DL (ref 8.7–10.5)
CHLORIDE SERPL-SCNC: 105 MMOL/L (ref 95–110)
CO2 SERPL-SCNC: 23 MMOL/L (ref 23–29)
CREAT SERPL-MCNC: 0.7 MG/DL (ref 0.5–1.4)
DIFFERENTIAL METHOD: ABNORMAL
EOSINOPHIL # BLD AUTO: 0 K/UL (ref 0–0.5)
EOSINOPHIL NFR BLD: 0.1 % (ref 0–8)
ERYTHROCYTE [DISTWIDTH] IN BLOOD BY AUTOMATED COUNT: 12.9 % (ref 11.5–14.5)
EST. GFR  (NO RACE VARIABLE): >60 ML/MIN/1.73 M^2
GLUCOSE SERPL-MCNC: 121 MG/DL (ref 70–110)
GLUCOSE SERPL-MCNC: 134 MG/DL (ref 70–110)
GLUCOSE SERPL-MCNC: 161 MG/DL (ref 70–110)
GLUCOSE SERPL-MCNC: 85 MG/DL (ref 70–110)
HCO3 UR-SCNC: 22.1 MMOL/L (ref 24–28)
HCO3 UR-SCNC: 24.3 MMOL/L (ref 24–28)
HCO3 UR-SCNC: 25.7 MMOL/L (ref 24–28)
HCT VFR BLD AUTO: 34.2 % (ref 40–54)
HCT VFR BLD CALC: 26 %PCV (ref 36–54)
HCT VFR BLD CALC: 30 %PCV (ref 36–54)
HCT VFR BLD CALC: 34 %PCV (ref 36–54)
HGB BLD-MCNC: 11.4 G/DL (ref 14–18)
IMM GRANULOCYTES # BLD AUTO: 0.02 K/UL (ref 0–0.04)
IMM GRANULOCYTES NFR BLD AUTO: 0.3 % (ref 0–0.5)
LYMPHOCYTES # BLD AUTO: 0.4 K/UL (ref 1–4.8)
LYMPHOCYTES NFR BLD: 4.5 % (ref 18–48)
MAGNESIUM SERPL-MCNC: 1.8 MG/DL (ref 1.6–2.6)
MCH RBC QN AUTO: 31.3 PG (ref 27–31)
MCHC RBC AUTO-ENTMCNC: 33.3 G/DL (ref 32–36)
MCV RBC AUTO: 94 FL (ref 82–98)
MONOCYTES # BLD AUTO: 1 K/UL (ref 0.3–1)
MONOCYTES NFR BLD: 12.4 % (ref 4–15)
NEUTROPHILS # BLD AUTO: 6.4 K/UL (ref 1.8–7.7)
NEUTROPHILS NFR BLD: 82.6 % (ref 38–73)
NRBC BLD-RTO: 0 /100 WBC
PCO2 BLDA: 39.8 MMHG (ref 35–45)
PCO2 BLDA: 45 MMHG (ref 35–45)
PCO2 BLDA: 47.9 MMHG (ref 35–45)
PH SMN: 7.34 [PH] (ref 7.35–7.45)
PH SMN: 7.34 [PH] (ref 7.35–7.45)
PH SMN: 7.35 [PH] (ref 7.35–7.45)
PHOSPHATE SERPL-MCNC: 3.2 MG/DL (ref 2.7–4.5)
PLATELET # BLD AUTO: 55 K/UL (ref 150–450)
PMV BLD AUTO: 12 FL (ref 9.2–12.9)
PO2 BLDA: 182 MMHG (ref 80–100)
PO2 BLDA: 190 MMHG (ref 80–100)
PO2 BLDA: 421 MMHG (ref 80–100)
POC BE: -1 MMOL/L
POC BE: -3 MMOL/L
POC BE: 0 MMOL/L
POC IONIZED CALCIUM: 0.97 MMOL/L (ref 1.06–1.42)
POC IONIZED CALCIUM: 1.03 MMOL/L (ref 1.06–1.42)
POC IONIZED CALCIUM: 1.13 MMOL/L (ref 1.06–1.42)
POC SATURATED O2: 100 % (ref 95–100)
POC TCO2: 23 MMOL/L (ref 23–27)
POC TCO2: 26 MMOL/L (ref 23–27)
POC TCO2: 27 MMOL/L (ref 23–27)
POTASSIUM BLD-SCNC: 2.8 MMOL/L (ref 3.5–5.1)
POTASSIUM BLD-SCNC: 3.8 MMOL/L (ref 3.5–5.1)
POTASSIUM BLD-SCNC: 3.8 MMOL/L (ref 3.5–5.1)
POTASSIUM SERPL-SCNC: 3.8 MMOL/L (ref 3.5–5.1)
RBC # BLD AUTO: 3.64 M/UL (ref 4.6–6.2)
SAMPLE: ABNORMAL
SODIUM BLD-SCNC: 141 MMOL/L (ref 136–145)
SODIUM BLD-SCNC: 142 MMOL/L (ref 136–145)
SODIUM BLD-SCNC: 145 MMOL/L (ref 136–145)
SODIUM SERPL-SCNC: 137 MMOL/L (ref 136–145)
WBC # BLD AUTO: 7.76 K/UL (ref 3.9–12.7)

## 2022-12-16 PROCEDURE — 80048 BASIC METABOLIC PNL TOTAL CA: CPT | Performed by: STUDENT IN AN ORGANIZED HEALTH CARE EDUCATION/TRAINING PROGRAM

## 2022-12-16 PROCEDURE — 97162 PT EVAL MOD COMPLEX 30 MIN: CPT

## 2022-12-16 PROCEDURE — 36415 COLL VENOUS BLD VENIPUNCTURE: CPT | Performed by: STUDENT IN AN ORGANIZED HEALTH CARE EDUCATION/TRAINING PROGRAM

## 2022-12-16 PROCEDURE — 85025 COMPLETE CBC W/AUTO DIFF WBC: CPT | Performed by: STUDENT IN AN ORGANIZED HEALTH CARE EDUCATION/TRAINING PROGRAM

## 2022-12-16 PROCEDURE — 84100 ASSAY OF PHOSPHORUS: CPT | Performed by: STUDENT IN AN ORGANIZED HEALTH CARE EDUCATION/TRAINING PROGRAM

## 2022-12-16 PROCEDURE — 20600001 HC STEP DOWN PRIVATE ROOM

## 2022-12-16 PROCEDURE — 25000003 PHARM REV CODE 250: Performed by: STUDENT IN AN ORGANIZED HEALTH CARE EDUCATION/TRAINING PROGRAM

## 2022-12-16 PROCEDURE — 63600175 PHARM REV CODE 636 W HCPCS: Performed by: STUDENT IN AN ORGANIZED HEALTH CARE EDUCATION/TRAINING PROGRAM

## 2022-12-16 PROCEDURE — 63600175 PHARM REV CODE 636 W HCPCS: Performed by: ANESTHESIOLOGY

## 2022-12-16 PROCEDURE — 83735 ASSAY OF MAGNESIUM: CPT | Performed by: STUDENT IN AN ORGANIZED HEALTH CARE EDUCATION/TRAINING PROGRAM

## 2022-12-16 PROCEDURE — 97116 GAIT TRAINING THERAPY: CPT

## 2022-12-16 PROCEDURE — 94799 UNLISTED PULMONARY SVC/PX: CPT

## 2022-12-16 RX ADMIN — ATORVASTATIN CALCIUM 40 MG: 40 TABLET, FILM COATED ORAL at 08:12

## 2022-12-16 RX ADMIN — ACETAMINOPHEN 1000 MG: 10 INJECTION INTRAVENOUS at 02:12

## 2022-12-16 RX ADMIN — GABAPENTIN 300 MG: 300 CAPSULE ORAL at 08:12

## 2022-12-16 RX ADMIN — MUPIROCIN: 20 OINTMENT TOPICAL at 09:12

## 2022-12-16 RX ADMIN — ACETAMINOPHEN 1000 MG: 10 INJECTION INTRAVENOUS at 09:12

## 2022-12-16 RX ADMIN — FENTANYL CITRATE 25 MCG: 50 INJECTION INTRAMUSCULAR; INTRAVENOUS at 12:12

## 2022-12-16 RX ADMIN — IBUPROFEN 800 MG: 800 INJECTION INTRAVENOUS at 04:12

## 2022-12-16 RX ADMIN — METOPROLOL SUCCINATE 50 MG: 50 TABLET, EXTENDED RELEASE ORAL at 08:12

## 2022-12-16 RX ADMIN — MUPIROCIN: 20 OINTMENT TOPICAL at 08:12

## 2022-12-16 RX ADMIN — OXYCODONE 5 MG: 5 TABLET ORAL at 12:12

## 2022-12-16 RX ADMIN — GABAPENTIN 300 MG: 300 CAPSULE ORAL at 02:12

## 2022-12-16 RX ADMIN — LEVOTHYROXINE SODIUM 50 MCG: 50 TABLET ORAL at 04:12

## 2022-12-16 RX ADMIN — ACETAMINOPHEN 1000 MG: 10 INJECTION INTRAVENOUS at 05:12

## 2022-12-16 RX ADMIN — IBUPROFEN 800 MG: 800 INJECTION INTRAVENOUS at 06:12

## 2022-12-16 RX ADMIN — TRAMADOL HYDROCHLORIDE 50 MG: 50 TABLET, COATED ORAL at 01:12

## 2022-12-16 RX ADMIN — IBUPROFEN 800 MG: 800 INJECTION INTRAVENOUS at 10:12

## 2022-12-16 NOTE — PLAN OF CARE
Received from PACU at 0105 via bed in stable condition . VSS . Abebrile .Son in room . Oriented to room,unit poutines and fall precautions and safety measures . He is [place] but oriented x 4 . Ostomy bag perged this am ,for was filled with Flatus . 30 ml liquid brown stool was also present . Stoma moist and red . Transverse lower abd incision and lap sites unremarkable . Pain managed well with pain regimen . Willis bundle maintained . Tolerating clear liquids . Continue IVF NS at 40 ml/hr . PIV sites x 2 benign . He splints abd and TCDB on command . BBS clear .MICHAEL peripheral edema . He is voiced concerns about being educated on ostomy care . Reassured him that wound care would educate him and his son prior to dc home . Continue plan of care   Problem: Infection  Goal: Absence of Infection Signs and Symptoms  Outcome: Ongoing, Progressing     Problem: Adult Inpatient Plan of Care  Goal: Plan of Care Review  Outcome: Ongoing, Progressing  Goal: Patient-Specific Goal (Individualized)  Outcome: Ongoing, Progressing  Goal: Absence of Hospital-Acquired Illness or Injury  Outcome: Ongoing, Progressing  Goal: Optimal Comfort and Wellbeing  Outcome: Ongoing, Progressing  Goal: Readiness for Transition of Care  Outcome: Ongoing, Progressing     Problem: Skin Injury Risk Increased  Goal: Skin Health and Integrity  Outcome: Ongoing, Progressing     Problem: Fall Injury Risk  Goal: Absence of Fall and Fall-Related Injury  Outcome: Ongoing, Progressing

## 2022-12-16 NOTE — NURSING TRANSFER
Nursing Transfer Note      12/16/2022     Reason patient is being transferred: post surgery    Transfer To: 1007 from PACU    Transfer via bed    Transfer with IV pole    Transported by Transport 2x    Medicines sent: NS infusing    Any special needs or follow-up needed: per Md orders    Chart send with patient: Yes    Notified: son    Patient reassessed at: 12/16/22 00:41

## 2022-12-16 NOTE — ANESTHESIA PROCEDURE NOTES
Arterial    Diagnosis: Rectal cancer    Patient location during procedure: done in OR    Staffing  Authorizing Provider: Traci Dominguez MD  Performing Provider: Traci Dominguez MD    Anesthesiologist was present at the time of the procedure.    Preanesthetic Checklist  Completed: patient identified, IV checked, site marked, risks and benefits discussed, surgical consent, monitors and equipment checked, pre-op evaluation, timeout performed and anesthesia consent givenArterial  Skin Prep: chlorhexidine gluconate  Local Infiltration: none  Orientation: right  Location: radial    Catheter Size: 20 G  Catheter placement by Anatomical landmarks. Heme positive aspiration all ports. Insertion Attempts: 1  Assessment  Dressing: secured with tape and tegaderm  Patient: Tolerated well

## 2022-12-16 NOTE — ANESTHESIA PROCEDURE NOTES
Intubation    Date/Time: 12/15/2022 5:54 PM  Performed by: Patricia Winston CRNA  Authorized by: Eliseo Henderson MD     Intubation:     Induction:  Intravenous    Intubated:  Postinduction    Mask Ventilation:  N/a    Attempts:  1    Attempted By:  CRNA    Method of Intubation:  Direct    Blade:  Brooke 2    Laryngeal View Grade: Grade I - full view of cords      Difficult Airway Encountered?: No      Complications:  None    Airway Device:  Oral endotracheal tube    Airway Device Size:  7.5    Style/Cuff Inflation:  Cuffed (inflated to minimal occlusive pressure)    Tube secured:  22    Secured at:  The teeth    Placement Verified By:  Capnometry    Complicating Factors:  None    Findings Post-Intubation:  BS equal bilateral and atraumatic/condition of teeth unchanged

## 2022-12-16 NOTE — PLAN OF CARE
Problem: Physical Therapy  Goal: Physical Therapy Goal  Description: Goals to met by 12/30/2022    1. Supine to sit with Modified Person  2. Sit to supine with Modified Person  3. Rolling to Left and Right with Modified Person.  4. Sit to stand transfer with Person  5. Bed to chair transfer with Person using No Assistive Device  6. Gait  x 250 feet with Person using No Assistive Device   7. Ascend/descend 3 stair(s) with no Handrails Person using No Assistive Device.   8. Lower extremity exercise program x15 reps per Instruction, with assistance as needed in order to facilitate improved postural control and improvement in functional independence    PT Eval: 12/16/2022

## 2022-12-16 NOTE — PLAN OF CARE
[address] [facility]  Initial Discharge Assessment       Primary Care Provider: Julien Stevenson NP    Admission Diagnosis: Rectal cancer [C20]    Admission Date: 12/15/2022  Expected Discharge Date: 12/17/2022    Discharge Barriers Identified: (P) None    Payor: BLUE CROSS BLUE SHIELD / Plan: [facility] FEDERAL STANDARD / Product Type: PPO /     Extended Emergency Contact Information  Primary Emergency Contact: dung soliz   [place]  Mobile Phone: [phone]  Relation: Son  Secondary Emergency Contact: Victor M Soliz  Mobile Phone: [phone]  Relation: Son  Preferred language: English   needed? No    Discharge Plan A: (P) Home with family  Discharge Plan B: (P) Home      [id number] - [place], [place] - [address]  [address]  [place] [address]  Phone: [phone] Fax: [phone]      Initial Assessment (most recent)       Adult Discharge Assessment - 12/16/22 1147          Discharge Assessment    Assessment Type Discharge Planning Assessment (P)      Confirmed/corrected address, phone number and insurance Yes (P)      Confirmed Demographics Correct on Facesheet (P)      Source of Information patient;family (P)      Communicated CAROLINE with patient/caregiver No (P)      Reason For Admission Rectal cancer (P)      People in Home alone (P)      Facility Arrived From: home (P)      Do you expect to return to your current living situation? Yes (P)      Do you have help at home or someone to help you manage your care at home? Yes (P)      Who are your caregiver(s) and their phone number(s)? elda Tapia, [phone] (P)      Prior to hospitilization cognitive status: Alert/Oriented (P)      Current cognitive status: Alert/Oriented (P)      Home Layout Other (see comments) (P)    3 steps outside    Equipment Currently Used at Home none (P)      Readmission within 30 days? No (P)      Do you currently have service(s) that help you manage your care at home? No (P)      Do you take  prescription medications? Yes (P)      Do you have prescription coverage? Yes (P)    Ph[facility]acy [facility] [place], [place]    Coverage BCBS (P)      Who is going to help you get home at discharge? Victor M Soliz, son, [phone] (P)      How do you get to doctors appointments? car, drives self (P)      Are you on dialysis? No (P)      Do you take coumadin? No (P)      Discharge Plan A Home with family (P)      Discharge Plan B Home (P)      DME Needed Upon Discharge  none (P)      Discharge Plan discussed with: Patient;Adult children (P)      Discharge Barriers Identified None (P)         OTHER    Name(s) of People in Home Lives alone (P)                  ANURAG DavisN, BS, RN, CCM

## 2022-12-16 NOTE — PT/OT/SLP EVAL
Physical Therapy Evaluation and Treatment    Patient Name:  Prosper Soliz   MRN:  [id number]  Admit Date: 12/15/2022  Admitting Diagnosis:  Rectal cancer   Length of Stay: 1 days  Recent Surgery: Procedure(s) (LRB):  XI ROBOTIC, PROCTECTOMY (N/A)  XI ROBOTIC, PROCTECTOMY,  ABDOMINOPERINEAL (APR) (N/A) 1 Day Post-Op    Recommendations:     Discharge Recommendations:  other (see comments)   Discharge Equipment Recommendations: none   Barriers to discharge: Inaccessible home environment, Decreased Caregiver support, and Evolving Clinical Presentation    Assessment:     Prosper Soliz is a 87 y.o. male admitted with a medical diagnosis of Rectal cancer.  He presents with the following impairments/functional limitations: impaired functional mobility, decreased lower extremity function, gait instability, decreased safety awareness, impaired balance.     Pt agreeable to therapy, walking with nursing and son in hallway upon my arrival. Therapists assumes care of patient and continue with ambulation in hallway. Pt ambulates with FAVIO handheld assist and min A, mildly unsteady. Pt back to room and into bedside chair, then transfers to bed with CGA, into supine with min A. Continue to train gait and balance to facilitate return to PLOF and independent living home alone.     Rehab Prognosis: Good; patient would benefit from acute skilled PT services to address these deficits and reach maximum level of function.      Treatment Tolerated: Well    Highest level of mobility achieved this visit: ambulates in hallway 250ft w/ FAVIO HHA and min A    Activity with RN/PCT: ambulate with family and nursing    Plan:     During this hospitalization, patient to be seen 3 x/week to address the identified rehab impairments via gait training, therapeutic activities, therapeutic exercises, neuromuscular re-education and progress towards the established goals.    Plan of Care Expires:  01/15/23    Subjective     JOSE Douglas notified prior to session.  "Pt's son present upon PT entrance into room.    Chief Complaint: none  Patient/Family Comments/goals: "I push mow my grass at home"  Pain/Comfort:  Pain Rating 1: 0/10    Social History:  Residence: lives alone 1-story house with 3 BONIFACIO and 0 HR.  Support available: son(s) - intermittent and RN neighbor  Equipment Used: none  Equipment Owned (not using): None  Prior level of function: independent  Work: Retired.   Drive: yes.       Objective:     Additional staff present: RN Misael    Patient found ambulatory in room/rain with: colostomy, lopez catheter, DUARTE drain     General Precautions: Standard, Cardiac fall   Orthopedic Precautions:N/A   Braces: N/A   Body mass index is 23.16 kg/m².  Oxygen Device: Room Air    Vitals: BP (!) 109/52 (BP Location: Right arm, Patient Position: Lying)   Pulse 66   Temp 98.1 °F (36.7 °C) (Oral)   Resp 20   Ht 5' 10" (1.778 m)   Wt 73.2 kg (161 lb 6 oz)   SpO2 95%   BMI 23.16 kg/m²     Exams:  Cognition:   Alert and Cooperative  Command following: Follows multistep verbal commands  Fluency: clear/fluent  Hearing: Intact  Vision:  Intact  Skin Integrity: Visible skin intact    Physical Exam:   Edema - None noted  ROM - FAVIO LEs WFL  Strength - FAVIO LEs WNL   Sensation - Intact to light touch  Coordination - No deficits noted    Outcome Measures:    AM-PAC 6 CLICK MOBILITY  Turning over in bed (including adjusting bedclothes, sheets and blankets)?: 3  Sitting down on and standing up from a chair with arms (e.g., wheelchair, bedside commode, etc.): 3  Moving from lying on back to sitting on the side of the bed?: 3  Moving to and from a bed to a chair (including a wheelchair)?: 3  Need to walk in hospital room?: 3  Climbing 3-5 steps with a railing?: 3  Basic Mobility Total Score: 18     Functional Mobility:    Bed Mobility:   Sit to Supine: minimum assistance; to R side of bed    Sitting Balance at Edge of Bed:  Assistance Level Required: Supervision  Time: 1 min  Postural deviations " noted: no deviations noted    Transfers:   Sit <> Stand Transfer: contact guard assistance with no assistive device  Stand <> Sit Transfer: contact guard assistance with no assistive device  [id number] from bedside chair  Chair > BedTransfer: Stand Pivot technique with contact guard assistance with no assistive device  Bed on patient's R    Standing Balance:    Assistance Level Required: Contact Guard Assistance  Patient used: FAVIO hand-held assist  Time: 12 min  Postural deviations noted: no deviations noted     Gait:   Patient ambulated: 250ft   Patient required: minimal assist  Patient used: FAVIO  hand-held assist  Gait Pattern observed: swing-through gait  Gait Deviation(s): occasional mildly unsteady gait  Impairments due to: impaired balance and medication side effects  Comments: clear to ambulate with nursing and son    Education:  Time provided for education, counseling and discussion of health disposition in regards to patient's current status  All questions answered within PT scope of practice and to patient's satisfaction  PT role in POC to address current functional deficits  Pt educated on proper body mechanics, safety techniques, and energy conservation with PT facilitation and cueing throughout session    Patient left supine with all lines intact, call button in reach, and RN Misael and son present.    GOALS:   Multidisciplinary Problems       Physical Therapy Goals          Problem: Physical Therapy    Goal Priority Disciplines Outcome Goal Variances Interventions   Physical Therapy Goal     PT, PT/OT Ongoing, Progressing     Description: Goals to met by 12/30/2022    1. Supine to sit with Modified [place]  2. Sit to supine with Modified [place]  3. Rolling to Left and Right with Modified [place].  4. Sit to stand transfer with [place]  5. Bed to chair transfer with [place] using No Assistive Device  6. Gait  x 250 feet with [place] using No Assistive Device   7.  Ascend/descend 3 stair(s) with no Handrails [place] using No Assistive Device.   8. Lower extremity exercise program x15 reps per Instruction, with assistance as needed in order to facilitate improved postural control and improvement in functional independence                         History:     Past Medical History:   Diagnosis Date    Cancer     GERD without esophagitis     Hiatal hernia 05/18/2021    normal mucose in second part of the duodenum     Hypercholesterolemia     Hypertension     Hypothyroidism     Left groin hernia     Prostate cancer 1999    Rectal cancer 05/2021       Past Surgical History:   Procedure Laterality Date    APPENDECTOMY      COLONOSCOPY W/ BIOPSIES  5.18.21 polyp in hepatic flexure,    ESOPHAGOGASTRODUODENOSCOPY  05/18/2021    POLYPECTOMY  05/21/2021    masss in the proximal rectum and mid rectum ( biopsy)    PROSTATECTOMY  1999    ROBOT-ASSISTED LAPAROSCOPIC ABDOMINOPERINEAL RESECTION USING DA JOSEMANUEL XI N/A 12/15/2022    Procedure: XI ROBOTIC, PROCTECTOMY,  ABDOMINOPERINEAL (APR);  Surgeon: DANAE Jensen MD;  [facility];  Service: Colon and Rectal;  Laterality: N/A;    ROBOT-ASSISTED LAPAROSCOPIC COMPLETION PROCTECTOMY USING DA JOSEMANUEL XI N/A 12/15/2022    Procedure: XI ROBOTIC, PROCTECTOMY;  Surgeon: DANAE Jensen MD;  Location: [facility] OR [facility]ND [facility];  Service: Colon and Rectal;  Laterality: N/A;       Time Tracking:     PT Received On: 12/16/22  PT Start Time: 1447     PT Stop Time: 1505  PT Total Time (min): 18 min     Billable Minutes: Evaluation 1 procedure and Gait Training 10 min    Cristian Rodríguez, PT, DPT  12/16/2022

## 2022-12-16 NOTE — TRANSFER OF CARE
"Anesthesia Transfer of Care Note    Patient: Prosper Soliz    Procedure(s) Performed: Procedure(s) (LRB):  XI ROBOTIC, PROCTECTOMY (N/A)  XI ROBOTIC, PROCTECTOMY,  ABDOMINOPERINEAL (APR) (N/A)    Patient location: PACU    Anesthesia Type: general    Transport from OR: Transported from OR on 6-10 L/min O2 by face mask with adequate spontaneous ventilation    Post pain: adequate analgesia    Post assessment: no apparent anesthetic complications    Post vital signs: stable    Level of consciousness: responds to stimulation    Nausea/Vomiting: no nausea/vomiting    Complications: none    Transfer of care protocol was followed      Last vitals:   Visit Vitals  BP (!) 112/54 (BP Location: Left arm, Patient Position: Lying)   Pulse (!) 56   Temp 36 °C (96.8 °F) (Temporal)   Resp 18   Ht 5' 10" (1.778 m)   Wt 73.4 kg (161 lb 13.1 oz)   SpO2 100%   BMI 23.22 kg/m²     "

## 2022-12-16 NOTE — NURSING
1800-    NEURO: WDL    CV: WDL SR 70s    RESP: WDL    GI: 5 dermabond lap punctures, rosebud stoma LLQ, liquid brown stool noted in bag, 3 transverse incision just above pelvis. All FELIPE, all CDI    : Indwelling lopez, d/c tomorrow.    SKIN/WOUND/DRESSING: as above    EQUIPMENT: n/a    LINES/GTTS: 20 L FA-SL, RT CH deaccessed.

## 2022-12-16 NOTE — PROGRESS NOTES
Patient Name: Prosper Soliz  Date: 12/16/2022  Service: Colon and Rectal Surgery    Subjective:  No acute events overnight. Pain well controlled. Tolerated small volume diet without nausea/vomiting. Ambulating and using IS. Good urine output. Denies fevers/chills/sweats, lightheadedness/dizziness, chest pain/shortness of breath, upper or lower extremity edema/pain.     Medications:    Current Facility-Administered Medications:     acetaminophen 1,000 mg/100 mL (10 mg/mL) injection 1,000 mg, 1,000 mg, Intravenous, Q8H, Stopped at 12/16/22 0517 **FOLLOWED BY** [START ON 12/17/2022] acetaminophen tablet 1,000 mg, 1,000 mg, Oral, Q8H, Daisy Schuster MD    atorvastatin tablet 40 mg, 40 mg, Oral, Daily, Daiys Schuster MD, 40 mg at 12/16/22 0850    gabapentin capsule 300 mg, 300 mg, Oral, TID, Cynthia Welsh, CHELITA    gabapentin capsule 300 mg, 300 mg, Oral, TID, Daisy Schuster MD, 300 mg at 12/16/22 0850    ibuprofen (CALDOLOR) 800mg/250mL D5W (READY TO MIX SYSTEM), 800 mg, Intravenous, Q8H, 800 mg at 12/16/22 0617 **FOLLOWED BY** [START ON 12/17/2022] ibuprofen tablet 800 mg, 800 mg, Oral, Q8H, Daisy Schuster MD    levothyroxine tablet 50 mcg, 50 mcg, Oral, Before breakfast, Daisy Schuster MD, 50 mcg at 12/16/22 0458    metoprolol succinate (TOPROL-XL) 24 hr tablet 50 mg, 50 mg, Oral, Daily, Daisy Schuster MD, 50 mg at 12/16/22 0850    mupirocin 2 % ointment, , Nasal, BID, Daisy Schuster MD, Given at 12/16/22 0908    ondansetron injection 4 mg, 4 mg, Intravenous, Q12H PRN, Daisy Schuster MD    oxyCODONE immediate release tablet 5 mg, 5 mg, Oral, Q6H PRN, Daisy Schuster MD, 5 mg at 12/16/22 0039    oxyCODONE immediate release tablet Tab 10 mg, 10 mg, Oral, Q4H PRN, Daisy Schuster MD    sodium chloride 0.9% flush 10 mL, 10 mL, Intra-Catheter, PRN, Daisy Schuster MD    traMADoL tablet 50 mg, 50 mg, Oral, Q6H PRN, Daisy Schuster MD, 50 mg at 12/16/22 0137    Vital Signs:  Vitals:    12/16/22 4553    BP: (!) 118/56   Pulse: 72   Resp: 18   Temp: 97.4 °F (36.3 °C)           Physical Exam:  General: Alert, oriented, in no apparent distress  HEENT: Sclera anicteric, trachea midline  Lungs: Normal respiratory rate and effort on room air  Abdomen: Soft, nontender, nondistended. Incisions clean/dry/intact without erythema or drainage. DUARTE drains serosanguinous. Perineal wound intact.  Extremities: Warm, well perfused, no edema, SCDs in place  Neuro: Grossly intact, moves all extremities  Psych: Appropriate affect    Laboratory Studies:    Complete Blood Count:  Lab Results   Component Value Date/Time    WBC 7.76 12/16/2022 04:57 AM    HGB 11.4 (L) 12/16/2022 04:57 AM    HCT 34.2 (L) 12/16/2022 04:57 AM    RBC 3.64 (L) 12/16/2022 04:57 AM    PLT 55 (L) 12/16/2022 04:57 AM       Basic Chemistry Panel:  Lab Results   Component Value Date/Time     12/16/2022 04:57 AM    K 3.8 12/16/2022 04:57 AM     12/16/2022 04:57 AM    CO2 23 12/16/2022 04:57 AM    BUN 10 12/16/2022 04:57 AM    CREATININE 0.7 12/16/2022 04:57 AM    CALCIUM 7.8 (L) 12/16/2022 04:57 AM             Imaging Studies:  None    Assessment:  Prosper Soliz is a 87 y.o. year old male s/p APR 12/15, doing well     Plan:       Continue multimodality pain control  Advance to a regular diet  Stop IV fluids  Continue Willis until POD2      Patient discussed with attending, Dr. Jensen.    Daisy Schuster MD  Colon and Rectal Surgery Fellow

## 2022-12-16 NOTE — ANESTHESIA POSTPROCEDURE EVALUATION
Anesthesia Post Evaluation    Patient: Prosper Soliz    Procedure(s) Performed: Procedure(s) (LRB):  XI ROBOTIC, PROCTECTOMY (N/A)  XI ROBOTIC, PROCTECTOMY,  ABDOMINOPERINEAL (APR) (N/A)    Final Anesthesia Type: general      Patient location during evaluation: PACU  Patient participation: Yes- Able to Participate  Level of consciousness: awake and alert  Post-procedure vital signs: reviewed and stable  Pain management: adequate  Airway patency: patent  YOUSIF mitigation strategies: Extubation while patient is awake and Multimodal analgesia  PONV status at discharge: No PONV  Anesthetic complications: no      Cardiovascular status: stable  Respiratory status: unassisted and spontaneous ventilation  Hydration status: euvolemic  Follow-up not needed.          Vitals Value Taken Time   /56 12/16/22 0429   Temp 35.6 °C (96.1 °F) 12/16/22 0429   Pulse 71 12/16/22 0429   Resp 16 12/16/22 0137   SpO2 97 % 12/16/22 0429         Event Time   Out of Recovery 12/16/2022 00:15:00         Pain/Britta Score: Pain Rating Prior to Med Admin: 0 (12/16/2022  4:59 AM)  Pain Rating Post Med Admin: 0 (12/16/2022  5:32 AM)  Britta Score: 10 (12/16/2022 12:41 AM)

## 2022-12-16 NOTE — OP NOTE
Date of procedure:   Devember 15, 2022    Indications for procedure:  88 yo male with locally advanced mid to low rectal CA.  he underwent short course radiotherapy completed 06/15/2021 followed by systemic chemotherapy, 8 cycles.  He initially experienced a complete clinical response following total neoadjuvant therapy.  However, in September 2022 he underwent surveillance flexible sigmoidoscopy which revealed evidence of an ulcerated lesion which was biopsied and revealed recurrent rectal cancer.  Surveillance MRI also revealed a small focus of diffusion restriction consistent with regrowth.  He consulted with his medical oncologist who recommended surgical resection as there were no systemic options available.  There was no evidence of distant metastatic disease.  I reviewed with the patient and his son surgical treatment options.  Given the patient's advanced age I recommended that he undergo abdominoperineal resection, robotic versus open.  The details of the procedure, permanence of colostomy, expected recuperation and the potential risks of surgery were discussed at length.    Preoperative diagnosis:   locally advanced low rectal cancer    Postoperative diagnosis:  same    Name of procedure:  robotic abdominoperineal resection    Surgeon:   DANAE Jensen MD    Assistant surgeon:  Daisy Schuster MD    Type of anesthesia:  GETA    EBL:  200  Cc's    Drains:  19 Trae to pelvis    Specimen: anus, rectum, sigmoid    Findings:  Ulcerated rectal mass, 4 cm from distal resection margin.  No evidence of distant metastatic diseae    Technique in detail:  The patient was brought to the operating room positively identified and placed on the operating table in supine position with sequential compression devices on his lower extremities.  The patient had undergone an outpatient will mechanical and oral antibiotic bowel preparation.  He received intravenous antibiotics.  He was initiated on an enhanced recovery  pathway including multimodal pain management.  He received subcutaneous aqueous heparin.  He was on a patient positioning system.  He underwent general endotracheal anesthesia without complication.  Willis catheter and orogastric tube were inserted.  He was then placed in the modified lithotomy position and padded Yellofin stirrups.  Both arms were wrapped with foam and tucked at his side.  A strap was placed across his upper torso.  His abdomen and perineum were prepared and draped in usual fashion.  Stoma site had been marked preoperatively for an end colostomy in the left lower quadrant.    A critical time-out was performed.  Veress needle was introduced into the peritoneal cavity at quigley's point and a saline drop test was noted to be negative.  Insufflation of the peritoneal cavity was performed using carbon dioxide gas.  Pressures were monitored throughout the procedure.  A 5 mm port was then placed into the peritoneal cavity via stab incision in the right upper quadrant using an Optiview technique.  Video laparoscopy was undertaken using a 5 mm 30 degree laparoscope.  There was no evidence of intra-abdominal injury.  Video laparoscopy also confirmed there was no evidence of distant metastatic spread.  Four robotic ports were then placed into the peritoneal cavity under direct laparoscopic guidance with through the ports being placed in oblique fashion from the right lower quadrant 2 fingerbreadths medial to the anterior superior iliac spine towards the left upper quadrant spaced apart by 8-9 cm.  The 4th port was placed in the left side of the abdomen lateral to the marked site for the colostomy.  The 5 mm port in the right upper quadrant was exchanged for an 8 mm AirSeal port and a 5 mm laparoscopic port was placed in the right side of the abdomen as an assistant port.  The patient was placed in Trendelenburg position and rotated to the right.  The small bowel was swept out of the pelvis into the right of  the aorta.  The right side of the retroperitoneum was scored from the sacral promontory towards the root of the inferior mesenteric artery.  Medial to lateral dissection of the sigmoid mesentery was performed with identification and preservation of the left gonadal vessel left ureter.  Dissection of the inferior mesenteric artery was undertaken isolating the vessel distal to the ascending branch of left colic artery.  The inferior mesenteric artery was then clipped proximally and divided distally using the vessel sealer device.  Medial to lateral dissection of the sigmoid left colon were then undertaken up to the left colic gutter.  I I then mobilized the sigmoid colon and left colon along the white line of Toldt completing the mobilization of the sigmoid left colon.  Again, the left ureter was carefully preserved from harm.  I divided the mesentery of the sigmoid colon using the vessel sealer device out to the distal descending colon.  ICG scintigraphy revealed excellent perfusion of the left colon.    Pelvic dissection was then undertaken.  The retroperitoneum on either side of the mesorectum was scored down to the level of the cul-de-sac between the rectum and the bladder anteriorly.  Sharp dissection of the mesorectum was undertaken posteriorly down to the level of the pelvic floor.  Laterally mobilization of the mesorectum was carried out with care being taken to avoid dissection to the level of the levator hiatus itself but rather keeping the dissection onto the levator muscles laterally.  Anteriorly, the dissection was noted to be quite tedious given the patient's previous open prostatectomy.  This was undertaken sharply using sharp scissor dissection.  Dissection was carried as low as possible from above and essentially down to the level of the urogenital hiatus.  Laterally the levator muscles were divided using cautery scissors on either side of the rectum.    At this point the robotic portion of the  procedure ceased.  The robot was undocked.  The patient was placed in high lithotomy position.  We went to the perineum and re-prepped the perineum.  The perineal portion of the procedure was performed.  The skin incision was made in an elliptical fashion extending onto the midportion of the perineum laterally to the medial aspect of the ischial tuberosities and posteriorly over the coccyx.  This was deepened into the subcutaneous tissues.  Lone star retractor was employed for exposure.  The remaining levator muscles were divided and posteriorly the anal coccygeal ligament was transected.  Lastly the anterior aspect of the dissection was undertaken using sharp cold scissor dissection to avoid injury to the bladder and urethra.  The specimen was brought out through the perineal wound.  As there was evidence of contamination of the pelvis we proceeded with creating a small Pfannenstiel incision to help with pelvic lavage using a Pulsavac device.  Hemostasis was assured.  The perineal wound was closed in layers using 2-0 Vicryl suture to approximate the subcutaneous tissues.  The skin was repaired using horizontal mattress sutures of 3-0 Vicryl.    Stoma aperture was created at the premarked site excising a disc of skin and opening the fascia vertically.  The rectus muscle was split bluntly and the posterior sheath was opened vertically as well using the Bovie.  Care was taken to avoid injury to the underlying viscera.  The end of the descending colon was easily brought up through the aperture.  A drain was placed into the pelvis via a right-sided 5 mm assistant port and secured to the skin using nylon.  The posterior sheath of the Pfannenstiel incision was closed with 0 Vicryl suture, continuous.  A 12 mm fascial defect in the right lower quadrant was repaired with 0 Vicryl suture on a UR 5 needle.  The anterior rectus fascial defect was repaired using continuous 1 PDS suture.  All skin incisions that were then  repaired with Monocryl suture and Dermabond.  The colostomy was matured as an everted Lucila end colostomy with interrupted 3-0 Vicryl suture.  Stoma appliance was placed over the stoma.      The patient was returned to the supine position.  He was awakened from anesthesia.  He was extubated without incident and transported to the recovery area in stable condition.    The da Irina robot was brought onto the operative field and docked.

## 2022-12-17 PROCEDURE — 63600175 PHARM REV CODE 636 W HCPCS: Performed by: STUDENT IN AN ORGANIZED HEALTH CARE EDUCATION/TRAINING PROGRAM

## 2022-12-17 PROCEDURE — 20600001 HC STEP DOWN PRIVATE ROOM

## 2022-12-17 PROCEDURE — 25000003 PHARM REV CODE 250: Performed by: STUDENT IN AN ORGANIZED HEALTH CARE EDUCATION/TRAINING PROGRAM

## 2022-12-17 PROCEDURE — 99900035 HC TECH TIME PER 15 MIN (STAT)

## 2022-12-17 PROCEDURE — 94761 N-INVAS EAR/PLS OXIMETRY MLT: CPT

## 2022-12-17 RX ORDER — ENOXAPARIN SODIUM 100 MG/ML
40 INJECTION SUBCUTANEOUS EVERY 24 HOURS
Status: DISCONTINUED | OUTPATIENT
Start: 2022-12-18 | End: 2022-12-18

## 2022-12-17 RX ADMIN — MUPIROCIN: 20 OINTMENT TOPICAL at 10:12

## 2022-12-17 RX ADMIN — ACETAMINOPHEN 1000 MG: 500 TABLET ORAL at 05:12

## 2022-12-17 RX ADMIN — IBUPROFEN 800 MG: 400 TABLET, FILM COATED ORAL at 05:12

## 2022-12-17 RX ADMIN — LEVOTHYROXINE SODIUM 50 MCG: 50 TABLET ORAL at 05:12

## 2022-12-17 RX ADMIN — MUPIROCIN: 20 OINTMENT TOPICAL at 11:12

## 2022-12-17 RX ADMIN — GABAPENTIN 300 MG: 300 CAPSULE ORAL at 10:12

## 2022-12-17 RX ADMIN — GABAPENTIN 300 MG: 300 CAPSULE ORAL at 09:12

## 2022-12-17 RX ADMIN — GABAPENTIN 300 MG: 300 CAPSULE ORAL at 05:12

## 2022-12-17 RX ADMIN — METOPROLOL SUCCINATE 50 MG: 50 TABLET, EXTENDED RELEASE ORAL at 10:12

## 2022-12-17 RX ADMIN — IBUPROFEN 800 MG: 400 TABLET, FILM COATED ORAL at 09:12

## 2022-12-17 RX ADMIN — ATORVASTATIN CALCIUM 40 MG: 40 TABLET, FILM COATED ORAL at 10:12

## 2022-12-17 RX ADMIN — ONDANSETRON 4 MG: 2 INJECTION INTRAMUSCULAR; INTRAVENOUS at 05:12

## 2022-12-17 NOTE — PLAN OF CARE
VSS . Afebrile . Pain managed well with scheduled Tylenol and Ibuprofen . Tolerating diet. He and his son need ostomy education and demonstration . PIV site unremarkable . Will dc Willis this am . Stoma site and incisions unremarkable . Continue care   Problem: Infection  Goal: Absence of Infection Signs and Symptoms  12/17/2022 0420 by Heaven Lee RN  Outcome: Ongoing, Progressing  12/17/2022 0412 by Heaven Lee RN  Outcome: Ongoing, Progressing     Problem: Adult Inpatient Plan of Care  Goal: Plan of Care Review  12/17/2022 0420 by Heaven Lee RN  Outcome: Ongoing, Progressing  12/17/2022 0412 by Heaven Lee RN  Outcome: Ongoing, Progressing  Goal: Patient-Specific Goal (Individualized)  12/17/2022 0420 by Heaven Lee RN  Outcome: Ongoing, Progressing  12/17/2022 0412 by Heaven Lee RN  Outcome: Ongoing, Progressing  Goal: Absence of Hospital-Acquired Illness or Injury  12/17/2022 0420 by Heaven Lee RN  Outcome: Ongoing, Progressing  12/17/2022 0412 by Heaven Lee RN  Outcome: Ongoing, Progressing  Goal: Optimal Comfort and Wellbeing  Outcome: Met  Goal: Readiness for Transition of Care  12/17/2022 0420 by Heaven Lee RN  Outcome: Ongoing, Progressing  12/17/2022 0412 by Heaven Lee RN  Outcome: Ongoing, Progressing     Problem: Skin Injury Risk Increased  Goal: Skin Health and Integrity  12/17/2022 0420 by Heaven Lee RN  Outcome: Ongoing, Progressing  12/17/2022 0412 by Heaven Lee RN  Outcome: Ongoing, Progressing     Problem: Fall Injury Risk  Goal: Absence of Fall and Fall-Related Injury  12/17/2022 0420 by Heaven Lee RN  Outcome: Ongoing, Progressing  12/17/2022 0412 by Heaven Lee RN  Outcome: Ongoing, Progressing

## 2022-12-17 NOTE — PROGRESS NOTES
[facility]  Colorectal Surgery  Progress Note    Patient Name: Prosper Soliz  MRN: [id number]  Admission Date: 12/15/2022  Hospital Length of Stay: 2 days  Attending Physician: DANAE Jensen MD    Subjective:     Interval History:   No acute events overnight. Tolerated LRD without issue    Post-Op Info:  Procedure(s) (LRB):  XI ROBOTIC, PROCTECTOMY (N/A)  XI ROBOTIC, PROCTECTOMY,  ABDOMINOPERINEAL (APR) (N/A)   2 Days Post-Op      Medications:  Continuous Infusions:  Scheduled Meds:   acetaminophen  1,000 mg Oral Q8H    atorvastatin  40 mg Oral Daily    [START ON 12/18/2022] enoxaparin  40 mg Subcutaneous Daily    gabapentin  300 mg Oral TID    ibuprofen  800 mg Oral Q8H    levothyroxine  50 mcg Oral Before breakfast    metoprolol succinate  50 mg Oral Daily    mupirocin   Nasal BID     PRN Meds:   ondansetron    oxyCODONE    oxyCODONE    sodium chloride 0.9%    traMADoL        Objective:     Vital Signs (Most Recent):  Temp: 97.6 °F (36.4 °C) (12/17/22 0806)  Pulse: 63 (12/17/22 0806)  Resp: 20 (12/17/22 0806)  BP: (!) 103/51 (12/17/22 0806)  SpO2: 96 % (12/17/22 0806)   Vital Signs (24h Range):  Temp:  [97.1 °F (36.2 °C)-98.8 °F (37.1 °C)] 97.6 °F (36.4 °C)  Pulse:  [58-66] 63  Resp:  [19-20] 20  SpO2:  [95 %-96 %] 96 %  BP: ()/(45-56) 103/51     Intake/Output - Last 3 Shifts         12/15 0700 12/16 0659 12/16 0700 12/17 0659 12/17 0700 12/18 0659    P.O. 300 360     IV Piggyback 3510 187.7     Total Intake(mL/kg) 3810 (52) 547.7 (7.5)     Urine (mL/kg/hr) 1025 1550 (0.9)     Drains 350 75     Stool 30 0     Blood 300      Total Output 1705 1625     Net +2105 -1077.3            Stool Occurrence  1 x             Physical Exam  General: Alert, oriented, in no apparent distress  HEENT: Sclera anicteric, trachea midline  Lungs: Normal respiratory rate and effort on room air  Abdomen: Soft, nontender, nondistended. Incisions clean/dry/intact without erythema or drainage. DUARTE drains serosanguinous.  Perineal wound intact.  Extremities: Warm, well perfused, no edema, SCDs in place  Neuro: Grossly intact, moves all extremities  Psych: Appropriate affect    Significant Labs:  BMP:   Recent Labs   Lab 12/16/22  0457   *      K 3.8      CO2 23   BUN 10   CREATININE 0.7   CALCIUM 7.8*   MG 1.8     CBC:   Recent Labs   Lab 12/16/22  0457   WBC 7.76   RBC 3.64*   HGB 11.4*   HCT 34.2*   PLT 55*   MCV 94   MCH 31.3*   MCHC 33.3       Significant Diagnostics:      Assessment/Plan:     Active Diagnoses:    Diagnosis Date Noted POA    PRINCIPAL PROBLEM:  Rectal cancer [C20] 05/28/2021 Yes      Problems Resolved During this Admission:       Prosper Soliz is a 87 y.o. year old male s/p APR 12/15, doing well      - Continue LRD  - DC lopez, Due to void  - Ostomy teaching  - Monitor BF    JOÃO DOTY MD  Colorectal Surgery  [facility]

## 2022-12-17 NOTE — NURSING
Call and spoke with onCall Dr Hernandez patient noted to be bleeding from a spot in his rectum He is noted with a stitch that has opened in the area.  His bleeding has subsided. Advised to monitor the area . TAYLOR

## 2022-12-17 NOTE — PLAN OF CARE
VSS . Afebrile . Pain managed well with scheduled   Problem: Infection  Goal: Absence of Infection Signs and Symptoms  Outcome: Ongoing, Progressing     Problem: Adult Inpatient Plan of Care  Goal: Plan of Care Review  Outcome: Ongoing, Progressing  Goal: Patient-Specific Goal (Individualized)  Outcome: Ongoing, Progressing  Goal: Absence of Hospital-Acquired Illness or Injury  Outcome: Ongoing, Progressing  Goal: Optimal Comfort and Wellbeing  Outcome: Met  Goal: Readiness for Transition of Care  Outcome: Ongoing, Progressing     Problem: Skin Injury Risk Increased  Goal: Skin Health and Integrity  Outcome: Ongoing, Progressing     Problem: Fall Injury Risk  Goal: Absence of Fall and Fall-Related Injury  Outcome: Ongoing, Progressing

## 2022-12-18 LAB
ALBUMIN SERPL BCP-MCNC: 2.4 G/DL (ref 3.5–5.2)
ALP SERPL-CCNC: 51 U/L (ref 55–135)
ALT SERPL W/O P-5'-P-CCNC: 10 U/L (ref 10–44)
ANION GAP SERPL CALC-SCNC: 7 MMOL/L (ref 8–16)
AST SERPL-CCNC: 14 U/L (ref 10–40)
BASOPHILS # BLD AUTO: 0.02 K/UL (ref 0–0.2)
BASOPHILS NFR BLD: 0.3 % (ref 0–1.9)
BILIRUB SERPL-MCNC: 0.5 MG/DL (ref 0.1–1)
BUN SERPL-MCNC: 9 MG/DL (ref 8–23)
CALCIUM SERPL-MCNC: 8.3 MG/DL (ref 8.7–10.5)
CHLORIDE SERPL-SCNC: 106 MMOL/L (ref 95–110)
CO2 SERPL-SCNC: 25 MMOL/L (ref 23–29)
CREAT SERPL-MCNC: 0.7 MG/DL (ref 0.5–1.4)
CRP SERPL-MCNC: 171.1 MG/L (ref 0–8.2)
DIFFERENTIAL METHOD: ABNORMAL
EOSINOPHIL # BLD AUTO: 0.2 K/UL (ref 0–0.5)
EOSINOPHIL NFR BLD: 2.8 % (ref 0–8)
ERYTHROCYTE [DISTWIDTH] IN BLOOD BY AUTOMATED COUNT: 13.3 % (ref 11.5–14.5)
EST. GFR  (NO RACE VARIABLE): >60 ML/MIN/1.73 M^2
GLUCOSE SERPL-MCNC: 106 MG/DL (ref 70–110)
HCT VFR BLD AUTO: 27 % (ref 40–54)
HGB BLD-MCNC: 9.4 G/DL (ref 14–18)
IMM GRANULOCYTES # BLD AUTO: 0.04 K/UL (ref 0–0.04)
IMM GRANULOCYTES NFR BLD AUTO: 0.7 % (ref 0–0.5)
LYMPHOCYTES # BLD AUTO: 0.3 K/UL (ref 1–4.8)
LYMPHOCYTES NFR BLD: 4.5 % (ref 18–48)
MAGNESIUM SERPL-MCNC: 2 MG/DL (ref 1.6–2.6)
MCH RBC QN AUTO: 32.2 PG (ref 27–31)
MCHC RBC AUTO-ENTMCNC: 34.8 G/DL (ref 32–36)
MCV RBC AUTO: 93 FL (ref 82–98)
MONOCYTES # BLD AUTO: 0.8 K/UL (ref 0.3–1)
MONOCYTES NFR BLD: 13.6 % (ref 4–15)
MPV, BLUE TOP: 10.6 FL (ref 9.2–12.9)
NEUTROPHILS # BLD AUTO: 4.5 K/UL (ref 1.8–7.7)
NEUTROPHILS NFR BLD: 78.1 % (ref 38–73)
NRBC BLD-RTO: 0 /100 WBC
PHOSPHATE SERPL-MCNC: 2.3 MG/DL (ref 2.7–4.5)
PLATELET # BLD AUTO: ABNORMAL K/UL (ref 150–450)
PLATELET BLD QL SMEAR: ABNORMAL
PLATELET, BLUE TOP: 64 K/UL (ref 150–450)
PMV BLD AUTO: ABNORMAL FL (ref 9.2–12.9)
POTASSIUM SERPL-SCNC: 3.7 MMOL/L (ref 3.5–5.1)
PROT SERPL-MCNC: 5.2 G/DL (ref 6–8.4)
RBC # BLD AUTO: 2.92 M/UL (ref 4.6–6.2)
SODIUM SERPL-SCNC: 138 MMOL/L (ref 136–145)
WBC # BLD AUTO: 5.81 K/UL (ref 3.9–12.7)

## 2022-12-18 PROCEDURE — 63600175 PHARM REV CODE 636 W HCPCS: Performed by: STUDENT IN AN ORGANIZED HEALTH CARE EDUCATION/TRAINING PROGRAM

## 2022-12-18 PROCEDURE — 36415 COLL VENOUS BLD VENIPUNCTURE: CPT | Performed by: STUDENT IN AN ORGANIZED HEALTH CARE EDUCATION/TRAINING PROGRAM

## 2022-12-18 PROCEDURE — 36415 COLL VENOUS BLD VENIPUNCTURE: CPT | Performed by: COLON & RECTAL SURGERY

## 2022-12-18 PROCEDURE — 86140 C-REACTIVE PROTEIN: CPT | Performed by: STUDENT IN AN ORGANIZED HEALTH CARE EDUCATION/TRAINING PROGRAM

## 2022-12-18 PROCEDURE — 20600001 HC STEP DOWN PRIVATE ROOM

## 2022-12-18 PROCEDURE — 94761 N-INVAS EAR/PLS OXIMETRY MLT: CPT

## 2022-12-18 PROCEDURE — 83735 ASSAY OF MAGNESIUM: CPT | Performed by: STUDENT IN AN ORGANIZED HEALTH CARE EDUCATION/TRAINING PROGRAM

## 2022-12-18 PROCEDURE — 84100 ASSAY OF PHOSPHORUS: CPT | Performed by: STUDENT IN AN ORGANIZED HEALTH CARE EDUCATION/TRAINING PROGRAM

## 2022-12-18 PROCEDURE — 80053 COMPREHEN METABOLIC PANEL: CPT | Performed by: STUDENT IN AN ORGANIZED HEALTH CARE EDUCATION/TRAINING PROGRAM

## 2022-12-18 PROCEDURE — 85025 COMPLETE CBC W/AUTO DIFF WBC: CPT | Performed by: STUDENT IN AN ORGANIZED HEALTH CARE EDUCATION/TRAINING PROGRAM

## 2022-12-18 PROCEDURE — 85049 AUTOMATED PLATELET COUNT: CPT | Performed by: COLON & RECTAL SURGERY

## 2022-12-18 PROCEDURE — 99900035 HC TECH TIME PER 15 MIN (STAT)

## 2022-12-18 PROCEDURE — 25000003 PHARM REV CODE 250: Performed by: STUDENT IN AN ORGANIZED HEALTH CARE EDUCATION/TRAINING PROGRAM

## 2022-12-18 RX ORDER — DEXTROSE MONOHYDRATE, SODIUM CHLORIDE, AND POTASSIUM CHLORIDE 50; 1.49; 4.5 G/1000ML; G/1000ML; G/1000ML
INJECTION, SOLUTION INTRAVENOUS CONTINUOUS
Status: DISCONTINUED | OUTPATIENT
Start: 2022-12-18 | End: 2022-12-19

## 2022-12-18 RX ADMIN — ACETAMINOPHEN 1000 MG: 500 TABLET ORAL at 09:12

## 2022-12-18 RX ADMIN — MUPIROCIN: 20 OINTMENT TOPICAL at 09:12

## 2022-12-18 RX ADMIN — ACETAMINOPHEN 1000 MG: 500 TABLET ORAL at 05:12

## 2022-12-18 RX ADMIN — DEXTROSE MONOHYDRATE, SODIUM CHLORIDE, AND POTASSIUM CHLORIDE: 50; 4.5; 1.49 INJECTION, SOLUTION INTRAVENOUS at 05:12

## 2022-12-18 RX ADMIN — ACETAMINOPHEN 1000 MG: 500 TABLET ORAL at 01:12

## 2022-12-18 RX ADMIN — IBUPROFEN 800 MG: 400 TABLET, FILM COATED ORAL at 09:12

## 2022-12-18 RX ADMIN — IBUPROFEN 800 MG: 400 TABLET, FILM COATED ORAL at 01:12

## 2022-12-18 RX ADMIN — LEVOTHYROXINE SODIUM 50 MCG: 50 TABLET ORAL at 05:12

## 2022-12-18 RX ADMIN — IBUPROFEN 800 MG: 400 TABLET, FILM COATED ORAL at 05:12

## 2022-12-18 RX ADMIN — METOPROLOL SUCCINATE 50 MG: 50 TABLET, EXTENDED RELEASE ORAL at 10:12

## 2022-12-18 RX ADMIN — ATORVASTATIN CALCIUM 40 MG: 40 TABLET, FILM COATED ORAL at 10:12

## 2022-12-18 RX ADMIN — GABAPENTIN 300 MG: 300 CAPSULE ORAL at 10:12

## 2022-12-18 RX ADMIN — GABAPENTIN 300 MG: 300 CAPSULE ORAL at 08:12

## 2022-12-18 RX ADMIN — GABAPENTIN 300 MG: 300 CAPSULE ORAL at 03:12

## 2022-12-18 NOTE — PLAN OF CARE
Pt A&Ox4 [place] son at bedside. Pt had rectal bellding today, ABD placed changed at 1730 very minimal output. DUARTE to RLQ 15ml out today. Ambulates well. New Ostomy to LLQ no output this shift. Denies pain. Voids well family at bedside call light in reach.

## 2022-12-18 NOTE — PROGRESS NOTES
[facility]  Colorectal Surgery  Progress Note    Patient Name: Prosper Soliz  MRN: [id number]  Admission Date: 12/15/2022  Hospital Length of Stay: 3 days  Attending Physician: DANAE Jensen MD    Subjective:     Interval History: No acute events overnight. Had some nausea last night as well as reflux. This AM states nausea has improved somewhat.     Post-Op Info:  Procedure(s) (LRB):  XI ROBOTIC, PROCTECTOMY (N/A)  XI ROBOTIC, PROCTECTOMY,  ABDOMINOPERINEAL (APR) (N/A)   3 Days Post-Op      Medications:  Continuous Infusions:   dextrose 5 % and 0.45 % NaCl with KCl 20 mEq       Scheduled Meds:   acetaminophen  1,000 mg Oral Q8H    atorvastatin  40 mg Oral Daily    gabapentin  300 mg Oral TID    ibuprofen  800 mg Oral Q8H    levothyroxine  50 mcg Oral Before breakfast    metoprolol succinate  50 mg Oral Daily    mupirocin   Nasal BID     PRN Meds:   ondansetron    oxyCODONE    oxyCODONE    sodium chloride 0.9%    traMADoL        Objective:     Vital Signs (Most Recent):  Temp: 98.1 °F (36.7 °C) (12/18/22 1133)  Pulse: 97 (12/18/22 1133)  Resp: 20 (12/18/22 1133)  BP: 126/80 (12/18/22 1133)  SpO2: 98 % (12/18/22 1133) Vital Signs (24h Range):  Temp:  [97.7 °F (36.5 °C)-98.3 °F (36.8 °C)] 98.1 °F (36.7 °C)  Pulse:  [60-97] 97  Resp:  [18-20] 20  SpO2:  [94 %-98 %] 98 %  BP: (103-127)/(54-80) 126/80     Intake/Output - Last 3 Shifts         12/16 0700  12/17 0659 12/17 0700  12/18 0659 12/18 0700  12/19 0659    P.O. 360      IV Piggyback 187.7      Total Intake(mL/kg) 547.7 (7.5)      Urine (mL/kg/hr) 1550 (0.9)      Drains 75 65     Stool 0 0     Blood       Total Output 1625 65     Net -1077.3 -65            Stool Occurrence 1 x 0 x             Physical Exam  General: Alert, oriented, in no apparent distress  HEENT: Sclera anicteric, trachea midline  Lungs: Normal respiratory rate and effort on room air  Abdomen: Soft, nontender, nondistended. Incisions clean/dry/intact without erythema or drainage. DUARTE drains  serosanguinous. Perineal wound intact.  Extremities: Warm, well perfused, no edema, SCDs in place  Neuro: Grossly intact, moves all extremities  Psych: Appropriate affect    Significant Labs:  BMP:   Recent Labs   Lab 12/18/22  0440         K 3.7      CO2 25   BUN 9   CREATININE 0.7   CALCIUM 8.3*   MG 2.0     CBC:   Recent Labs   Lab 12/18/22  0440   WBC 5.81   RBC 2.92*   HGB 9.4*   HCT 27.0*   PLT SEE COMMENT   MCV 93   MCH 32.2*   MCHC 34.8       Significant Diagnostics:  None    Assessment/Plan:     Active Diagnoses:    Diagnosis Date Noted POA    PRINCIPAL PROBLEM:  Rectal cancer [C20] 05/28/2021 Yes      Problems Resolved During this Admission:       Prosper Soliz is a 87 y.o. year old male s/p APR 12/15, now with some nausea     - Back down to NPO/IVF for now  - Post void residual, pt incontinent of urine  - Ostomy teaching  - Monitor BF    JOÃO DOTY MD  Colorectal Surgery  [facility]

## 2022-12-19 LAB
BASOPHILS # BLD AUTO: 0.03 K/UL (ref 0–0.2)
BASOPHILS NFR BLD: 0.8 % (ref 0–1.9)
BLD PROD TYP BPU: NORMAL
BLD PROD TYP BPU: NORMAL
BLOOD UNIT EXPIRATION DATE: NORMAL
BLOOD UNIT EXPIRATION DATE: NORMAL
BLOOD UNIT TYPE CODE: 5100
BLOOD UNIT TYPE CODE: 5100
BLOOD UNIT TYPE: NORMAL
BLOOD UNIT TYPE: NORMAL
CODING SYSTEM: NORMAL
CODING SYSTEM: NORMAL
CRP SERPL-MCNC: 102.9 MG/L (ref 0–8.2)
DIFFERENTIAL METHOD: ABNORMAL
DISPENSE STATUS: NORMAL
DISPENSE STATUS: NORMAL
EOSINOPHIL # BLD AUTO: 0.2 K/UL (ref 0–0.5)
EOSINOPHIL NFR BLD: 4.6 % (ref 0–8)
ERYTHROCYTE [DISTWIDTH] IN BLOOD BY AUTOMATED COUNT: 13.3 % (ref 11.5–14.5)
HCT VFR BLD AUTO: 26.3 % (ref 40–54)
HGB BLD-MCNC: 8.7 G/DL (ref 14–18)
IMM GRANULOCYTES # BLD AUTO: 0.02 K/UL (ref 0–0.04)
IMM GRANULOCYTES NFR BLD AUTO: 0.5 % (ref 0–0.5)
LYMPHOCYTES # BLD AUTO: 0.2 K/UL (ref 1–4.8)
LYMPHOCYTES NFR BLD: 5.9 % (ref 18–48)
MCH RBC QN AUTO: 31.6 PG (ref 27–31)
MCHC RBC AUTO-ENTMCNC: 33.1 G/DL (ref 32–36)
MCV RBC AUTO: 96 FL (ref 82–98)
MONOCYTES # BLD AUTO: 0.6 K/UL (ref 0.3–1)
MONOCYTES NFR BLD: 14.1 % (ref 4–15)
NEUTROPHILS # BLD AUTO: 2.9 K/UL (ref 1.8–7.7)
NEUTROPHILS NFR BLD: 74.1 % (ref 38–73)
NRBC BLD-RTO: 0 /100 WBC
NUM UNITS TRANS PACKED RBC: NORMAL
NUM UNITS TRANS PACKED RBC: NORMAL
PLATELET # BLD AUTO: 44 K/UL (ref 150–450)
PMV BLD AUTO: 12.6 FL (ref 9.2–12.9)
RBC # BLD AUTO: 2.75 M/UL (ref 4.6–6.2)
WBC # BLD AUTO: 3.91 K/UL (ref 3.9–12.7)

## 2022-12-19 PROCEDURE — 86140 C-REACTIVE PROTEIN: CPT | Performed by: STUDENT IN AN ORGANIZED HEALTH CARE EDUCATION/TRAINING PROGRAM

## 2022-12-19 PROCEDURE — 36415 COLL VENOUS BLD VENIPUNCTURE: CPT | Performed by: STUDENT IN AN ORGANIZED HEALTH CARE EDUCATION/TRAINING PROGRAM

## 2022-12-19 PROCEDURE — 85025 COMPLETE CBC W/AUTO DIFF WBC: CPT | Performed by: STUDENT IN AN ORGANIZED HEALTH CARE EDUCATION/TRAINING PROGRAM

## 2022-12-19 PROCEDURE — 97116 GAIT TRAINING THERAPY: CPT | Mod: CQ

## 2022-12-19 PROCEDURE — 25000003 PHARM REV CODE 250: Performed by: STUDENT IN AN ORGANIZED HEALTH CARE EDUCATION/TRAINING PROGRAM

## 2022-12-19 PROCEDURE — 63600175 PHARM REV CODE 636 W HCPCS: Performed by: STUDENT IN AN ORGANIZED HEALTH CARE EDUCATION/TRAINING PROGRAM

## 2022-12-19 PROCEDURE — 20600001 HC STEP DOWN PRIVATE ROOM

## 2022-12-19 RX ADMIN — MUPIROCIN: 20 OINTMENT TOPICAL at 09:12

## 2022-12-19 RX ADMIN — GABAPENTIN 300 MG: 300 CAPSULE ORAL at 09:12

## 2022-12-19 RX ADMIN — DEXTROSE MONOHYDRATE, SODIUM CHLORIDE, AND POTASSIUM CHLORIDE: 50; 4.5; 1.49 INJECTION, SOLUTION INTRAVENOUS at 05:12

## 2022-12-19 RX ADMIN — ACETAMINOPHEN 1000 MG: 500 TABLET ORAL at 05:12

## 2022-12-19 RX ADMIN — ACETAMINOPHEN 1000 MG: 500 TABLET ORAL at 09:12

## 2022-12-19 RX ADMIN — LEVOTHYROXINE SODIUM 50 MCG: 50 TABLET ORAL at 05:12

## 2022-12-19 RX ADMIN — ACETAMINOPHEN 1000 MG: 500 TABLET ORAL at 01:12

## 2022-12-19 RX ADMIN — IBUPROFEN 800 MG: 400 TABLET, FILM COATED ORAL at 05:12

## 2022-12-19 RX ADMIN — IBUPROFEN 800 MG: 400 TABLET, FILM COATED ORAL at 09:12

## 2022-12-19 RX ADMIN — GABAPENTIN 300 MG: 300 CAPSULE ORAL at 03:12

## 2022-12-19 RX ADMIN — ATORVASTATIN CALCIUM 40 MG: 40 TABLET, FILM COATED ORAL at 09:12

## 2022-12-19 RX ADMIN — IBUPROFEN 800 MG: 400 TABLET, FILM COATED ORAL at 01:12

## 2022-12-19 NOTE — SUBJECTIVE & OBJECTIVE
Subjective:     Interval History: no acute events overnite, jamir diet, colosotmy soft brown stool    Post-Op Info:  Procedure(s) (LRB):  XI ROBOTIC, PROCTECTOMY (N/A)  XI ROBOTIC, PROCTECTOMY,  ABDOMINOPERINEAL (APR) (N/A)   4 Days Post-Op      Medications:  Continuous Infusions:   dextrose 5 % and 0.45 % NaCl with KCl 20 mEq 100 mL/hr at 12/19/22 0544     Scheduled Meds:   acetaminophen  1,000 mg Oral Q8H    atorvastatin  40 mg Oral Daily    gabapentin  300 mg Oral TID    ibuprofen  800 mg Oral Q8H    levothyroxine  50 mcg Oral Before breakfast    metoprolol succinate  50 mg Oral Daily    mupirocin   Nasal BID     PRN Meds:   ondansetron    oxyCODONE    oxyCODONE    sodium chloride 0.9%    traMADoL        Objective:     Vital Signs (Most Recent):  Temp: 97.7 °F (36.5 °C) (12/19/22 1153)  Pulse: 60 (12/19/22 1153)  Resp: 19 (12/19/22 1153)  BP: (!) 104/51 (12/19/22 1153)  SpO2: 97 % (12/19/22 1153)   Vital Signs (24h Range):  Temp:  [97.2 °F (36.2 °C)-97.9 °F (36.6 °C)] 97.7 °F (36.5 °C)  Pulse:  [54-98] 60  Resp:  [16-20] 19  SpO2:  [95 %-99 %] 97 %  BP: (104-124)/(51-56) 104/51     Intake/Output - Last 3 Shifts         12/17 0700  12/18 0659 12/18 0700  12/19 0659 12/19 0700  12/20 0659    P.O.       IV Piggyback       Total Intake(mL/kg)       Urine (mL/kg/hr)  0 (0)     Drains 65 195 30    Stool 0 250     Total Output 65 445 30    Net -65 -445 -30           Urine Occurrence  4 x     Stool Occurrence 0 x 1 x             Physical Exam  Vitals and nursing note reviewed.   Constitutional:       Appearance: He is well-developed.   Cardiovascular:      Rate and Rhythm: Normal rate and regular rhythm.      Heart sounds: Normal heart sounds.   Pulmonary:      Effort: Pulmonary effort is normal. No respiratory distress.      Breath sounds: Normal breath sounds. No wheezing or rales.   Abdominal:      General: There is no distension.      Palpations: Abdomen is soft. There is no mass.      Tenderness: There is no  abdominal tenderness. There is no guarding.      Comments: Abd inc line healing well  Perineal inc line healing well  Colsotomy funcitonal  DUARTE serous drainage    Musculoskeletal:         General: Normal range of motion.   Skin:     General: Skin is warm and dry.   Neurological:      Mental Status: He is alert and oriented to person, place, and time.   Psychiatric:         Behavior: Behavior normal.         Thought Content: Thought content normal.         Judgment: Judgment normal.           Significant Labs:  BMP (Last 3 Results):   Recent Labs   Lab 12/16/22 0457 12/18/22  0440   * 106    138   K 3.8 3.7    106   CO2 23 25   BUN 10 9   CREATININE 0.7 0.7   CALCIUM 7.8* 8.3*   MG 1.8 2.0     CBC (Last 3 Results):   Recent Labs   Lab 12/16/22 0457 12/18/22  0440 12/19/22  0500   WBC 7.76 5.81 3.91   RBC 3.64* 2.92* 2.75*   HGB 11.4* 9.4* 8.7*   HCT 34.2* 27.0* 26.3*   PLT 55* SEE COMMENT 44*   MCV 94 93 96   MCH 31.3* 32.2* 31.6*   MCHC 33.3 34.8 33.1       Significant Diagnostics:  None

## 2022-12-19 NOTE — PLAN OF CARE
12/19/22 1406   Post-Acute Status   Post-Acute Authorization Home Health   Home Health Status Referrals Sent   Discharge Delays None known at this time   Discharge Plan   Discharge Plan A Home Health     Referral sent to [facility].  CM will continue to monitor.    ANURAG DavisN, BS, RN, CCM

## 2022-12-19 NOTE — PLAN OF CARE
[facility]      HOME HEALTH ORDERS  FACE TO FACE ENCOUNTER    Patient Name: Prosper Soliz  YOB: 1935    PCP: Julien Stevenson NP   PCP Address: [address] [place] [address] [address]RD FLO[address] [facility] [address] [place] *  PCP Phone Number: [phone]  PCP Fax: [phone]    Encounter Date: 11/7/22    Admit to Home Health    Diagnoses:  Active Hospital Problems    Diagnosis  POA    *Rectal cancer [C20]  Yes    Primary hypertension [I10]  Yes      Resolved Hospital Problems   No resolved problems to display.       Follow Up Appointments:  Future Appointments   Date Time Provider Department Center   12/28/2022  9:00 AM DANAE Jensen MD [facility] COLSURG [facility]   12/28/2022 11:00 AM BUNNY Mock [facility] ENTERO [facility]   1/13/2023 10:00 AM Nusrat Bar MD [facility] SOL HAYNES   2/20/2023  2:00 PM CHAIR 08 [facility] LONG [facility] Raji HAYNES       Allergies:Review of patient's allergies indicates:  No Known Allergies    Medications: Review discharge medications with patient and family and provide education.    Current Facility-Administered Medications   Medication Dose Route Frequency Provider Last Rate Last Admin    acetaminophen tablet 1,000 mg  1,000 mg Oral Q8H Daisy Schuster MD   1,000 mg at 12/19/22 1334    atorvastatin tablet 40 mg  40 mg Oral Daily Daisy Schuster MD   40 mg at 12/19/22 0921    dextrose 5 % and 0.45 % NaCl with KCl 20 mEq infusion   Intravenous Continuous Nahomy Mccray  mL/hr at 12/19/22 0544 New Bag at 12/19/22 0544    gabapentin capsule 300 mg  300 mg Oral TID Daisy Schuster MD   300 mg at 12/19/22 0921    ibuprofen tablet 800 mg  800 mg Oral Q8H Daisy Schuster MD   800 mg at 12/19/22 1333    levothyroxine tablet 50 mcg  50 mcg Oral Before breakfast Daisy Schuster MD   50 mcg at 12/19/22 0543    metoprolol succinate (TOPROL-XL) 24 hr tablet 50 mg  50 mg Oral Daily Daisy Schuster MD   50 mg at 12/18/22 1013    mupirocin 2 % ointment   Nasal BID Daisy Schuster  MD   Given at 12/19/22 0921    ondansetron injection 4 mg  4 mg Intravenous Q12H PRN Daisy Schuster MD   4 mg at 12/17/22 1758    oxyCODONE immediate release tablet 5 mg  5 mg Oral Q6H PRN Daisy Schuster MD   5 mg at 12/16/22 0039    oxyCODONE immediate release tablet Tab 10 mg  10 mg Oral Q4H PRN Daisy Schuster MD        sodium chloride 0.9% flush 10 mL  10 mL Intra-Catheter PRN Daisy Schuster MD        traMADoL tablet 50 mg  50 mg Oral Q6H PRN Daisy Schuster MD   50 mg at 12/16/22 0137     Current Discharge Medication List        CONTINUE these medications which have NOT CHANGED    Details   amLODIPine (NORVASC) 5 MG tablet Take 5 mg by mouth once daily.      atorvastatin (LIPITOR) 40 MG tablet atorvastatin 40 mg tablet   TAKE 1 TABLET BY MOUTH EVERY DAY IN THE EVENING      ciprofloxacin HCl (CIPRO) 500 MG tablet Take 1 tablet (500 mg total) by mouth 2 (two) times daily.  Qty: 2 tablet, Refills: 0      ergocalciferol (ERGOCALCIFEROL) 50,000 unit Cap Take 50,000 Units by mouth every 7 days.      levothyroxine (SYNTHROID) 50 MCG tablet Synthroid 50 mcg tablet   TAKE 1 TABLET BY MOUTH EVERY DAY      LIDOcaine-prilocaine (EMLA) cream Apply topically as needed. To port site 30 minutes prior to port access.  Qty: 30 g, Refills: 2    Associated Diagnoses: Rectal cancer      metoprolol succinate (TOPROL-XL) 50 MG 24 hr tablet Take 50 mg by mouth once daily.      metroNIDAZOLE (FLAGYL) 500 MG tablet Take 1 tablet (500 mg total) by mouth 2 (two) times daily.  Qty: 2 tablet, Refills: 0      olmesartan (BENICAR) 40 MG tablet Take 40 mg by mouth once daily.      potassium chloride (K-TAB) 20 mEq TAKE 1 TABLET BY MOUTH ONCE DAILY  Qty: 90 tablet, Refills: 4    Associated Diagnoses: Hypokalemia      zolpidem (AMBIEN) 10 mg Tab Take 10 mg by mouth every evening.      aspirin (ECOTRIN) 81 MG EC tablet Take 81 mg by mouth once daily.      promethazine (PHENERGAN) 12.5 MG Tab TAKE 1 TABLET BY MOUTH EVERY 6 HOURS AS NEEDED  FOR NAUSEA  Qty: 30 tablet, Refills: 2    Associated Diagnoses: Rectal cancer               I have seen and examined this patient within the last 30 days. My clinical findings that support the need for the home health skilled services and home bound status are the following:no   Weakness/numbness causing balance and gait disturbance due to Surgery making it taxing to leave home.     Diet:   regular diet    Labs:  na    Referrals/ Consults  Physical Therapy to evaluate and treat. Evaluate for home safety and equipment needs; Establish/upgrade home exercise program. Perform / instruct on therapeutic exercises, gait training, transfer training, and Range of Motion.  Occupational Therapy to evaluate and treat. Evaluate home environment for safety and equipment needs. Perform/Instruct on transfers, ADL training, ROM, and therapeutic exercises.   to evaluate for community resources/long-range planning.    Activities:   no strenuous exercise for 6 weeks, no prolonged sitting    Nursing:   Agency to admit patient within 24 hours of hospital discharge unless specified on physician order or at patient request    SN to complete comprehensive assessment including routine vital signs. Instruct on disease process and s/s of complications to report to MD. Review/verify medication list sent home with the patient at time of discharge  and instruct patient/caregiver as needed. Frequency may be adjusted depending on start of care date.     Skilled nurse to perform up to 3 visits PRN for symptoms related to diagnosis    Notify MD if SBP > 160 or < 90; DBP > 90 or < 50; HR > 120 or < 50; Temp > 101; O2 < 88%; Other:       Ok to schedule additional visits based on staff availability and patient request on consecutive days within the home health episode.    When multiple disciplines ordered:    Start of Care occurs on Sunday - Wednesday schedule remaining discipline evaluations as ordered on separate consecutive days  following the start of care.    Thursday SOC -schedule subsequent evaluations Friday and Monday the following week.     Friday - Saturday SOC - schedule subsequent discipline evaluations on consecutive days starting Monday of the following week.    For all post-discharge communication and subsequent orders please contact patient's primary care physician. If unable to reach primary care physician or do not receive response within 30 minutes, please contact Dr. Jensen for clinical staff order clarification    Miscellaneous   Colostomy Care:  Instruct patient/caregiver to empty bag when full and PRN., Change and clean site every 48 hours, and Monitor skin integrity.      Nursing Three times weekly, Physical Therapy Three times weekly, and Occupational Therapy Three times weekly  May shower, no tub bath  Wound Care Orders  yes:  Surgical Wound:  Location: abd and perineal    Consult ET nurse        Keep durga dn perineal wound clean and dry  DUARTE drain:  keep insertion site clean and dry   I certify that this patient is confined to his home and needs intermittent skilled nursing care, physical therapy, and occupational therapy.

## 2022-12-19 NOTE — ASSESSMENT & PLAN NOTE
OR 12/16 APR    -await return of bowel function, red diet  -continue multi modality for pain control  -encourage ambulation and use of IS  -domingo hose and SCD  -prophalactic lovenox and PPI  -voiding  -consult wound care, appreciate help

## 2022-12-19 NOTE — PLAN OF CARE
End of Shift:    Patient in bed watching television. Family at bedside. Visualized chest rise and fall.   No PRNs given  No rectal bleeding.    Bed locked and in lowest position with side rails up x2. Call light and bedside table within reach.    Will continue plan of care    Problem: Infection  Goal: Absence of Infection Signs and Symptoms  Outcome: Ongoing, Progressing     Problem: Skin Injury Risk Increased  Goal: Skin Health and Integrity  Outcome: Ongoing, Progressing     Problem: Fall Injury Risk  Goal: Absence of Fall and Fall-Related Injury  Outcome: Ongoing, Progressing

## 2022-12-19 NOTE — ASSESSMENT & PLAN NOTE
Chronic, controlled.  Latest blood pressure and vitals reviewed-   Temp:  [97.2 °F (36.2 °C)-97.9 °F (36.6 °C)]   Pulse:  [54-98]   Resp:  [16-20]   BP: (104-124)/(51-56)   SpO2:  [95 %-99 %] .   Home meds for hypertension were reviewed and noted below.   Hypertension Medications             amLODIPine (NORVASC) 5 MG tablet Take 5 mg by mouth once daily.    metoprolol succinate (TOPROL-XL) 50 MG 24 hr tablet Take 50 mg by mouth once daily.    olmesartan (BENICAR) 40 MG tablet Take 40 mg by mouth once daily.          While in the hospital, will manage blood pressure as follows; Continue home antihypertensive regimen    Will utilize p.r.n. blood pressure medication only if patient's blood pressure greater than  180/110 and he develops symptoms such as worsening chest pain or shortness of breath.

## 2022-12-19 NOTE — PROGRESS NOTES
[facility]  Wound Care    Patient Name:  Prosper Soliz   MRN:  [id number]  Date: 12/19/2022  Diagnosis: Rectal cancer    History:     Past Medical History:   Diagnosis Date    Cancer     GERD without esophagitis     Hiatal hernia 05/18/2021    normal mucose in second part of the duodenum     Hypercholesterolemia     Hypertension     Hypothyroidism     Left groin hernia     Prostate cancer 1999    Rectal cancer 05/2021       Social History     Socioeconomic History    Marital status:    Tobacco Use    Smoking status: Never    Smokeless tobacco: Never   Substance and Sexual Activity    Alcohol use: Not Currently    Drug use: Never    Sexual activity: Not Currently       Precautions:     Allergies as of 11/07/2022    (No Known Allergies)       [facility] Assessment Details/Treatment     Patient seen for new ostomy consult. Initial ostomy education begun.  Goals of education include:    Pouch emptying, sizing/cuting pouch, and applying pouch (demonstration  Stoma and hemal-stomal care (discussed  Food choices and hydration (sheets provided and discussed  Obtaining supplies( will order prior to discharge, discussed FU with Elena in two weeks    Discussed and demonstrated cutting ostomy pouch and emptying pouch of stool and gas.   Discussed meal planning with particular foods to avoid.  Discussed importance of hydration and increasing fluid intake.  Explained process of ordering supplies to be delivered to patient's home. Provided reading materials regarding today's training and will follow up with patient tomorrow.  Offered patient to have any friend or family to attend next training session. Brought patient supplies and ostomy options. Will Fu           12/19/22 0900   WOCN Assessment   WOCN Total Time (mins) 25   Visit Date 12/19/22   Visit Time 0900   Consult Type New   WOCN Speciality Ostomy   WOCN List colostomy   Intervention assessed;chart review;coordination of care;orders   Teaching on-going        Colostomy  12/15/22 2300 LUQ   Placement Date/Time: 12/15/22 2300   Present Prior to Hospital Arrival?: No  Location: LUQ   Stomal Appliance 1 piece;Dry;Intact   Stoma Appearance rosebud appearance;moist;round;pink;red   Stoma Function flatus;stool         12/19/2022

## 2022-12-19 NOTE — PROGRESS NOTES
[facility]  Colorectal Surgery  Progress Note    Patient Name: Prosper Soliz  MRN: [id number]  Admission Date: 12/15/2022  Hospital Length of Stay: 4 days  Attending Physician: DANAE Jensen MD    Subjective:     Interval History: no acute events overnite, jamir diet, colosotmy soft brown stool    Post-Op Info:  Procedure(s) (LRB):  XI ROBOTIC, PROCTECTOMY (N/A)  XI ROBOTIC, PROCTECTOMY,  ABDOMINOPERINEAL (APR) (N/A)   4 Days Post-Op      Medications:  Continuous Infusions:   dextrose 5 % and 0.45 % NaCl with KCl 20 mEq 100 mL/hr at 12/19/22 0544     Scheduled Meds:   acetaminophen  1,000 mg Oral Q8H    atorvastatin  40 mg Oral Daily    gabapentin  300 mg Oral TID    ibuprofen  800 mg Oral Q8H    levothyroxine  50 mcg Oral Before breakfast    metoprolol succinate  50 mg Oral Daily    mupirocin   Nasal BID     PRN Meds:   ondansetron    oxyCODONE    oxyCODONE    sodium chloride 0.9%    traMADoL        Objective:     Vital Signs (Most Recent):  Temp: 97.7 °F (36.5 °C) (12/19/22 1153)  Pulse: 60 (12/19/22 1153)  Resp: 19 (12/19/22 1153)  BP: (!) 104/51 (12/19/22 1153)  SpO2: 97 % (12/19/22 1153)   Vital Signs (24h Range):  Temp:  [97.2 °F (36.2 °C)-97.9 °F (36.6 °C)] 97.7 °F (36.5 °C)  Pulse:  [54-98] 60  Resp:  [16-20] 19  SpO2:  [95 %-99 %] 97 %  BP: (104-124)/(51-56) 104/51     Intake/Output - Last 3 Shifts         12/17 0700  12/18 0659 12/18 0700  12/19 0659 12/19 0700  12/20 0659    P.O.       IV Piggyback       Total Intake(mL/kg)       Urine (mL/kg/hr)  0 (0)     Drains 65 195 30    Stool 0 250     Total Output 65 445 30    Net -65 -445 -30           Urine Occurrence  4 x     Stool Occurrence 0 x 1 x             Physical Exam  Vitals and nursing note reviewed.   Constitutional:       Appearance: He is well-developed.   Cardiovascular:      Rate and Rhythm: Normal rate and regular rhythm.      Heart sounds: Normal heart sounds.   Pulmonary:      Effort: Pulmonary effort is normal. No respiratory  distress.      Breath sounds: Normal breath sounds. No wheezing or rales.   Abdominal:      General: There is no distension.      Palpations: Abdomen is soft. There is no mass.      Tenderness: There is no abdominal tenderness. There is no guarding.      Comments: Abd inc line healing well  Perineal inc line healing well  Colsotomy funcitonal  DUARTE serous drainage    Musculoskeletal:         General: Normal range of motion.   Skin:     General: Skin is warm and dry.   Neurological:      Mental Status: He is alert and oriented to person, place, and time.   Psychiatric:         Behavior: Behavior normal.         Thought Content: Thought content normal.         Judgment: Judgment normal.           Significant Labs:  BMP (Last 3 Results):   Recent Labs   Lab 12/16/22  0457 12/18/22  0440   * 106    138   K 3.8 3.7    106   CO2 23 25   BUN 10 9   CREATININE 0.7 0.7   CALCIUM 7.8* 8.3*   MG 1.8 2.0     CBC (Last 3 Results):   Recent Labs   Lab 12/16/22  0457 12/18/22  0440 12/19/22  0500   WBC 7.76 5.81 3.91   RBC 3.64* 2.92* 2.75*   HGB 11.4* 9.4* 8.7*   HCT 34.2* 27.0* 26.3*   PLT 55* SEE COMMENT 44*   MCV 94 93 96   MCH 31.3* 32.2* 31.6*   MCHC 33.3 34.8 33.1       Significant Diagnostics:  None    Assessment/Plan:     * Rectal cancer  OR 12/16 APR    -await return of bowel function, red diet  -continue multi modality for pain control  -encourage ambulation and use of IS  -domingo hose and SCD  -prophalactic lovenox and PPI  -voiding  -consult wound care, appreciate help    Primary hypertension  Chronic, controlled.  Latest blood pressure and vitals reviewed-   Temp:  [97.2 °F (36.2 °C)-97.9 °F (36.6 °C)]   Pulse:  [54-98]   Resp:  [16-20]   BP: (104-124)/(51-56)   SpO2:  [95 %-99 %] .   Home meds for hypertension were reviewed and noted below.   Hypertension Medications             amLODIPine (NORVASC) 5 MG tablet Take 5 mg by mouth once daily.    metoprolol succinate (TOPROL-XL) 50 MG 24 hr tablet Take  50 mg by mouth once daily.    olmesartan (BENICAR) 40 MG tablet Take 40 mg by mouth once daily.          While in the hospital, will manage blood pressure as follows; Continue home antihypertensive regimen    Will utilize p.r.n. blood pressure medication only if patient's blood pressure greater than  180/110 and he develops symptoms such as worsening chest pain or shortness of breath.          Cynthia Welsh NP  Colorectal Surgery  [facility]

## 2022-12-19 NOTE — PT/OT/SLP PROGRESS
"Physical Therapy Treatment    Patient Name:  Prosper Soliz   MRN:  [id number]  Admitting Diagnosis: Rectal cancer  Recent Surgery: Procedure(s) (LRB):  XI ROBOTIC, PROCTECTOMY (N/A)  XI ROBOTIC, PROCTECTOMY,  ABDOMINOPERINEAL (APR) (N/A) 4 Days Post-Op    Recommendations:     Discharge Recommendations:  other (see comments)   Discharge Equipment Recommendations: none   Barriers to discharge: Inaccessible home and Decreased caregiver support Evolving clinical presentation    Plan:     During this hospitalization, patient to be seen 3 x/week to address the above listed problems via gait training, therapeutic activities, therapeutic exercises, neuromuscular re-education  Plan of Care Expires:  01/15/23  Plan of Care Reviewed with: patient, son    This Plan of care has been discussed with the patient who was involved in its development and understands and is in agreement with the identified goals and treatment plan    Subjective     Communicated with nurse (Anastasia) prior to session.     Patient comments: "I lost my sense of sensation in my fingertips and feet after chemotherapy"  Pain/Comfort:  Pain Rating 1: 0/10  Pain Rating Post-Intervention 1: 0/10    Objective:     2 attempts for tx session 2* pt reports "I'm going to use the restroom first"  pt reports he'll be staying over at his other son's house for a month upon discharge "He doesn't have stairs"    Patient found with: colostomy, DUARTE drain    Patient found walking out of restroom with assistance from nurse and son upon PT entry to room, agreeable to treatment.  Son present in the room.    Respiratory Status: Room air    General Precautions: Standard, fall   Orthopedic Precautions:N/A   Braces: N/A       BED MOBILITY        N/O 2* pt found and left standing in room     GAIT: in hallway   Patient ambulated: 400ft   Patient required: sup for safety   Patient used:  No Assistive Device   Gait Pattern observed: reciprocal gait   Gait Deviation(s):  mild instability, but " no overt LOB     Comments: vc's to look forward      STAIRS  Pt ascended/descended  3 stair(s) with No Assistive Device with single HR, 3 stairs with no AD/no HR's, and 3 stairs with single HHA and no HR's with Stand-by Assistance with vc's for Sequencing of LE's, hand placement, speed of task and safety.     EDUCATION  Patient provided with daily orientation and goals of this PT session. They were educated to call for assistance and to transfer with hospital staff only.  Also, pt was educated on the effects of prolonged immobility and the importance of performing OOB activity and exercises to promote healing and reduce recovery time    Whiteboard updated with correct mobility information. RN/PCT notified.  Pt safe to amb with RN/PCT or family: Use no  AD with sup.    Patient left  standing in room , with  all lines intact, call button in reach, and son present    AM-PAC 6 CLICK MOBILITY  Turning over in bed (including adjusting bedclothes, sheets and blankets)?: 3  Sitting down on and standing up from a chair with arms (e.g., wheelchair, bedside commode, etc.): 3  Moving from lying on back to sitting on the side of the bed?: 3  Moving to and from a bed to a chair (including a wheelchair)?: 3  Need to walk in hospital room?: 3  Climbing 3-5 steps with a railing?: 3  Basic Mobility Total Score: 18     Assessment:     Prosper Soliz is a 87 y.o. male admitted with a medical diagnosis of Rectal cancer.  He presents with the following impairments/functional limitations:  weakness, impaired sensation, gait instability, decreased coordination, decreased upper extremity function, decreased lower extremity function, impaired coordination. requiring light assistance and verbal cues for gait and stairs to prevent falls due to instability and decreased proprioception.    Pt remains motivated to participate in PT session and will cont to benefit from skilled PT intervention.    Rehab Prognosis:  Good; patient would benefit from  acute skilled PT services to address these deficits and reach maximum level of function.      GOALS:   Multidisciplinary Problems       Physical Therapy Goals          Problem: Physical Therapy    Goal Priority Disciplines Outcome Goal Variances Interventions   Physical Therapy Goal     PT, PT/OT Ongoing, Progressing     Description: Goals to met by 12/30/2022    1. Supine to sit with Modified Murray  2. Sit to supine with Modified Murray  3. Rolling to Left and Right with Modified Murray.  4. Sit to stand transfer with Murray  5. Bed to chair transfer with Murray using No Assistive Device  6. Gait  x 250 feet with Murray using No Assistive Device   7. Ascend/descend 3 stair(s) with no Handrails Murray using No Assistive Device.   8. Lower extremity exercise program x15 reps per Instruction, with assistance as needed in order to facilitate improved postural control and improvement in functional independence                         Time Tracking:     PT Received On: 12/19/22  PT Start Time: 1511     PT Stop Time: 1530  PT Total Time (min): 19 min     Billable Minutes: Gait Training 19    Treatment Type: Treatment  PT/PTA: PTA     PTA Visit Number: 1       AURELIA Beasley.  Pager [phone]    12/19/2022    .

## 2022-12-20 LAB — CRP SERPL-MCNC: 72.8 MG/L (ref 0–8.2)

## 2022-12-20 PROCEDURE — 25000003 PHARM REV CODE 250: Performed by: STUDENT IN AN ORGANIZED HEALTH CARE EDUCATION/TRAINING PROGRAM

## 2022-12-20 PROCEDURE — 20600001 HC STEP DOWN PRIVATE ROOM

## 2022-12-20 PROCEDURE — 36415 COLL VENOUS BLD VENIPUNCTURE: CPT | Performed by: STUDENT IN AN ORGANIZED HEALTH CARE EDUCATION/TRAINING PROGRAM

## 2022-12-20 PROCEDURE — 86140 C-REACTIVE PROTEIN: CPT | Performed by: STUDENT IN AN ORGANIZED HEALTH CARE EDUCATION/TRAINING PROGRAM

## 2022-12-20 RX ORDER — AMLODIPINE BESYLATE 5 MG/1
5 TABLET ORAL DAILY
Status: DISCONTINUED | OUTPATIENT
Start: 2022-12-20 | End: 2022-12-21 | Stop reason: HOSPADM

## 2022-12-20 RX ADMIN — IBUPROFEN 800 MG: 400 TABLET, FILM COATED ORAL at 06:12

## 2022-12-20 RX ADMIN — ACETAMINOPHEN 1000 MG: 500 TABLET ORAL at 03:12

## 2022-12-20 RX ADMIN — ACETAMINOPHEN 1000 MG: 500 TABLET ORAL at 06:12

## 2022-12-20 RX ADMIN — MUPIROCIN: 20 OINTMENT TOPICAL at 09:12

## 2022-12-20 RX ADMIN — ACETAMINOPHEN 1000 MG: 500 TABLET ORAL at 09:12

## 2022-12-20 RX ADMIN — GABAPENTIN 300 MG: 300 CAPSULE ORAL at 09:12

## 2022-12-20 RX ADMIN — AMLODIPINE BESYLATE 5 MG: 5 TABLET ORAL at 09:12

## 2022-12-20 RX ADMIN — IBUPROFEN 800 MG: 400 TABLET, FILM COATED ORAL at 03:12

## 2022-12-20 RX ADMIN — GABAPENTIN 300 MG: 300 CAPSULE ORAL at 03:12

## 2022-12-20 RX ADMIN — METOPROLOL SUCCINATE 50 MG: 50 TABLET, EXTENDED RELEASE ORAL at 09:12

## 2022-12-20 RX ADMIN — ATORVASTATIN CALCIUM 40 MG: 40 TABLET, FILM COATED ORAL at 09:12

## 2022-12-20 RX ADMIN — IBUPROFEN 800 MG: 400 TABLET, FILM COATED ORAL at 09:12

## 2022-12-20 RX ADMIN — LEVOTHYROXINE SODIUM 50 MCG: 50 TABLET ORAL at 06:12

## 2022-12-20 RX ADMIN — DOCUSATE SODIUM 50 MG: 50 CAPSULE, LIQUID FILLED ORAL at 09:12

## 2022-12-20 NOTE — ASSESSMENT & PLAN NOTE
OR 12/16 APR    -await return of bowel function, reg diet  -continue multi modality for pain control  -encourage ambulation and use of IS  -domingo hose and SCD  -prophalactic lovenox and PPI  -voiding  -consult wound care, appreciate help    Delivery

## 2022-12-20 NOTE — PLAN OF CARE
[facility]  Discharge Reassessment    Primary Care Provider: Julien Stevenson NP    Expected Discharge Date: 12/20/2022    Reassessment (most recent)       Discharge Reassessment - 12/20/22 1308          Discharge Reassessment    Assessment Type Discharge Planning Reassessment     Discharge Plan A Home Health     Discharge Plan B Home     DME Needed Upon Discharge  colostomy/ostomy supplies     Discharge Barriers Identified None     Why the patient remains in the hospital Requires continued medical care                   Keke Gurrola RN, CM   Ext: [phone]

## 2022-12-20 NOTE — PROGRESS NOTES
[facility]  Colorectal Surgery  Progress Note    Patient Name: Prosper Soliz  MRN: [id number]  Admission Date: 12/15/2022  Hospital Length of Stay: 5 days  Attending Physician: DANAE Jensen MD    Subjective:     Interval History: no acute events overnite, no output from stoma for 24 hours, no n/v    Post-Op Info:  Procedure(s) (LRB):  XI ROBOTIC, PROCTECTOMY (N/A)  XI ROBOTIC, PROCTECTOMY,  ABDOMINOPERINEAL (APR) (N/A)   5 Days Post-Op      Medications:  Continuous Infusions:  Scheduled Meds:   acetaminophen  1,000 mg Oral Q8H    amLODIPine  5 mg Oral Daily    atorvastatin  40 mg Oral Daily    docusate sodium  50 mg Oral Daily    gabapentin  300 mg Oral TID    ibuprofen  800 mg Oral Q8H    levothyroxine  50 mcg Oral Before breakfast    metoprolol succinate  50 mg Oral Daily    mupirocin   Nasal BID     PRN Meds:   ondansetron    oxyCODONE    oxyCODONE    sodium chloride 0.9%    traMADoL        Objective:     Vital Signs (Most Recent):  Temp: 98.6 °F (37 °C) (12/20/22 0759)  Pulse: 71 (12/20/22 0914)  Resp: 20 (12/20/22 0759)  BP: (!) 126/58 (12/20/22 0914)  SpO2: (!) 94 % (12/20/22 0759)   Vital Signs (24h Range):  Temp:  [97.4 °F (36.3 °C)-98.6 °F (37 °C)] 98.6 °F (37 °C)  Pulse:  [60-71] 71  Resp:  [16-20] 20  SpO2:  [94 %-99 %] 94 %  BP: (104-126)/(51-64) 126/58     Intake/Output - Last 3 Shifts         12/18 0700  12/19 0659 12/19 0700 12/20 0659 12/20 0700  12/21 0659    P.O.   480    I.V. (mL/kg)   0 (0)    Total Intake(mL/kg)   480 (6.6)    Urine (mL/kg/hr) 0 (0)      Drains 195 140     Stool 250      Total Output 445 140     Net -445 -140 +480           Urine Occurrence 4 x  2 x    Stool Occurrence 1 x              Physical Exam  Vitals and nursing note reviewed.   Constitutional:       Appearance: He is well-developed.   Cardiovascular:      Rate and Rhythm: Normal rate and regular rhythm.      Heart sounds: Normal heart sounds.   Pulmonary:      Effort: Pulmonary effort is normal. No  respiratory distress.      Breath sounds: Normal breath sounds. No wheezing or rales.   Abdominal:      General: There is no distension.      Palpations: Abdomen is soft. There is no mass.      Tenderness: There is no abdominal tenderness. There is no guarding.      Comments: Abd inc line healing well  Perineal wound healing well  DUARTE serous drainage  Colostomy stoma pink and viable, no output   Musculoskeletal:         General: Normal range of motion.   Skin:     General: Skin is warm and dry.   Neurological:      Mental Status: He is alert and oriented to person, place, and time.   Psychiatric:         Behavior: Behavior normal.         Thought Content: Thought content normal.         Judgment: Judgment normal.       Significant Labs:  BMP (Last 3 Results):   Recent Labs   Lab 12/16/22  0457 12/18/22  0440   * 106    138   K 3.8 3.7    106   CO2 23 25   BUN 10 9   CREATININE 0.7 0.7   CALCIUM 7.8* 8.3*   MG 1.8 2.0     CBC (Last 3 Results):   Recent Labs   Lab 12/16/22 0457 12/18/22  0440 12/19/22  0500   WBC 7.76 5.81 3.91   RBC 3.64* 2.92* 2.75*   HGB 11.4* 9.4* 8.7*   HCT 34.2* 27.0* 26.3*   PLT 55* SEE COMMENT 44*   MCV 94 93 96   MCH 31.3* 32.2* 31.6*   MCHC 33.3 34.8 33.1       Significant Diagnostics:  None    Assessment/Plan:     * Rectal cancer  OR 12/16 APR    -await return of bowel function, reg diet  -continue multi modality for pain control  -encourage ambulation and use of IS  -domingo hose and SCD  -prophalactic lovenox and PPI  -voiding  -consult wound care, appreciate help    History of hypothyroidism  Patient has chronic hypothyroidism. TFTs reviewed- No results found for: TSH. Will continue chronic levothyroxine and adjust for and clinical changes.      Primary hypertension  Chronic, controlled.  Latest blood pressure and vitals reviewed-   Temp:  [97.4 °F (36.3 °C)-98.6 °F (37 °C)]   Pulse:  [60-71]   Resp:  [16-20]   BP: (104-126)/(51-64)   SpO2:  [94 %-99 %] .   Home meds for  hypertension were reviewed and noted below.   Hypertension Medications             amLODIPine (NORVASC) 5 MG tablet Take 5 mg by mouth once daily.    metoprolol succinate (TOPROL-XL) 50 MG 24 hr tablet Take 50 mg by mouth once daily.    olmesartan (BENICAR) 40 MG tablet Take 40 mg by mouth once daily.          While in the hospital, will manage blood pressure as follows; Continue home antihypertensive regimen    Will utilize p.r.n. blood pressure medication only if patient's blood pressure greater than  180/110 and he develops symptoms such as worsening chest pain or shortness of breath.    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

## 2022-12-20 NOTE — PLAN OF CARE
Pt resting comfortably. Denies any current pain. Family at bedside. No PRNS given. Call light within reach.   Problem: Infection  Goal: Absence of Infection Signs and Symptoms  Outcome: Ongoing, Progressing     Problem: Adult Inpatient Plan of Care  Goal: Plan of Care Review  Outcome: Ongoing, Progressing  Goal: Patient-Specific Goal (Individualized)  Outcome: Ongoing, Progressing  Goal: Absence of Hospital-Acquired Illness or Injury  Outcome: Ongoing, Progressing  Goal: Readiness for Transition of Care  Outcome: Ongoing, Progressing     Problem: Skin Injury Risk Increased  Goal: Skin Health and Integrity  Outcome: Ongoing, Progressing     Problem: Fall Injury Risk  Goal: Absence of Fall and Fall-Related Injury  Outcome: Ongoing, Progressing

## 2022-12-20 NOTE — ASSESSMENT & PLAN NOTE
Patient has chronic hypothyroidism. TFTs reviewed- No results found for: TSH. Will continue chronic levothyroxine and adjust for and clinical changes.

## 2022-12-20 NOTE — SUBJECTIVE & OBJECTIVE
Subjective:     Interval History: no acute events overnite, no output from stoma for 24 hours, no n/v    Post-Op Info:  Procedure(s) (LRB):  XI ROBOTIC, PROCTECTOMY (N/A)  XI ROBOTIC, PROCTECTOMY,  ABDOMINOPERINEAL (APR) (N/A)   5 Days Post-Op      Medications:  Continuous Infusions:  Scheduled Meds:   acetaminophen  1,000 mg Oral Q8H    amLODIPine  5 mg Oral Daily    atorvastatin  40 mg Oral Daily    docusate sodium  50 mg Oral Daily    gabapentin  300 mg Oral TID    ibuprofen  800 mg Oral Q8H    levothyroxine  50 mcg Oral Before breakfast    metoprolol succinate  50 mg Oral Daily    mupirocin   Nasal BID     PRN Meds:   ondansetron    oxyCODONE    oxyCODONE    sodium chloride 0.9%    traMADoL        Objective:     Vital Signs (Most Recent):  Temp: 98.6 °F (37 °C) (12/20/22 0759)  Pulse: 71 (12/20/22 0914)  Resp: 20 (12/20/22 0759)  BP: (!) 126/58 (12/20/22 0914)  SpO2: (!) 94 % (12/20/22 0759)   Vital Signs (24h Range):  Temp:  [97.4 °F (36.3 °C)-98.6 °F (37 °C)] 98.6 °F (37 °C)  Pulse:  [60-71] 71  Resp:  [16-20] 20  SpO2:  [94 %-99 %] 94 %  BP: (104-126)/(51-64) 126/58     Intake/Output - Last 3 Shifts         12/18 0700  12/19 0659 12/19 0700  12/20 0659 12/20 0700  12/21 0659    P.O.   480    I.V. (mL/kg)   0 (0)    Total Intake(mL/kg)   480 (6.6)    Urine (mL/kg/hr) 0 (0)      Drains 195 140     Stool 250      Total Output 445 140     Net -445 -140 +480           Urine Occurrence 4 x  2 x    Stool Occurrence 1 x              Physical Exam  Vitals and nursing note reviewed.   Constitutional:       Appearance: He is well-developed.   Cardiovascular:      Rate and Rhythm: Normal rate and regular rhythm.      Heart sounds: Normal heart sounds.   Pulmonary:      Effort: Pulmonary effort is normal. No respiratory distress.      Breath sounds: Normal breath sounds. No wheezing or rales.   Abdominal:      General: There is no distension.      Palpations: Abdomen is soft. There is no mass.      Tenderness: There is  no abdominal tenderness. There is no guarding.      Comments: Abd inc line healing well  Perineal wound healing well  DUARTE serous drainage  Colostomy stoma pink and viable, no output   Musculoskeletal:         General: Normal range of motion.   Skin:     General: Skin is warm and dry.   Neurological:      Mental Status: He is alert and oriented to person, place, and time.   Psychiatric:         Behavior: Behavior normal.         Thought Content: Thought content normal.         Judgment: Judgment normal.       Significant Labs:  BMP (Last 3 Results):   Recent Labs   Lab 12/16/22 0457 12/18/22  0440   * 106    138   K 3.8 3.7    106   CO2 23 25   BUN 10 9   CREATININE 0.7 0.7   CALCIUM 7.8* 8.3*   MG 1.8 2.0     CBC (Last 3 Results):   Recent Labs   Lab 12/16/22 0457 12/18/22  0440 12/19/22  0500   WBC 7.76 5.81 3.91   RBC 3.64* 2.92* 2.75*   HGB 11.4* 9.4* 8.7*   HCT 34.2* 27.0* 26.3*   PLT 55* SEE COMMENT 44*   MCV 94 93 96   MCH 31.3* 32.2* 31.6*   MCHC 33.3 34.8 33.1       Significant Diagnostics:  None

## 2022-12-20 NOTE — ASSESSMENT & PLAN NOTE
Chronic, controlled.  Latest blood pressure and vitals reviewed-   Temp:  [97.4 °F (36.3 °C)-98.6 °F (37 °C)]   Pulse:  [60-71]   Resp:  [16-20]   BP: (104-126)/(51-64)   SpO2:  [94 %-99 %] .   Home meds for hypertension were reviewed and noted below.   Hypertension Medications             amLODIPine (NORVASC) 5 MG tablet Take 5 mg by mouth once daily.    metoprolol succinate (TOPROL-XL) 50 MG 24 hr tablet Take 50 mg by mouth once daily.    olmesartan (BENICAR) 40 MG tablet Take 40 mg by mouth once daily.          While in the hospital, will manage blood pressure as follows; Continue home antihypertensive regimen    Will utilize p.r.n. blood pressure medication only if patient's blood pressure greater than  180/110 and he develops symptoms such as worsening chest pain or shortness of breath.

## 2022-12-21 VITALS
WEIGHT: 161.38 LBS | BODY MASS INDEX: 23.1 KG/M2 | OXYGEN SATURATION: 98 % | RESPIRATION RATE: 17 BRPM | HEIGHT: 70 IN | TEMPERATURE: 97 F | HEART RATE: 68 BPM | SYSTOLIC BLOOD PRESSURE: 146 MMHG | DIASTOLIC BLOOD PRESSURE: 66 MMHG

## 2022-12-21 LAB — CRP SERPL-MCNC: 106.6 MG/L (ref 0–8.2)

## 2022-12-21 PROCEDURE — 86140 C-REACTIVE PROTEIN: CPT | Performed by: STUDENT IN AN ORGANIZED HEALTH CARE EDUCATION/TRAINING PROGRAM

## 2022-12-21 PROCEDURE — 94761 N-INVAS EAR/PLS OXIMETRY MLT: CPT

## 2022-12-21 PROCEDURE — 25000003 PHARM REV CODE 250: Performed by: STUDENT IN AN ORGANIZED HEALTH CARE EDUCATION/TRAINING PROGRAM

## 2022-12-21 PROCEDURE — 36415 COLL VENOUS BLD VENIPUNCTURE: CPT | Performed by: STUDENT IN AN ORGANIZED HEALTH CARE EDUCATION/TRAINING PROGRAM

## 2022-12-21 RX ORDER — GABAPENTIN 300 MG/1
300 CAPSULE ORAL 3 TIMES DAILY
Qty: 90 CAPSULE | Refills: 0 | Status: SHIPPED | OUTPATIENT
Start: 2022-12-21 | End: 2023-12-21

## 2022-12-21 RX ORDER — OXYCODONE HYDROCHLORIDE 10 MG/1
10 TABLET ORAL EVERY 4 HOURS PRN
Qty: 20 TABLET | Refills: 0 | Status: SHIPPED | OUTPATIENT
Start: 2022-12-21 | End: 2022-12-28 | Stop reason: SDUPTHER

## 2022-12-21 RX ADMIN — ATORVASTATIN CALCIUM 40 MG: 40 TABLET, FILM COATED ORAL at 08:12

## 2022-12-21 RX ADMIN — DOCUSATE SODIUM 50 MG: 50 CAPSULE, LIQUID FILLED ORAL at 08:12

## 2022-12-21 RX ADMIN — IBUPROFEN 800 MG: 400 TABLET, FILM COATED ORAL at 05:12

## 2022-12-21 RX ADMIN — GABAPENTIN 300 MG: 300 CAPSULE ORAL at 08:12

## 2022-12-21 RX ADMIN — METOPROLOL SUCCINATE 50 MG: 50 TABLET, EXTENDED RELEASE ORAL at 08:12

## 2022-12-21 RX ADMIN — AMLODIPINE BESYLATE 5 MG: 5 TABLET ORAL at 08:12

## 2022-12-21 RX ADMIN — LEVOTHYROXINE SODIUM 50 MCG: 50 TABLET ORAL at 05:12

## 2022-12-21 NOTE — PT/OT/SLP PROGRESS
Physical Therapy  Pt Not Seen    Patient Name:  Prosper Soliz   MRN:  [id number]    Patient not seen today secondary to  Other (Comment) (Pt being discharged from hospital today). Will follow-up on next scheduled visit if not discharged from hospital.    Nikki Ureña, PTA  12/21/2022

## 2022-12-21 NOTE — ASSESSMENT & PLAN NOTE
Chronic, controlled.  Latest blood pressure and vitals reviewed-   Temp:  [96.5 °F (35.8 °C)-99 °F (37.2 °C)]   Pulse:  [60-68]   Resp:  [16-20]   BP: (111-146)/(56-66)   SpO2:  [97 %-100 %] .   Home meds for hypertension were reviewed and noted below.   Hypertension Medications             amLODIPine (NORVASC) 5 MG tablet Take 5 mg by mouth once daily.    metoprolol succinate (TOPROL-XL) 50 MG 24 hr tablet Take 50 mg by mouth once daily.    olmesartan (BENICAR) 40 MG tablet Take 40 mg by mouth once daily.          While in the hospital, will manage blood pressure as follows; Continue home antihypertensive regimen    Will utilize p.r.n. blood pressure medication only if patient's blood pressure greater than  180/110 and he develops symptoms such as worsening chest pain or shortness of breath.

## 2022-12-21 NOTE — PROGRESS NOTES
[facility]  Wound Care    Patient Name:  Prosper Soliz   MRN:  [id number]  Date: 12/21/2022  Diagnosis: Rectal cancer    History:     Past Medical History:   Diagnosis Date    Cancer     GERD without esophagitis     Hiatal hernia 05/18/2021    normal mucose in second part of the duodenum     Hypercholesterolemia     Hypertension     Hypothyroidism     Left groin hernia     Prostate cancer 1999    Rectal cancer 05/2021       Social History     Socioeconomic History    Marital status:    Tobacco Use    Smoking status: Never    Smokeless tobacco: Never   Substance and Sexual Activity    Alcohol use: Not Currently    Drug use: Never    Sexual activity: Not Currently       Precautions:     Allergies as of 11/07/2022    (No Known Allergies)       [facility] Assessment Details/Treatment   Patient and patient's son seen for ostomy lesson. Reviewed and demonstrated stoma and peristomal care. Reviewed reading materials, intake and out-take flow sheet, and the food reference guide. Reviewed ordering supplies for home. The pouch was removed. The peristomal skin and stoma were cleansed with water. A skin barrier was applied to the peristomal skin.  Demonstrated cutting pouch to fit stoma allowing 1/8 inch clearance. The pouch was applied to the stoma site without difficulty. Pt verbalized understanding of when to alert MD of issues with stoma. Discussed follow up plan of care and provided patient with contact name and numbers. Supplies for discharge left at the bedside.        12/21/22 1129        Colostomy 12/15/22 2300 LUQ   Placement Date/Time: 12/15/22 2300   Present Prior to Hospital Arrival?: No  Location: LUQ   Stomal Appliance 1 piece;Intact;No Leakage   Stoma Appearance pink;red;moist;protruding above skin level   Stoma Size (in)   (50)   Site Assessment Round;Moist;Pink;Protruding above skin level;Red   Peristomal Assessment Clean;Intact;Intact without breakdown;Dry   Accessories/Skin Care skin barrier paste;cleansed  w/ water   Stoma Function stool   Treatment Bag change   Tolerance no signs/symptoms of discomfort;assisted with appliance change;assisted with stoma care   Output (mL) 100 mL           12/21/2022

## 2022-12-21 NOTE — PLAN OF CARE
[facility]  Discharge Final Note    Primary Care Provider: Julien Stevenson NP    Expected Discharge Date: 12/21/2022    Patient will discharge with [facility]     Final Discharge Note (most recent)       Final Note - 12/21/22 0949          Final Note    Assessment Type Final Discharge Note     Anticipated Discharge Disposition Home-Health Care [facility]     Hospital Resources/Appts/Education Provided Appointments scheduled and added to AVS        Post-Acute Status    Post-Acute Authorization Home Health     Home Health Status Set-up Complete/Auth obtained                     Important Message from Medicare  Important Message from Medicare regarding Discharge Appeal Rights: Given to patient/caregiver, Explained to patient/caregiver, Signed/date by patient/caregiver     Date IMM was signed: 12/20/22  Time IMM was signed: 0930    Contact Info       H Robert Jensen MD   Specialty: Colon and Rectal Surgery   Relationship: Consulting Physician    [address]  [facility] [address]   Phone: [phone]       Next Steps: Follow up in 1 week(s)    Instructions: for DUARTE drain removal at 9AM    Elena Love, CNS   Specialty: Wound Care    [address]  [facility] [address]   Phone: [phone]       Next Steps: Follow up on 12/28/2022    Instructions: osotomy follow up at 11AM          Keke Gurrola RN, CM   Ext: [phone]

## 2022-12-21 NOTE — DISCHARGE SUMMARY
[facility]  Colorectal Surgery  Discharge Summary      Patient Name: Prosper Soliz  MRN: [id number]  Admission Date: 12/15/2022  Hospital Length of Stay: 6 days  Discharge Date and Time: 12/21/22  Attending Physician: DANAE Jensen MD   Discharging Provider: Cynthia Welsh NP  Primary Care Provider: Julien Stevenson NP     HPI:  No notes on file    Procedure(s) (LRB):  XI ROBOTIC, PROCTECTOMY (N/A)  XI ROBOTIC, PROCTECTOMY,  ABDOMINOPERINEAL (APR) (N/A)     Hospital Course:   86 yo male with locally advanced mid to low rectal CA.  he underwent short course radiotherapy completed 06/15/2021 followed by systemic chemotherapy, 8 cycles.  He initially experienced a complete clinical response following total neoadjuvant therapy.  However, in September 2022 he underwent surveillance flexible sigmoidoscopy which revealed evidence of an ulcerated lesion which was biopsied and revealed recurrent rectal cancer.  Surveillance MRI also revealed a small focus of diffusion restriction consistent with regrowth.  He consulted with his medical oncologist who recommended surgical resection as there were no systemic options available.  There was no evidence of distant metastatic disease.  I reviewed with the patient and his son surgical treatment options.  Given the patient's advanced age I recommended that he undergo abdominoperineal resection, robotic versus open.  The details of the procedure, permanence of colostomy, expected recuperation and the potential risks of surgery were discussed at length.  He had a normal post op course.  Once bowel function resumed diet was advanced.On day of discharge pt is jamir regular diet, abd and perineal inc line healing well, colostomy functional, ambulating in rain without difficulty, adequate pain control with oral medication, VS stable and afebrile.     Home with DUARTE in place due to high output.  FU one week Dr. Jensen         Goals of Care Treatment Preferences:  Code Status: Full  Code          Significant Diagnostic Studies: Labs: BMP: No results for input(s): GLU, NA, K, CL, CO2, BUN, CREATININE, CALCIUM, MG in the last 48 hours. and CBC No results for input(s): WBC, HGB, HCT, PLT in the last 48 hours.    Pending Diagnostic Studies:     Procedure Component Value Units Date/Time    Specimen to Pathology, Surgery General Surgery [[id number]] Collected: 12/15/22 4568    Order Status: Sent Lab Status: In process Updated: 12/16/22 1631    Specimen: Tissue         Final Active Diagnoses:    Diagnosis Date Noted POA    PRINCIPAL PROBLEM:  Rectal cancer [C20] 05/28/2021 Yes    Primary hypertension [I10] 11/23/2022 Yes    History of hypothyroidism [Z86.39] 11/23/2022 Yes    Secondary malignant neoplasm of intrapelvic lymph nodes [C77.5] 06/03/2021 Yes      Problems Resolved During this Admission:      Discharged Condition: good    Disposition: Home-Health Care [facility]    Follow Up:   Follow-up Information     H Robert Jensen MD Follow up in 1 week(s).    Specialty: Colon and Rectal Surgery  Why: for DUARTE drain removal at 9AM  Contact information:  [address]  [facility] [address]  [phone]             Elena Love, CNS Follow up on 12/28/2022.    Specialty: Wound Care  Why: osotomy follow up at 11AM  Contact information:  [address] [facility] [address]  [phone]                       Patient Instructions:      Diet Adult Regular   Order Comments: Low fiber, no fresh fruit or fresh vegetables, no nuts, grapes, popcorn or raisins     Lifting restrictions   Order Comments: No lifting anything greater than 5 pounds     No dressing needed   Order Comments: May shower, no tub bath     Notify your health care provider if you experience any of the following:  temperature >100.4     Notify your health care provider if you experience any of the following:  persistent nausea and vomiting or diarrhea     Notify your health care provider if you experience any of the following:  severe  uncontrolled pain     Notify your health care provider if you experience any of the following:  redness, tenderness, or signs of infection (pain, swelling, redness, odor or green/yellow discharge around incision site)     Notify your health care provider if you experience any of the following:  difficulty breathing or increased cough     Notify your health care provider if you experience any of the following:  severe persistent headache     Notify your health care provider if you experience any of the following:  worsening rash     Notify your health care provider if you experience any of the following:  persistent dizziness, light-headedness, or visual disturbances     Notify your health care provider if you experience any of the following:  increased confusion or weakness     Medications:  Reconciled Home Medications:      Medication List      START taking these medications    docusate sodium 50 MG capsule  Commonly known as: COLACE  Take 1 capsule (50 mg total) by mouth once daily.  Start taking on: December 22, 2022     gabapentin 300 MG capsule  Commonly known as: NEURONTIN  Take 1 capsule (300 mg total) by mouth 3 (three) times daily.     oxyCODONE 10 mg Tab immediate release tablet  Commonly known as: ROXICODONE  Take 1 tablet (10 mg total) by mouth every 4 (four) hours as needed for Pain.        CONTINUE taking these medications    amLODIPine 5 MG tablet  Commonly known as: NORVASC  Take 5 mg by mouth once daily.     aspirin 81 MG EC tablet  Commonly known as: ECOTRIN  Take 81 mg by mouth once daily.     atorvastatin 40 MG tablet  Commonly known as: LIPITOR  atorvastatin 40 mg tablet   TAKE 1 TABLET BY MOUTH EVERY DAY IN THE EVENING     ergocalciferol 50,000 unit Cap  Commonly known as: ERGOCALCIFEROL  Take 50,000 Units by mouth every 7 days.     levothyroxine 50 MCG tablet  Commonly known as: SYNTHROID  Synthroid 50 mcg tablet   TAKE 1 TABLET BY MOUTH EVERY DAY     LIDOcaine-prilocaine cream  Commonly known  as: EMLA  Apply topically as needed. To port site 30 minutes prior to port access.     metoprolol succinate 50 MG 24 hr tablet  Commonly known as: TOPROL-XL  Take 50 mg by mouth once daily.     olmesartan 40 MG tablet  Commonly known as: BENICAR  Take 40 mg by mouth once daily.     potassium chloride 20 mEq  Commonly known as: K-TAB  TAKE 1 TABLET BY MOUTH ONCE DAILY     promethazine 12.5 MG Tab  Commonly known as: PHENERGAN  TAKE 1 TABLET BY MOUTH EVERY 6 HOURS AS NEEDED FOR NAUSEA     zolpidem 10 mg Tab  Commonly known as: AMBIEN  Take 10 mg by mouth every evening.        STOP taking these medications    ciprofloxacin HCl 500 MG tablet  Commonly known as: CIPRO     metroNIDAZOLE 500 MG tablet  Commonly known as: YENY Welsh NP  Colorectal Surgery  [facility]

## 2022-12-21 NOTE — HOSPITAL COURSE
86 yo male with locally advanced mid to low rectal CA.  he underwent short course radiotherapy completed 06/15/2021 followed by systemic chemotherapy, 8 cycles.  He initially experienced a complete clinical response following total neoadjuvant therapy.  However, in September 2022 he underwent surveillance flexible sigmoidoscopy which revealed evidence of an ulcerated lesion which was biopsied and revealed recurrent rectal cancer.  Surveillance MRI also revealed a small focus of diffusion restriction consistent with regrowth.  He consulted with his medical oncologist who recommended surgical resection as there were no systemic options available.  There was no evidence of distant metastatic disease.  I reviewed with the patient and his son surgical treatment options.  Given the patient's advanced age I recommended that he undergo abdominoperineal resection, robotic versus open.  The details of the procedure, permanence of colostomy, expected recuperation and the potential risks of surgery were discussed at length.  He had a normal post op course.  Once bowel function resumed diet was advanced.On day of discharge pt is jamir regular diet, abd and perineal inc line healing well, colostomy functional, ambulating in rain without difficulty, adequate pain control with oral medication, VS stable and afebrile.     Home with DUARTE in place due to high output.  FU one week Dr. Jensen

## 2022-12-21 NOTE — ASSESSMENT & PLAN NOTE
OR 12/16 APR    -await return of bowel function, reg diet  -continue multi modality for pain control  -encourage ambulation and use of IS  -domingo hose and SCD  -prophalactic lovenox and PPI  -voiding  -consult wound care, appreciate help

## 2022-12-21 NOTE — PLAN OF CARE
Pt rested well overnight, denied pain. Colostomy bag intact and finally had stool output overnight. Soft, green stool noted, bag emptied once. DUARTE drain intact to RUQ, had 60ml of bloody drainage output overnight.Son at bedside. Safety and comfort maintained,VSS pt is anticipating discharge home today.

## 2022-12-22 PROCEDURE — G0180 MD CERTIFICATION HHA PATIENT: HCPCS | Mod: ,,, | Performed by: NURSE PRACTITIONER

## 2022-12-22 PROCEDURE — G0180 MD CERTIFICATION HHA PATIENT: HCPCS | Mod: ,,, | Performed by: COLON & RECTAL SURGERY

## 2022-12-22 PROCEDURE — G0180 PR HOME HEALTH MD CERTIFICATION: ICD-10-PCS | Mod: ,,, | Performed by: COLON & RECTAL SURGERY

## 2022-12-22 PROCEDURE — G0180 PR HOME HEALTH MD CERTIFICATION: ICD-10-PCS | Mod: ,,, | Performed by: NURSE PRACTITIONER

## 2022-12-24 ENCOUNTER — NURSE TRIAGE (OUTPATIENT)
Dept: ADMINISTRATIVE | Facility: CLINIC | Age: 87
End: 2022-12-24
Payer: COMMERCIAL

## 2022-12-24 NOTE — TELEPHONE ENCOUNTER
Prosper Tapia' son calling states Prosper is 9 days post robotic proctectomy with colostomy bag placed on 12/15/22 Concerned about colostomy bag not filling as much since discharged home. Victor M states pt has been eating less food since discharged and drinking less than he probably should be. Abdominal bloating feeling present, began today. Stool softener last taken at 1030 today. Advised per triage protocol to go to nearest ED now for physician eval or PCP triage. Offered to contact on call surgeon per request. Informed caller of brief hold. V/u.     Spoke to Dr. Gonzalez, on call provider regarding ot call. Per Dr. Gonzalez verbal advice, Stoma output depends on oral intake. If pt is able to maintain hydration and nutrition on his own, does not have to go to ED now. Ok to be seen in outpatient. Recommends 3 boost or ensure daily. Increase fluid intake. Try taking miralax for constipation. May take a second dose.  Call back if fever, abdominal pain, vomiting and /or worsening/new symptoms.   Updated Victor M per Dr. Gonzalez's verbal advice. V/u. Denies further needs at this time.   Reason for Disposition   [1] Drinking very little AND [2] dehydration suspected (e.g., no urine > 12 hours, very dry mouth, very lightheaded)    Additional Information   Negative: Sounds like a life-threatening emergency to the triager   Negative: Chest pain   Negative: Difficulty breathing   Negative: Acting confused (e.g., disoriented, slurred speech) or excessively sleepy   Negative: [1] Widespread rash AND [2] bright red, sunburn-like   Negative: [1] SEVERE headache AND [2] after spinal (epidural) anesthesia   Negative: [1] Vomiting AND [2] persists > 4 hours   Negative: [1] Vomiting AND [2] abdomen looks much more swollen than usual    Protocols used: Post-Op Symptoms and [id number]

## 2022-12-27 ENCOUNTER — TELEPHONE (OUTPATIENT)
Dept: SURGERY | Facility: CLINIC | Age: 87
End: 2022-12-27
Payer: COMMERCIAL

## 2022-12-27 NOTE — PROGRESS NOTES
HPI:  Prosper Soliz is a 87 y.o. male with history of rectal CA.   Treated with HAYDEE with [facility].  Entered into watch and wait.    Developed regrowth > 1 year after     12-   APR for regrowth     Interval hx:    POD 13.    Perineal d/c.  Serosanguinous.  No fever.    Minimal serous d/c from pelvic drain  Tolerating diet.  No     Past Medical History:   Diagnosis Date    Cancer     GERD without esophagitis     Hiatal hernia 05/18/2021    normal mucose in second part of the duodenum     Hypercholesterolemia     Hypertension     Hypothyroidism     Left groin hernia     Prostate cancer 1999    Rectal cancer 05/2021        Past Surgical History:   Procedure Laterality Date    APPENDECTOMY      COLONOSCOPY W/ BIOPSIES  5.18.21 polyp in hepatic flexure,    ESOPHAGOGASTRODUODENOSCOPY  05/18/2021    POLYPECTOMY  05/21/2021    masss in the proximal rectum and mid rectum ( biopsy)    PROSTATECTOMY  1999    ROBOT-ASSISTED LAPAROSCOPIC ABDOMINOPERINEAL RESECTION USING DA JOSEMANUEL XI N/A 12/15/2022    Procedure: XI ROBOTIC, PROCTECTOMY,  ABDOMINOPERINEAL (APR);  Surgeon: DANAE Jensen MD;  Location: [facility] OR [facility] FLR;  Service: Colon and Rectal;  Laterality: N/A;    ROBOT-ASSISTED LAPAROSCOPIC COMPLETION PROCTECTOMY USING DA JOSEMANUEL XI N/A 12/15/2022    Procedure: XI ROBOTIC, PROCTECTOMY;  Surgeon: DANAE Jensen MD;  Location: [facility] OR 2ND FLR;  Service: Colon and Rectal;  Laterality: N/A;       Review of patient's allergies indicates:  No Known Allergies    Family History   Problem Relation Age of Onset    Heart disease Mother     Stroke Mother     Lung cancer Father     Bone cancer Brother     Coronary artery disease Brother        Social History     Socioeconomic History    Marital status:    Tobacco Use    Smoking status: Never    Smokeless tobacco: Never   Substance and Sexual Activity    Alcohol use: Not Currently    Drug use: Never    Sexual activity: Not Currently       ROS:  GENERAL: No fever, chills,  "fatigability or weight loss.  Integument: No rashes, redness, icterus  CHEST: Denies WALKER, cyanosis, wheezing, cough and sputum production.  CARDIOVASCULAR: Denies chest pain, PND, orthopnea or reduced exercise tolerance.  GI: Denies abd pain, dysphagia, nausea, vomiting, no hematemesis   : Denies burning on urination, no hematuria, no bacteriuria  MSK: No deformities, swelling, joint pain swelling  Neurologic: No HAs, seizures, weakness, paresthesias, gait problems    PE:  General appearance not ill appearing  /60 (BP Location: Left arm, Patient Position: Sitting, BP Method: Small (Automatic))   Pulse 63   Ht 5' 10" (1.778 m)   Wt 75.9 kg (167 lb 4.8 oz)   BMI 24.01 kg/m²     Sclera/ Skin anicteric  LN none palpable  AT NC EOMI  Neck supple trachea midline   Chest symmetric, nl excursion, no retractions, breathing comfortably  Abdomen      ND soft NT.  no masses, no organomegaly  EXT - no CCE  Neuro:  Mood/ affect nl, alert and oriented x 3, moves all ext's, gait nl  Perineal wound    Small skin dehiscence.  No redness or tenderness.  Ecchymosis.  Small area of skin necrosis (posteriorly).    No drainage on palpation      Assessment:  Perineal wound d/c  No signs of infection    Plan:  CT cystogram to rule out bladder fistula as cause of drainage  Local wound care - pads for d/c  Check labs  RTC 2 weeks        "

## 2022-12-27 NOTE — TELEPHONE ENCOUNTER
Spoke with Caridad, home health nurse. Reports that patient has a steady drip of bright red blood from anal stitch, does not saturate, but is a steady drip. Patient does report an increase in pain. Patient does have an appointment with Dr Jensen and Elena tomorrow and is aware. Reports that patient has also been having urinary incontinence upon standing up and has been having to wear a diaper. Will leave message to Heidy.

## 2022-12-27 NOTE — TELEPHONE ENCOUNTER
----- Message from Braeden Rivera sent at 12/27/2022  1:16 PM CST -----  Contact: Caridad  Pt home health nurse Caridad requesting call back RE: she states that pt is dripping bright red blood and the area is puffy, please call to speak with her      Contact [phone]

## 2022-12-28 ENCOUNTER — LAB VISIT (OUTPATIENT)
Dept: LAB | Facility: HOSPITAL | Age: 87
End: 2022-12-28
Attending: COLON & RECTAL SURGERY
Payer: COMMERCIAL

## 2022-12-28 ENCOUNTER — OFFICE VISIT (OUTPATIENT)
Dept: SURGERY | Facility: CLINIC | Age: 87
End: 2022-12-28
Payer: COMMERCIAL

## 2022-12-28 ENCOUNTER — OFFICE VISIT (OUTPATIENT)
Dept: WOUND CARE | Facility: CLINIC | Age: 87
End: 2022-12-28
Payer: COMMERCIAL

## 2022-12-28 VITALS
HEIGHT: 70 IN | WEIGHT: 167.31 LBS | HEART RATE: 63 BPM | BODY MASS INDEX: 23.95 KG/M2 | SYSTOLIC BLOOD PRESSURE: 130 MMHG | DIASTOLIC BLOOD PRESSURE: 60 MMHG

## 2022-12-28 DIAGNOSIS — Z48.89 ENCOUNTER FOR POST SURGICAL WOUND CHECK: ICD-10-CM

## 2022-12-28 DIAGNOSIS — N31.9 NEUROMUSCULAR DYSFUNCTION OF BLADDER, UNSPECIFIED: ICD-10-CM

## 2022-12-28 DIAGNOSIS — C20 RECTAL CANCER: ICD-10-CM

## 2022-12-28 DIAGNOSIS — C20 RECTAL CANCER: Primary | ICD-10-CM

## 2022-12-28 DIAGNOSIS — Z71.89 ENCOUNTER FOR OSTOMY CARE EDUCATION: ICD-10-CM

## 2022-12-28 DIAGNOSIS — Z43.3 ATTENTION TO COLOSTOMY: Primary | ICD-10-CM

## 2022-12-28 LAB
ALBUMIN SERPL BCP-MCNC: 2.9 G/DL (ref 3.5–5.2)
ALP SERPL-CCNC: 118 U/L (ref 55–135)
ALT SERPL W/O P-5'-P-CCNC: 60 U/L (ref 10–44)
ANION GAP SERPL CALC-SCNC: 10 MMOL/L (ref 8–16)
AST SERPL-CCNC: 28 U/L (ref 10–40)
BASOPHILS # BLD AUTO: 0.09 K/UL (ref 0–0.2)
BASOPHILS NFR BLD: 1 % (ref 0–1.9)
BILIRUB SERPL-MCNC: 0.7 MG/DL (ref 0.1–1)
BUN SERPL-MCNC: 5 MG/DL (ref 8–23)
CALCIUM SERPL-MCNC: 9 MG/DL (ref 8.7–10.5)
CEA SERPL-MCNC: 2.4 NG/ML (ref 0–5)
CHLORIDE SERPL-SCNC: 103 MMOL/L (ref 95–110)
CO2 SERPL-SCNC: 28 MMOL/L (ref 23–29)
CREAT SERPL-MCNC: 0.8 MG/DL (ref 0.5–1.4)
CRP SERPL-MCNC: 45.6 MG/L (ref 0–8.2)
DIFFERENTIAL METHOD: ABNORMAL
EOSINOPHIL # BLD AUTO: 0.2 K/UL (ref 0–0.5)
EOSINOPHIL NFR BLD: 2.2 % (ref 0–8)
ERYTHROCYTE [DISTWIDTH] IN BLOOD BY AUTOMATED COUNT: 13 % (ref 11.5–14.5)
EST. GFR  (NO RACE VARIABLE): >60 ML/MIN/1.73 M^2
GLUCOSE SERPL-MCNC: 112 MG/DL (ref 70–110)
HCT VFR BLD AUTO: 31.1 % (ref 40–54)
HGB BLD-MCNC: 10 G/DL (ref 14–18)
IMM GRANULOCYTES # BLD AUTO: 0.09 K/UL (ref 0–0.04)
IMM GRANULOCYTES NFR BLD AUTO: 1 % (ref 0–0.5)
LYMPHOCYTES # BLD AUTO: 0.5 K/UL (ref 1–4.8)
LYMPHOCYTES NFR BLD: 5.2 % (ref 18–48)
MCH RBC QN AUTO: 30.8 PG (ref 27–31)
MCHC RBC AUTO-ENTMCNC: 32.2 G/DL (ref 32–36)
MCV RBC AUTO: 96 FL (ref 82–98)
MONOCYTES # BLD AUTO: 0.9 K/UL (ref 0.3–1)
MONOCYTES NFR BLD: 9.3 % (ref 4–15)
NEUTROPHILS # BLD AUTO: 7.7 K/UL (ref 1.8–7.7)
NEUTROPHILS NFR BLD: 81.3 % (ref 38–73)
NRBC BLD-RTO: 0 /100 WBC
PLATELET # BLD AUTO: 193 K/UL (ref 150–450)
PMV BLD AUTO: 10.5 FL (ref 9.2–12.9)
POTASSIUM SERPL-SCNC: 4.2 MMOL/L (ref 3.5–5.1)
PROT SERPL-MCNC: 6.9 G/DL (ref 6–8.4)
RBC # BLD AUTO: 3.25 M/UL (ref 4.6–6.2)
SODIUM SERPL-SCNC: 141 MMOL/L (ref 136–145)
WBC # BLD AUTO: 9.45 K/UL (ref 3.9–12.7)

## 2022-12-28 PROCEDURE — 82378 CARCINOEMBRYONIC ANTIGEN: CPT | Performed by: COLON & RECTAL SURGERY

## 2022-12-28 PROCEDURE — 1157F PR ADVANCE CARE PLAN OR EQUIV PRESENT IN MEDICAL RECORD: ICD-10-PCS | Mod: CPTII,S$GLB,, | Performed by: COLON & RECTAL SURGERY

## 2022-12-28 PROCEDURE — 1157F PR ADVANCE CARE PLAN OR EQUIV PRESENT IN MEDICAL RECORD: ICD-10-PCS | Mod: CPTII,S$GLB,, | Performed by: CLINICAL NURSE SPECIALIST

## 2022-12-28 PROCEDURE — 1101F PR PT FALLS ASSESS DOC 0-1 FALLS W/OUT INJ PAST YR: ICD-10-PCS | Mod: CPTII,S$GLB,, | Performed by: COLON & RECTAL SURGERY

## 2022-12-28 PROCEDURE — 36415 COLL VENOUS BLD VENIPUNCTURE: CPT | Performed by: COLON & RECTAL SURGERY

## 2022-12-28 PROCEDURE — 1125F AMNT PAIN NOTED PAIN PRSNT: CPT | Mod: CPTII,S$GLB,, | Performed by: COLON & RECTAL SURGERY

## 2022-12-28 PROCEDURE — 1160F PR REVIEW ALL MEDS BY PRESCRIBER/CLIN PHARMACIST DOCUMENTED: ICD-10-PCS | Mod: CPTII,S$GLB,, | Performed by: CLINICAL NURSE SPECIALIST

## 2022-12-28 PROCEDURE — 1160F RVW MEDS BY RX/DR IN RCRD: CPT | Mod: CPTII,S$GLB,, | Performed by: CLINICAL NURSE SPECIALIST

## 2022-12-28 PROCEDURE — 3078F DIAST BP <80 MM HG: CPT | Mod: CPTII,S$GLB,, | Performed by: COLON & RECTAL SURGERY

## 2022-12-28 PROCEDURE — 1157F ADVNC CARE PLAN IN RCRD: CPT | Mod: CPTII,S$GLB,, | Performed by: CLINICAL NURSE SPECIALIST

## 2022-12-28 PROCEDURE — 99024 PR POST-OP FOLLOW-UP VISIT: ICD-10-PCS | Mod: S$GLB,,, | Performed by: CLINICAL NURSE SPECIALIST

## 2022-12-28 PROCEDURE — 3075F SYST BP GE 130 - 139MM HG: CPT | Mod: CPTII,S$GLB,, | Performed by: COLON & RECTAL SURGERY

## 2022-12-28 PROCEDURE — 3288F PR FALLS RISK ASSESSMENT DOCUMENTED: ICD-10-PCS | Mod: CPTII,S$GLB,, | Performed by: COLON & RECTAL SURGERY

## 2022-12-28 PROCEDURE — 80053 COMPREHEN METABOLIC PANEL: CPT | Performed by: COLON & RECTAL SURGERY

## 2022-12-28 PROCEDURE — 86140 C-REACTIVE PROTEIN: CPT | Performed by: COLON & RECTAL SURGERY

## 2022-12-28 PROCEDURE — 99024 POSTOP FOLLOW-UP VISIT: CPT | Mod: S$GLB,,, | Performed by: CLINICAL NURSE SPECIALIST

## 2022-12-28 PROCEDURE — 1101F PT FALLS ASSESS-DOCD LE1/YR: CPT | Mod: CPTII,S$GLB,, | Performed by: COLON & RECTAL SURGERY

## 2022-12-28 PROCEDURE — 85025 COMPLETE CBC W/AUTO DIFF WBC: CPT | Performed by: COLON & RECTAL SURGERY

## 2022-12-28 PROCEDURE — 99024 PR POST-OP FOLLOW-UP VISIT: ICD-10-PCS | Mod: S$GLB,,, | Performed by: COLON & RECTAL SURGERY

## 2022-12-28 PROCEDURE — 1159F PR MEDICATION LIST DOCUMENTED IN MEDICAL RECORD: ICD-10-PCS | Mod: CPTII,S$GLB,, | Performed by: CLINICAL NURSE SPECIALIST

## 2022-12-28 PROCEDURE — 1125F PR PAIN SEVERITY QUANTIFIED, PAIN PRESENT: ICD-10-PCS | Mod: CPTII,S$GLB,, | Performed by: COLON & RECTAL SURGERY

## 2022-12-28 PROCEDURE — 3075F PR MOST RECENT SYSTOLIC BLOOD PRESS GE 130-139MM HG: ICD-10-PCS | Mod: CPTII,S$GLB,, | Performed by: COLON & RECTAL SURGERY

## 2022-12-28 PROCEDURE — 99999 PR PBB SHADOW E&M-EST. PATIENT-LVL IV: ICD-10-PCS | Mod: PBBFAC,,, | Performed by: COLON & RECTAL SURGERY

## 2022-12-28 PROCEDURE — 3288F FALL RISK ASSESSMENT DOCD: CPT | Mod: CPTII,S$GLB,, | Performed by: COLON & RECTAL SURGERY

## 2022-12-28 PROCEDURE — 99999 PR PBB SHADOW E&M-EST. PATIENT-LVL III: ICD-10-PCS | Mod: PBBFAC,,, | Performed by: CLINICAL NURSE SPECIALIST

## 2022-12-28 PROCEDURE — 1159F MED LIST DOCD IN RCRD: CPT | Mod: CPTII,S$GLB,, | Performed by: CLINICAL NURSE SPECIALIST

## 2022-12-28 PROCEDURE — 3078F PR MOST RECENT DIASTOLIC BLOOD PRESSURE < 80 MM HG: ICD-10-PCS | Mod: CPTII,S$GLB,, | Performed by: COLON & RECTAL SURGERY

## 2022-12-28 PROCEDURE — 1157F ADVNC CARE PLAN IN RCRD: CPT | Mod: CPTII,S$GLB,, | Performed by: COLON & RECTAL SURGERY

## 2022-12-28 PROCEDURE — 99024 POSTOP FOLLOW-UP VISIT: CPT | Mod: S$GLB,,, | Performed by: COLON & RECTAL SURGERY

## 2022-12-28 PROCEDURE — 99999 PR PBB SHADOW E&M-EST. PATIENT-LVL III: CPT | Mod: PBBFAC,,, | Performed by: CLINICAL NURSE SPECIALIST

## 2022-12-28 PROCEDURE — 99999 PR PBB SHADOW E&M-EST. PATIENT-LVL IV: CPT | Mod: PBBFAC,,, | Performed by: COLON & RECTAL SURGERY

## 2022-12-28 RX ORDER — OXYCODONE HYDROCHLORIDE 10 MG/1
10 TABLET ORAL EVERY 6 HOURS PRN
Qty: 20 TABLET | Refills: 0 | Status: SHIPPED | OUTPATIENT
Start: 2022-12-28 | End: 2023-10-12

## 2022-12-28 NOTE — Clinical Note
I found a slow drip on wound serosanguinous,  prob from dependency he sleeps in recliner chair  Pic in my note  Nice stoma

## 2022-12-28 NOTE — PROGRESS NOTES
This patient is known to me and is here in clinic today for first post op evaluation related to colostomy. Surgery done 12/15/22 by Dr. Jensen. The patient reports no  problems with  new ostomy. The patient is receiving home health care with [facility].   Pain level today is reported as  2 in perineal wound     The colostomy is 43mm gwendolyn medium budded stoma.  The patient is currently  wearing a 1piece pouching system by [employer].   Average wear time is 3 days.   Peristomal skin is clear    There is no  mucocutaneous separation.  Pt is coping well with the new ostomy.  SUPPLIES/DME: sent today to [facility] but he is to get supplies from  while under their care per Medicare rules    Pouching concerns include:    He will need a slight pull up to place so he can use PRECUT for the long term   Placed a closed end today and gave him samples to try ,both [employer] and [employer].... this is my rec as he has 1 or 2 BM a day at most and very regular   This will be easiest for him polo with fingers that are not working well due to CHEMO   Instructed to not use skin prep unless skin is irritated,     I rechecked his perineal wound as he said it started to bleed AFTER seen by Dr Jensen who saw no blood . He has a slow drip of serosanguious drg and some dead skin ( soft scab) to slough,  we discussed ways to manage this and gave him NEMESIO to place on at bedtime  ( son will do this for now )  he sleeps in a lounge chair so the fluid buildup to be expected.   SPECIAL NEEDS:  Pt counseled on skin care, nutrition, hydration as well as  how to order ostomy supplies.  I spent greater than 50% of this 45 minute visit in face to face counseling.

## 2022-12-30 ENCOUNTER — PATIENT MESSAGE (OUTPATIENT)
Dept: SURGERY | Facility: CLINIC | Age: 87
End: 2022-12-30
Payer: COMMERCIAL

## 2023-01-04 ENCOUNTER — DOCUMENTATION ONLY (OUTPATIENT)
Dept: SURGERY | Facility: CLINIC | Age: 88
End: 2023-01-04
Payer: COMMERCIAL

## 2023-01-04 NOTE — PROGRESS NOTES
[facility]  Colon, Rectal and Anal Malignancies  Multi-disciplinary Tumor Board     [address]  [place], [place]  Tel: [phone]  Fax: [phone]  [email or web address]     Patient name: Prosper Soliz   YOB: 1935   MRN: [id number]    Date of discussion: 01/04/2023    Thank you for referring Mr. Soliz to our care here at [facility]. Please allow us to summarize our review of records and recommendations.    The following disciplines were present for the discussion:   Colon and Rectal Surgery  Medical Oncology  Radiation Oncology  Pathology  Radiology    Endoscopy reviewed:   Date performed: 8/31/2022  Flexible sigmoidoscopy    CT Chest, Abdomen and Pelvis   Date performed: 10/24/2022   Performed at our facility: Yes  Metastatic disease present: Equivocal, surveillance recommended    MRI Rectal Cancer Protocol  Date performed: 9/14/2022   Performed at our facility: Yes  Tumor location in the rectum: Lower (0-5 cm)  Sphincter involvement: Absent  Pretreatment circumferential resection margin status: Not performed     Pre-treatment CEA:  Date performed: 8/31/2022  CEA Level:  2.0     Pre-treatment Clinical Stage:  T3 - Tumor invades the muscularis propria  N+ - Desirae disease is suspected  M0 - No metastasis suspected    Pre-treatment Clinical Stage:  T3 - Tumor invades the muscularis propria  N+ - Desirae disease is suspected  M0 - No metastasis suspected    Based on our review of the current information we have made the following recommendations:  87M c T3N1M0 rectal cancer s/p HAYDEE with complete response, on watch and wait protocol, with recurrence, s/p APR. On Path review pt with invasive carcinoma <1mm from the circumfrential resection margin (initially read as 1mm). Recommendation is for close surveillance with baseline MRI once healed from APR and then alternating PET/MRI every 3-6months thereafter.     Clinical research study eligibility - No    Please do  not hesitate to contact us for any questions.

## 2023-01-09 LAB
FINAL PATHOLOGIC DIAGNOSIS: NORMAL
GROSS: NORMAL
Lab: NORMAL
SUPPLEMENTAL DIAGNOSIS: NORMAL

## 2023-01-11 ENCOUNTER — OFFICE VISIT (OUTPATIENT)
Dept: SURGERY | Facility: CLINIC | Age: 88
End: 2023-01-11
Payer: MEDICARE

## 2023-01-11 VITALS
WEIGHT: 161.5 LBS | BODY MASS INDEX: 23.12 KG/M2 | HEART RATE: 60 BPM | DIASTOLIC BLOOD PRESSURE: 60 MMHG | HEIGHT: 70 IN | SYSTOLIC BLOOD PRESSURE: 134 MMHG

## 2023-01-11 DIAGNOSIS — K65.1 PELVIC ABSCESS IN MALE: ICD-10-CM

## 2023-01-11 PROCEDURE — 1101F PT FALLS ASSESS-DOCD LE1/YR: CPT | Mod: CPTII,S$GLB,, | Performed by: COLON & RECTAL SURGERY

## 2023-01-11 PROCEDURE — 99024 POSTOP FOLLOW-UP VISIT: CPT | Mod: S$GLB,,, | Performed by: COLON & RECTAL SURGERY

## 2023-01-11 PROCEDURE — 99024 PR POST-OP FOLLOW-UP VISIT: ICD-10-PCS | Mod: S$GLB,,, | Performed by: COLON & RECTAL SURGERY

## 2023-01-11 PROCEDURE — 1111F DSCHRG MED/CURRENT MED MERGE: CPT | Mod: CPTII,S$GLB,, | Performed by: COLON & RECTAL SURGERY

## 2023-01-11 PROCEDURE — 1157F ADVNC CARE PLAN IN RCRD: CPT | Mod: CPTII,S$GLB,, | Performed by: COLON & RECTAL SURGERY

## 2023-01-11 PROCEDURE — 1157F PR ADVANCE CARE PLAN OR EQUIV PRESENT IN MEDICAL RECORD: ICD-10-PCS | Mod: CPTII,S$GLB,, | Performed by: COLON & RECTAL SURGERY

## 2023-01-11 PROCEDURE — 3288F FALL RISK ASSESSMENT DOCD: CPT | Mod: CPTII,S$GLB,, | Performed by: COLON & RECTAL SURGERY

## 2023-01-11 PROCEDURE — 1111F PR DISCHARGE MEDS RECONCILED W/ CURRENT OUTPATIENT MED LIST: ICD-10-PCS | Mod: CPTII,S$GLB,, | Performed by: COLON & RECTAL SURGERY

## 2023-01-11 PROCEDURE — 3078F DIAST BP <80 MM HG: CPT | Mod: CPTII,S$GLB,, | Performed by: COLON & RECTAL SURGERY

## 2023-01-11 PROCEDURE — 3075F SYST BP GE 130 - 139MM HG: CPT | Mod: CPTII,S$GLB,, | Performed by: COLON & RECTAL SURGERY

## 2023-01-11 PROCEDURE — 3288F PR FALLS RISK ASSESSMENT DOCUMENTED: ICD-10-PCS | Mod: CPTII,S$GLB,, | Performed by: COLON & RECTAL SURGERY

## 2023-01-11 PROCEDURE — 3075F PR MOST RECENT SYSTOLIC BLOOD PRESS GE 130-139MM HG: ICD-10-PCS | Mod: CPTII,S$GLB,, | Performed by: COLON & RECTAL SURGERY

## 2023-01-11 PROCEDURE — 3078F PR MOST RECENT DIASTOLIC BLOOD PRESSURE < 80 MM HG: ICD-10-PCS | Mod: CPTII,S$GLB,, | Performed by: COLON & RECTAL SURGERY

## 2023-01-11 PROCEDURE — 1125F PR PAIN SEVERITY QUANTIFIED, PAIN PRESENT: ICD-10-PCS | Mod: CPTII,S$GLB,, | Performed by: COLON & RECTAL SURGERY

## 2023-01-11 PROCEDURE — 99999 PR PBB SHADOW E&M-EST. PATIENT-LVL IV: CPT | Mod: PBBFAC,,, | Performed by: COLON & RECTAL SURGERY

## 2023-01-11 PROCEDURE — 1125F AMNT PAIN NOTED PAIN PRSNT: CPT | Mod: CPTII,S$GLB,, | Performed by: COLON & RECTAL SURGERY

## 2023-01-11 PROCEDURE — 1101F PR PT FALLS ASSESS DOC 0-1 FALLS W/OUT INJ PAST YR: ICD-10-PCS | Mod: CPTII,S$GLB,, | Performed by: COLON & RECTAL SURGERY

## 2023-01-11 PROCEDURE — 99999 PR PBB SHADOW E&M-EST. PATIENT-LVL IV: ICD-10-PCS | Mod: PBBFAC,,, | Performed by: COLON & RECTAL SURGERY

## 2023-01-11 NOTE — PROGRESS NOTES
HPI:  Prosper Soliz is a 87 y.o. male with history of rectal CA.   Treated with HAYDEE with [facility].  Entered into watch and wait.    Developed regrowth > 1 year after      12-   APR for regrowth      12-  Interval hx:    POD 13.    Perineal d/c.  Serosanguinous.  No fever.    Minimal serous d/c from pelvic drain  Tolerating diet.  No     01-  CT urogram  COMPARISON:  CT abdomen pelvis with IV contrast 07/13/2022.     FINDINGS:  Motion on some images.     Precontrast images demonstrate no renal stones or hydronephrosis.  No abnormal parenchymal calcifications.     Postcontrast images demonstrate no hepatic abnormality.  No gallstones.  The spleen, pancreas, adrenal glands demonstrate no abnormality.     There is a 3 cm cyst lower pole right kidney.  There are no solid masses in the right kidney.  No filling defect identified in the right renal collecting system.     Several small cysts in the left kidney.  No solid masses in the left kidney.  No filling defects identified in the left renal collecting system.     No abnormality of urinary bladder.     There is no gross gastric abnormality.  There are no dilated loops of bowel.  Left lower quadrant diverting colostomy.     Changes of rectal resection and prostatectomy are noted.  There is a fluid collection at the resection cavity extending into the presacral region measuring approximately 6 x 6 cm in the axial plane and 14 cm in craniocaudal dimension (image 44 of series 700 sagittal).     There is no aortic aneurysm.  No lymph node enlargement.  The visualized lung bases demonstrate bilateral dependent atelectasis/scarring.  No osseous lesion.     Impression:     1. Changes of rectal resection and prostatectomy are noted.  There is a fluid collection at the resection cavity extending into the presacral region, measuring approximately 6 x 6 cm in the axial plane and 14 cm in craniocaudal dimension, could reflect a postoperative seroma or abscess.  2.  Otherwise, unremarkable examination.        Electronically signed by: Ten Bran MD  Date:                                            01/06/2023  Time:                                           17:01       1-  Interval hx  Reports persistent pain.  No fever.  Does not feel well.        Past Medical History:   Diagnosis Date    GERD without esophagitis     Hiatal hernia 05/18/2021    normal mucose in second part of the duodenum     Hypercholesterolemia     Hypertension     Hypothyroidism     Left groin hernia     Prostate cancer 1999    Rectal cancer 05/2021        Past Surgical History:   Procedure Laterality Date    APPENDECTOMY      COLONOSCOPY W/ BIOPSIES  05/18/2021    masss in the proximal rectum and mid rectum (biopsy)    ESOPHAGOGASTRODUODENOSCOPY  05/18/2021    PROSTATECTOMY  1999    ROBOT-ASSISTED LAPAROSCOPIC ABDOMINOPERINEAL RESECTION USING DA JOSEMANUEL XI N/A 12/15/2022    Procedure: XI ROBOTIC, PROCTECTOMY,  ABDOMINOPERINEAL (APR);  Surgeon: DANAE Jensen MD;  Location: [facility] OR 2ND FLR;  Service: Colon and Rectal;  Laterality: N/A;    ROBOT-ASSISTED LAPAROSCOPIC COMPLETION PROCTECTOMY USING DA JOSEMANUEL XI N/A 12/15/2022    Procedure: XI ROBOTIC, PROCTECTOMY;  Surgeon: DANAE Jensen MD;  Location: [facility] OR 2ND FLR;  Service: Colon and Rectal;  Laterality: N/A;       Review of patient's allergies indicates:  No Known Allergies    Family History   Problem Relation Age of Onset    Heart disease Mother     Stroke Mother     Lung cancer Father     Bone cancer Brother     Coronary artery disease Brother        Social History     Socioeconomic History    Marital status:    Tobacco Use    Smoking status: Never    Smokeless tobacco: Never   Substance and Sexual Activity    Alcohol use: Not Currently    Drug use: Never    Sexual activity: Not Currently       ROS:  GENERAL: No fever, chills, fatigability or weight loss.  Integument: No rashes, redness, icterus  CHEST: Denies WALKER, cyanosis,  "wheezing, cough and sputum production.  CARDIOVASCULAR: Denies chest pain, PND, orthopnea or reduced exercise tolerance.  GI: Denies abd pain, dysphagia, nausea, vomiting, no hematemesis   : Denies burning on urination, no hematuria, no bacteriuria  MSK: No deformities, swelling, joint pain swelling  Neurologic: No HAs, seizures, weakness, paresthesias, gait problems    PE:  General appearance uncomfortable in NAD  /60 (BP Location: Left arm, Patient Position: Sitting, BP Method: Large (Automatic))   Pulse 60   Ht 5' 10" (1.778 m)   Wt 73.3 kg (161 lb 8 oz)   BMI 23.17 kg/m²     Sclera/ Skin anicteric  LN none palpable  AT NC EOMI  Neck supple trachea midline   Chest symmetric, nl excursion, no retractions, breathing comfortably  Abdomen    Incisions healed  ND soft NT.  no masses, no organomegaly  EXT - no CCE  Neuro:  Mood/ affect nl, alert and oriented x 3, moves all ext's, gait nl    Perineum  Inspection     Mild erythema.    No fluctuance      Assessment:  Symptomatic fluid collection/ pelvic abscess on CT scan      Plan:  Admit, IR drainage of fluid collection    "

## 2023-01-13 ENCOUNTER — SOCIAL WORK (OUTPATIENT)
Dept: SURGERY | Facility: HOSPITAL | Age: 88
End: 2023-01-13
Payer: COMMERCIAL

## 2023-01-13 ENCOUNTER — DOCUMENTATION ONLY (OUTPATIENT)
Dept: SURGERY | Facility: HOSPITAL | Age: 88
End: 2023-01-13
Payer: COMMERCIAL

## 2023-01-13 ENCOUNTER — TELEPHONE (OUTPATIENT)
Dept: SURGERY | Facility: CLINIC | Age: 88
End: 2023-01-13
Payer: COMMERCIAL

## 2023-01-13 NOTE — PROGRESS NOTES
[facility] COLON & RECTAL SURGERY      HOME HEALTH ORDERS  FACE TO FACE ENCOUNTER    Patient Name: Prosper Soliz  YOB: 1935    PCP: Julien Stevenson NP   PCP Address: [address] [place] [address] [address]LO[address] [facility] [place] *  PCP Phone Number: [phone]  PCP Fax: [phone]    Encounter Date: 1/13/23    Admit to Home Health    Diagnoses:  There are no hospital problems to display for this patient.      Follow Up Appointments:  Future Appointments   Date Time Provider Department Center   2/1/2023  8:40 AM H. Robert Jensen MD [facility] COLSURG [facility]       Allergies:Review of patient's allergies indicates:  No Known Allergies    Medications: Review discharge medications with patient and family and provide education.    Current Outpatient Medications   Medication Sig Dispense Refill    amLODIPine (NORVASC) 5 MG tablet Take 5 mg by mouth once daily.      aspirin (ECOTRIN) 81 MG EC tablet Take 81 mg by mouth once daily.      atorvastatin (LIPITOR) 40 MG tablet atorvastatin 40 mg tablet   TAKE 1 TABLET BY MOUTH EVERY DAY IN THE EVENING      docusate sodium (COLACE) 50 MG capsule Take 1 capsule (50 mg total) by mouth once daily. (Patient not taking: Reported on 12/28/2022) 30 capsule 6    ergocalciferol (ERGOCALCIFEROL) 50,000 unit Cap Take 50,000 Units by mouth every 7 days.      gabapentin (NEURONTIN) 300 MG capsule Take 1 capsule (300 mg total) by mouth 3 (three) times daily. 90 capsule 0    levothyroxine (SYNTHROID) 50 MCG tablet Synthroid 50 mcg tablet   TAKE 1 TABLET BY MOUTH EVERY DAY      LIDOcaine-prilocaine (EMLA) cream Apply topically as needed. To port site 30 minutes prior to port access. (Patient not taking: Reported on 12/28/2022) 30 g 2    metoprolol succinate (TOPROL-XL) 50 MG 24 hr tablet Take 50 mg by mouth once daily.      olmesartan (BENICAR) 40 MG tablet Take 40 mg by mouth once daily.      oxyCODONE (ROXICODONE) 10 mg Tab immediate release tablet Take 1 tablet (10 mg total) by  mouth every 6 (six) hours as needed for Pain. 20 tablet 0    oxyCODONE (ROXICODONE) 5 MG immediate release tablet Take 1 tablet (5 mg total) by mouth every 4 (four) hours as needed for Pain. 5 tablet 0    potassium chloride (K-TAB) 20 mEq TAKE 1 TABLET BY MOUTH ONCE DAILY (Patient not taking: Reported on 12/28/2022) 90 tablet 4    promethazine (PHENERGAN) 12.5 MG Tab TAKE 1 TABLET BY MOUTH EVERY 6 HOURS AS NEEDED FOR NAUSEA 30 tablet 2    zolpidem (AMBIEN) 10 mg Tab Take 10 mg by mouth every evening.       Current Facility-Administered Medications   Medication Dose Route Frequency Provider Last Rate Last Admin    potassium chloride SA CR tablet 40 mEq  40 mEq Oral Once Anay Pennington NP         Cannot display discharge medications since this is not an admission.        I have seen and examined this patient within the last 30 days. My clinical findings that support the need for the home health skilled services and home bound status are the following:no   Weakness/numbness causing balance and gait disturbance due to Surgery making it taxing to leave home.     Diet:   regular diet    Labs:  None    Referrals/ Consults  Aide to provide assistance with personal care, ADLs, and vital signs.    Activities:   activity as tolerated    Nursing:   Agency to admit patient within 24 hours of hospital discharge unless specified on physician order or at patient request    SN to complete comprehensive assessment including routine vital signs. Instruct on disease process and s/s of complications to report to MD. Review/verify medication list sent home with the patient at time of discharge  and instruct patient/caregiver as needed. Frequency may be adjusted depending on start of care date.     Skilled nurse to perform up to 3 visits PRN for symptoms related to diagnosis    Notify MD if SBP > 160 or < 90; DBP > 90 or < 50; HR > 120 or < 50; Temp > 101; O2 < 88%; Other:       Ok to schedule additional visits based on staff availability  and patient request on consecutive days within the home health episode.    When multiple disciplines ordered:    Start of Care occurs on Sunday - Wednesday schedule remaining discipline evaluations as ordered on separate consecutive days following the start of care.    Thursday SOC -schedule subsequent evaluations Friday and Monday the following week.     Friday - Saturday SOC - schedule subsequent discipline evaluations on consecutive days starting Monday of the following week.    For all post-discharge communication and subsequent orders please contact patient's primary care physician. If unable to reach primary care physician or do not receive response within 30 minutes, please contact Dr. Jensen [facility] for clinical staff order clarification    Miscellaneous   N/A    Home Health Aide:  Nursing Three times weekly    Wound Care Orders  yes:  Surgical Wound:  Location: Buttocks    Consult ET nurse        Apply the following to wound:   Other: As previously done (frequency)    I certify that this patient is confined to his home and needs intermittent skilled nursing care.

## 2023-01-13 NOTE — TELEPHONE ENCOUNTER
Pt's son said everything is fine. He thought he had to talk to us to get HH restarted but they have contacted pt.   Scheduled D&C with hysteroscopy

## 2023-01-17 ENCOUNTER — TELEPHONE (OUTPATIENT)
Dept: SURGERY | Facility: CLINIC | Age: 88
End: 2023-01-17
Payer: COMMERCIAL

## 2023-01-17 NOTE — TELEPHONE ENCOUNTER
----- Message from Marga Dudley sent at 1/17/2023 11:57 AM CST -----  Contact: Lennie @ [phone]  Lennie from [facility] in [place] calling regarding Patient Advice (message) for #calling to get orders for the drain

## 2023-01-17 NOTE — TELEPHONE ENCOUNTER
Called IR for orders on drain. Spoke to Dr. Singletary who advised on the following: flush drain daily with 10 cc's NS, empty every 8 hrs and change dressing every 2-3 days (foam dressing is what patient has on right now). Jamila with HH says she will use that.

## 2023-01-19 ENCOUNTER — DOCUMENT SCAN (OUTPATIENT)
Dept: HOME HEALTH SERVICES | Facility: HOSPITAL | Age: 88
End: 2023-01-19
Payer: COMMERCIAL

## 2023-01-20 ENCOUNTER — EXTERNAL HOME HEALTH (OUTPATIENT)
Dept: HOME HEALTH SERVICES | Facility: HOSPITAL | Age: 88
End: 2023-01-20
Payer: COMMERCIAL

## 2023-01-25 ENCOUNTER — OFFICE VISIT (OUTPATIENT)
Dept: SURGERY | Facility: CLINIC | Age: 88
End: 2023-01-25
Payer: COMMERCIAL

## 2023-01-25 VITALS
SYSTOLIC BLOOD PRESSURE: 145 MMHG | DIASTOLIC BLOOD PRESSURE: 63 MMHG | HEIGHT: 72 IN | HEART RATE: 64 BPM | WEIGHT: 161 LBS | BODY MASS INDEX: 21.81 KG/M2

## 2023-01-25 DIAGNOSIS — Z48.89 ENCOUNTER FOR POSTOPERATIVE WOUND CHECK: Primary | ICD-10-CM

## 2023-01-25 PROCEDURE — 1101F PR PT FALLS ASSESS DOC 0-1 FALLS W/OUT INJ PAST YR: ICD-10-PCS | Mod: CPTII,S$GLB,, | Performed by: COLON & RECTAL SURGERY

## 2023-01-25 PROCEDURE — 1101F PT FALLS ASSESS-DOCD LE1/YR: CPT | Mod: CPTII,S$GLB,, | Performed by: COLON & RECTAL SURGERY

## 2023-01-25 PROCEDURE — 3288F PR FALLS RISK ASSESSMENT DOCUMENTED: ICD-10-PCS | Mod: CPTII,S$GLB,, | Performed by: COLON & RECTAL SURGERY

## 2023-01-25 PROCEDURE — 99024 POSTOP FOLLOW-UP VISIT: CPT | Mod: S$GLB,,, | Performed by: COLON & RECTAL SURGERY

## 2023-01-25 PROCEDURE — 1157F ADVNC CARE PLAN IN RCRD: CPT | Mod: CPTII,S$GLB,, | Performed by: COLON & RECTAL SURGERY

## 2023-01-25 PROCEDURE — 1159F MED LIST DOCD IN RCRD: CPT | Mod: CPTII,S$GLB,, | Performed by: COLON & RECTAL SURGERY

## 2023-01-25 PROCEDURE — 99999 PR PBB SHADOW E&M-EST. PATIENT-LVL IV: CPT | Mod: PBBFAC,,, | Performed by: COLON & RECTAL SURGERY

## 2023-01-25 PROCEDURE — 99024 PR POST-OP FOLLOW-UP VISIT: ICD-10-PCS | Mod: S$GLB,,, | Performed by: COLON & RECTAL SURGERY

## 2023-01-25 PROCEDURE — 1126F PR PAIN SEVERITY QUANTIFIED, NO PAIN PRESENT: ICD-10-PCS | Mod: CPTII,S$GLB,, | Performed by: COLON & RECTAL SURGERY

## 2023-01-25 PROCEDURE — 1159F PR MEDICATION LIST DOCUMENTED IN MEDICAL RECORD: ICD-10-PCS | Mod: CPTII,S$GLB,, | Performed by: COLON & RECTAL SURGERY

## 2023-01-25 PROCEDURE — 1157F PR ADVANCE CARE PLAN OR EQUIV PRESENT IN MEDICAL RECORD: ICD-10-PCS | Mod: CPTII,S$GLB,, | Performed by: COLON & RECTAL SURGERY

## 2023-01-25 PROCEDURE — 1126F AMNT PAIN NOTED NONE PRSNT: CPT | Mod: CPTII,S$GLB,, | Performed by: COLON & RECTAL SURGERY

## 2023-01-25 PROCEDURE — 99999 PR PBB SHADOW E&M-EST. PATIENT-LVL IV: ICD-10-PCS | Mod: PBBFAC,,, | Performed by: COLON & RECTAL SURGERY

## 2023-01-25 PROCEDURE — 3288F FALL RISK ASSESSMENT DOCD: CPT | Mod: CPTII,S$GLB,, | Performed by: COLON & RECTAL SURGERY

## 2023-01-25 NOTE — Clinical Note
Juan Silverio - wound healed, drain culture - sterile, no infection.  Hopefully postoperative perineal pain improves with time.  Probably restaging in 6 months?  Thanks Robert

## 2023-01-27 ENCOUNTER — TELEPHONE (OUTPATIENT)
Dept: SURGERY | Facility: CLINIC | Age: 88
End: 2023-01-27
Payer: COMMERCIAL

## 2023-01-27 NOTE — TELEPHONE ENCOUNTER
The nurse wanted to know if visits could be decreased to once a week. Informed her that is fine or could be d/c'd. She will go out again to check on pt.

## 2023-01-27 NOTE — TELEPHONE ENCOUNTER
----- Message from Bonilla Baker sent at 1/27/2023  3:01 PM CST -----  Contact: @[phone]  Caller home health nurse is calling in to see if the pt needs wound care after his procedure. Please call to discuss further.

## 2023-02-14 ENCOUNTER — DOCUMENT SCAN (OUTPATIENT)
Dept: HOME HEALTH SERVICES | Facility: HOSPITAL | Age: 88
End: 2023-02-14
Payer: COMMERCIAL

## 2023-03-03 ENCOUNTER — DOCUMENT SCAN (OUTPATIENT)
Dept: HOME HEALTH SERVICES | Facility: HOSPITAL | Age: 88
End: 2023-03-03
Payer: COMMERCIAL

## 2023-03-18 ENCOUNTER — DOCUMENT SCAN (OUTPATIENT)
Dept: HOME HEALTH SERVICES | Facility: HOSPITAL | Age: 88
End: 2023-03-18
Payer: COMMERCIAL

## 2023-03-20 ENCOUNTER — EXTERNAL HOME HEALTH (OUTPATIENT)
Dept: HOME HEALTH SERVICES | Facility: HOSPITAL | Age: 88
End: 2023-03-20
Payer: COMMERCIAL

## 2023-04-10 ENCOUNTER — DOCUMENT SCAN (OUTPATIENT)
Dept: HOME HEALTH SERVICES | Facility: HOSPITAL | Age: 88
End: 2023-04-10
Payer: COMMERCIAL

## 2023-04-14 NOTE — PROGRESS NOTES
CC:  Kam Delatorre is here today for New Patient (Ruptured ear drum)    Medications: medications verified and updated  Added preferred pharmacy  Denies  known Latex allergy or symptoms of Latex sensitivity.  All allergies and medications reviewed.  Patient would like communication of their results via:        Cell Phone:   Telephone Information:   Mobile [phone]     Okay to leave a message containing results? Yes      Rooming documentation of social history includes tobacco screening only.   HPI:  Prosper Soliz is a 88 y.o. male with history of pelvic fluid collection and perineal pain following APR for regrowth of rectal CA - initial watch and wait    Perineal wound dry.  Looks healed per son Victor M.      Pain essentially same.  Drainage minimal output from DUARTE bulb  No fevers.         Past Medical History:   Diagnosis Date    GERD without esophagitis     Hiatal hernia 05/18/2021    normal mucose in second part of the duodenum     Hypercholesterolemia     Hypertension     Hypothyroidism     Left groin hernia     Prostate cancer 1999    Rectal cancer 05/2021        Past Surgical History:   Procedure Laterality Date    APPENDECTOMY      COLONOSCOPY W/ BIOPSIES  05/18/2021    masss in the proximal rectum and mid rectum (biopsy)    ESOPHAGOGASTRODUODENOSCOPY  05/18/2021    PROSTATECTOMY  1999    ROBOT-ASSISTED LAPAROSCOPIC ABDOMINOPERINEAL RESECTION USING DA JOSEMANUEL XI N/A 12/15/2022    Procedure: XI ROBOTIC, PROCTECTOMY,  ABDOMINOPERINEAL (APR);  Surgeon: DANAE Jensen MD;  Location: [facility] OR 2ND FLR;  Service: Colon and Rectal;  Laterality: N/A;    ROBOT-ASSISTED LAPAROSCOPIC COMPLETION PROCTECTOMY USING DA JOSEMANUEL XI N/A 12/15/2022    Procedure: XI ROBOTIC, PROCTECTOMY;  Surgeon: DANAE Jensen MD;  Location: [facility] OR 2ND FLR;  Service: Colon and Rectal;  Laterality: N/A;       Review of patient's allergies indicates:  No Known Allergies    Family History   Problem Relation Age of Onset    Heart disease Mother     Stroke Mother     Lung cancer Father     Bone cancer Brother     Coronary artery disease Brother        Social History     Socioeconomic History    Marital status:    Tobacco Use    Smoking status: Never    Smokeless tobacco: Never   Substance and Sexual Activity    Alcohol use: Not Currently    Drug use: Never    Sexual activity: Not Currently       ROS:  GENERAL: No fever, chills, fatigability or weight loss.  Integument: No rashes, redness, icterus  CHEST: Denies WALKER, cyanosis, wheezing,  cough and sputum production.  CARDIOVASCULAR: Denies chest pain, PND, orthopnea or reduced exercise tolerance.  GI: Denies abd pain, dysphagia, nausea, vomiting, no hematemesis   : Denies burning on urination, no hematuria, no bacteriuria  MSK: No deformities, swelling, joint pain swelling  Neurologic: No HAs, seizures, weakness, paresthesias, gait problems    PE:  General appearance well  BP (!) 145/63 (BP Location: Left arm, Patient Position: Sitting, BP Method: Large (Automatic))   Pulse 64   Ht 6' (1.829 m)   Wt 73 kg (161 lb)   BMI 21.84 kg/m²   Sclera/ Skin anicteric  AT NC EOMI  Neck supple trachea midline   Chest symmetric, nl excursion, no retractions, breathing comfortably  Abdomen  ND soft NT.  no masses, no organomegaly  EXT - no CCE  Neuro:  Mood/ affect nl, alert and oriented x 3, moves all ext's, gait nl  Perineum    Wound epithelialized  No d/c.      Assessment:  Wound healed  Drain sterile - minimal output  Perineal pain - most likely postoperative incisional - no infection, wound healed.      Plan:  Drain removed.  D/c local wound care  Pt and son reassured that pain likely incisional and should improve with time  RTC 3 months

## 2023-04-24 ENCOUNTER — TELEPHONE (OUTPATIENT)
Dept: SURGERY | Facility: CLINIC | Age: 88
End: 2023-04-24
Payer: COMMERCIAL

## 2023-04-26 ENCOUNTER — OFFICE VISIT (OUTPATIENT)
Dept: SURGERY | Facility: CLINIC | Age: 88
End: 2023-04-26
Payer: COMMERCIAL

## 2023-04-26 VITALS
WEIGHT: 162.81 LBS | BODY MASS INDEX: 22.05 KG/M2 | DIASTOLIC BLOOD PRESSURE: 69 MMHG | SYSTOLIC BLOOD PRESSURE: 166 MMHG | HEART RATE: 51 BPM | HEIGHT: 72 IN

## 2023-04-26 DIAGNOSIS — Z08 ENCOUNTER FOR FOLLOW-UP SURVEILLANCE OF RECTAL CANCER: Primary | ICD-10-CM

## 2023-04-26 DIAGNOSIS — Z85.048 ENCOUNTER FOR FOLLOW-UP SURVEILLANCE OF RECTAL CANCER: Primary | ICD-10-CM

## 2023-04-26 PROCEDURE — 1126F PR PAIN SEVERITY QUANTIFIED, NO PAIN PRESENT: ICD-10-PCS | Mod: CPTII,S$GLB,, | Performed by: COLON & RECTAL SURGERY

## 2023-04-26 PROCEDURE — 3288F FALL RISK ASSESSMENT DOCD: CPT | Mod: CPTII,S$GLB,, | Performed by: COLON & RECTAL SURGERY

## 2023-04-26 PROCEDURE — 99999 PR PBB SHADOW E&M-EST. PATIENT-LVL IV: ICD-10-PCS | Mod: PBBFAC,,, | Performed by: COLON & RECTAL SURGERY

## 2023-04-26 PROCEDURE — 1159F PR MEDICATION LIST DOCUMENTED IN MEDICAL RECORD: ICD-10-PCS | Mod: CPTII,S$GLB,, | Performed by: COLON & RECTAL SURGERY

## 2023-04-26 PROCEDURE — 1101F PT FALLS ASSESS-DOCD LE1/YR: CPT | Mod: CPTII,S$GLB,, | Performed by: COLON & RECTAL SURGERY

## 2023-04-26 PROCEDURE — 3288F PR FALLS RISK ASSESSMENT DOCUMENTED: ICD-10-PCS | Mod: CPTII,S$GLB,, | Performed by: COLON & RECTAL SURGERY

## 2023-04-26 PROCEDURE — 99214 OFFICE O/P EST MOD 30 MIN: CPT | Mod: S$GLB,,, | Performed by: COLON & RECTAL SURGERY

## 2023-04-26 PROCEDURE — 1159F MED LIST DOCD IN RCRD: CPT | Mod: CPTII,S$GLB,, | Performed by: COLON & RECTAL SURGERY

## 2023-04-26 PROCEDURE — 1157F ADVNC CARE PLAN IN RCRD: CPT | Mod: CPTII,S$GLB,, | Performed by: COLON & RECTAL SURGERY

## 2023-04-26 PROCEDURE — 1126F AMNT PAIN NOTED NONE PRSNT: CPT | Mod: CPTII,S$GLB,, | Performed by: COLON & RECTAL SURGERY

## 2023-04-26 PROCEDURE — 99214 PR OFFICE/OUTPT VISIT, EST, LEVL IV, 30-39 MIN: ICD-10-PCS | Mod: S$GLB,,, | Performed by: COLON & RECTAL SURGERY

## 2023-04-26 PROCEDURE — 1101F PR PT FALLS ASSESS DOC 0-1 FALLS W/OUT INJ PAST YR: ICD-10-PCS | Mod: CPTII,S$GLB,, | Performed by: COLON & RECTAL SURGERY

## 2023-04-26 PROCEDURE — 99999 PR PBB SHADOW E&M-EST. PATIENT-LVL IV: CPT | Mod: PBBFAC,,, | Performed by: COLON & RECTAL SURGERY

## 2023-04-26 PROCEDURE — 1157F PR ADVANCE CARE PLAN OR EQUIV PRESENT IN MEDICAL RECORD: ICD-10-PCS | Mod: CPTII,S$GLB,, | Performed by: COLON & RECTAL SURGERY

## 2023-04-26 NOTE — PROGRESS NOTES
HPI:  Prosper Soliz is a 87 y.o. male with history of rectal CA.   Treated with HAYDEE with CCR.  Entered into watch and wait.    Developed regrowth > 1 year after      12-   APR for regrowth      12-  Interval hx:    POD 13.    Perineal d/c.  Serosanguinous.  No fever.    Minimal serous d/c from pelvic drain  Tolerating diet.  No      01-  CT urogram  COMPARISON:  CT abdomen pelvis with IV contrast 07/13/2022.     FINDINGS:  Motion on some images.     Precontrast images demonstrate no renal stones or hydronephrosis.  No abnormal parenchymal calcifications.     Postcontrast images demonstrate no hepatic abnormality.  No gallstones.  The spleen, pancreas, adrenal glands demonstrate no abnormality.     There is a 3 cm cyst lower pole right kidney.  There are no solid masses in the right kidney.  No filling defect identified in the right renal collecting system.     Several small cysts in the left kidney.  No solid masses in the left kidney.  No filling defects identified in the left renal collecting system.     No abnormality of urinary bladder.     There is no gross gastric abnormality.  There are no dilated loops of bowel.  Left lower quadrant diverting colostomy.     Changes of rectal resection and prostatectomy are noted.  There is a fluid collection at the resection cavity extending into the presacral region measuring approximately 6 x 6 cm in the axial plane and 14 cm in craniocaudal dimension (image 44 of series 700 sagittal).     There is no aortic aneurysm.  No lymph node enlargement.  The visualized lung bases demonstrate bilateral dependent atelectasis/scarring.  No osseous lesion.     Impression:     1. Changes of rectal resection and prostatectomy are noted.  There is a fluid collection at the resection cavity extending into the presacral region, measuring approximately 6 x 6 cm in the axial plane and 14 cm in craniocaudal dimension, could reflect a postoperative seroma or abscess.  2.  Otherwise, unremarkable examination.        Electronically signed by: Ten Bran MD  Date:                                            01/06/2023  Time:                                           17:01        1-  Interval hx  Reports persistent pain.  No fever.  Does not feel well.       4-  Interval hx:   Patient is seen with the son.  He is doing much better.  He is back to cutting his grass.  He reports that his perineal pain is much improved in his only sore from time to time.  There was no drainage from the wound.  The wound is completely healed.  Colostomy is functioning without difficulty.  He denies any bleeding from the fecal material.  He is having regular bowel movements.  They are formed.    He denies any abdominal pain.  He denies any changes in his appetite or weight.  He is gaining weight.  He has a good appetite.    Past Medical History:   Diagnosis Date    GERD without esophagitis     Hiatal hernia 05/18/2021    normal mucose in second part of the duodenum     Hypercholesterolemia     Hypertension     Hypothyroidism     Left groin hernia     Prostate cancer 1999    Rectal cancer 05/2021        Past Surgical History:   Procedure Laterality Date    APPENDECTOMY      COLONOSCOPY W/ BIOPSIES  05/18/2021    masss in the proximal rectum and mid rectum (biopsy)    ESOPHAGOGASTRODUODENOSCOPY  05/18/2021    PROSTATECTOMY  1999    ROBOT-ASSISTED LAPAROSCOPIC ABDOMINOPERINEAL RESECTION USING DA JOSEMANUEL XI N/A 12/15/2022    Procedure: XI ROBOTIC, PROCTECTOMY,  ABDOMINOPERINEAL (APR);  Surgeon: DANAE Jensen MD;  Location: [facility] OR [address] [place] [address];  Service: Colon and Rectal;  Laterality: N/A;    ROBOT-ASSISTED LAPAROSCOPIC COMPLETION PROCTECTOMY USING DA JOSEMANUEL XI N/A 12/15/2022    Procedure: XI ROBOTIC, PROCTECTOMY;  Surgeon: DANAE Jensen MD;  Location: [facility] OR [address] [place] [address];  Service: Colon and Rectal;  Laterality: N/A;       Review of patient's allergies indicates:  No Known Allergies    Family  History   Problem Relation Age of Onset    Heart disease Mother     Stroke Mother     Lung cancer Father     Bone cancer Brother     Coronary artery disease Brother        Social History     Socioeconomic History    Marital status:    Tobacco Use    Smoking status: Never    Smokeless tobacco: Never   Substance and Sexual Activity    Alcohol use: Not Currently    Drug use: Never    Sexual activity: Not Currently       ROS:  GENERAL: No fever, chills, fatigability or weight loss.  Integument: No rashes, redness, icterus  CHEST: Denies WALKER, cyanosis, wheezing, cough and sputum production.  CARDIOVASCULAR: Denies chest pain, PND, orthopnea or reduced exercise tolerance.  GI: Denies abd pain, dysphagia, nausea, vomiting, no hematemesis   : Denies burning on urination, no hematuria, no bacteriuria  MSK: No deformities, swelling, joint pain swelling  Neurologic: No HAs, seizures, weakness, paresthesias, gait problems    PE:  General appearance healthy elderly male in no acute distress  BP (!) 166/69 (BP Location: Left arm, Patient Position: Sitting, BP Method: Large (Automatic))   Pulse (!) 51   Ht 6' (1.829 m)   Wt 73.8 kg (162 lb 12.8 oz)   BMI 22.08 kg/m²     Sclera/ Skin anicteric  LN none palpable  AT NC EOMI  Neck supple trachea midline   Chest symmetric, nl excursion, no retractions, breathing comfortably  Abdomen    Well-healed incisions.  ND soft NT.  No masses, no organomegaly  EXT - no CCE  Neuro:  Mood/ affect nl, alert and oriented x 3, moves all ext's, gait nl  Perineum  Inspection     Wound healed.  No nodularity.  No masses      Assessment:  Perineal wound healed and pain improved  No evidence of recurrent rectal cancer  Excellent performance status    Plan:  Return to clinic in 3 months.  Imaging and CEA per Dr. Bar

## 2023-06-27 ENCOUNTER — PATIENT MESSAGE (OUTPATIENT)
Dept: SURGERY | Facility: CLINIC | Age: 88
End: 2023-06-27
Payer: COMMERCIAL

## 2023-06-29 ENCOUNTER — TELEPHONE (OUTPATIENT)
Dept: SURGERY | Facility: CLINIC | Age: 88
End: 2023-06-29
Payer: COMMERCIAL

## 2023-08-28 ENCOUNTER — TELEPHONE (OUTPATIENT)
Dept: SURGERY | Facility: CLINIC | Age: 88
End: 2023-08-28
Payer: COMMERCIAL

## 2023-08-28 NOTE — TELEPHONE ENCOUNTER
Spoke to David and he said he was calling about getting radiation records. He has already requested them through the portal. Told Saman his dad needs a f/u appt. Scheduled him to come in  Sept 13,2023. Asked how he is doing and he said his dad is doing good. Yesterday he was wanting to get out and cut grass but his son told him it was too how but overall he is doing good.

## 2023-08-28 NOTE — TELEPHONE ENCOUNTER
----- Message from Rhys Foster sent at 8/28/2023  8:35 AM CDT -----  Regarding: Pt advice  Contact: [phone]  Pt son is calling to request an call back from the provider would like to speak with the nurse on dad plan of care. No more information was given.

## 2023-09-07 ENCOUNTER — TELEPHONE (OUTPATIENT)
Dept: SURGERY | Facility: CLINIC | Age: 88
End: 2023-09-07
Payer: COMMERCIAL

## 2023-09-07 NOTE — TELEPHONE ENCOUNTER
LM to reschedule pts appt with Dr. Jensen on 9/13/23 to 8am that morning or for another day due to Dr. Jensen having the [facility]  interview that afternoon

## 2023-09-13 ENCOUNTER — LAB VISIT (OUTPATIENT)
Dept: LAB | Facility: HOSPITAL | Age: 88
End: 2023-09-13
Attending: COLON & RECTAL SURGERY
Payer: COMMERCIAL

## 2023-09-13 ENCOUNTER — OFFICE VISIT (OUTPATIENT)
Dept: SURGERY | Facility: CLINIC | Age: 88
End: 2023-09-13
Payer: COMMERCIAL

## 2023-09-13 VITALS
WEIGHT: 165.81 LBS | SYSTOLIC BLOOD PRESSURE: 172 MMHG | HEART RATE: 53 BPM | RESPIRATION RATE: 16 BRPM | HEIGHT: 72 IN | DIASTOLIC BLOOD PRESSURE: 75 MMHG | BODY MASS INDEX: 22.46 KG/M2

## 2023-09-13 DIAGNOSIS — Z08 ENCOUNTER FOR FOLLOW-UP SURVEILLANCE OF RECTAL CANCER: Primary | ICD-10-CM

## 2023-09-13 DIAGNOSIS — Z85.048 ENCOUNTER FOR FOLLOW-UP SURVEILLANCE OF RECTAL CANCER: Primary | ICD-10-CM

## 2023-09-13 DIAGNOSIS — C20 RECTAL CANCER: ICD-10-CM

## 2023-09-13 DIAGNOSIS — C20 RECTAL CANCER: Primary | ICD-10-CM

## 2023-09-13 LAB — CEA SERPL-MCNC: 1.7 NG/ML (ref 0–5)

## 2023-09-13 PROCEDURE — 99214 OFFICE O/P EST MOD 30 MIN: CPT | Mod: S$GLB,,, | Performed by: COLON & RECTAL SURGERY

## 2023-09-13 PROCEDURE — 82378 CARCINOEMBRYONIC ANTIGEN: CPT | Performed by: COLON & RECTAL SURGERY

## 2023-09-13 PROCEDURE — 3288F FALL RISK ASSESSMENT DOCD: CPT | Mod: CPTII,S$GLB,, | Performed by: COLON & RECTAL SURGERY

## 2023-09-13 PROCEDURE — 1159F PR MEDICATION LIST DOCUMENTED IN MEDICAL RECORD: ICD-10-PCS | Mod: CPTII,S$GLB,, | Performed by: COLON & RECTAL SURGERY

## 2023-09-13 PROCEDURE — 1101F PT FALLS ASSESS-DOCD LE1/YR: CPT | Mod: CPTII,S$GLB,, | Performed by: COLON & RECTAL SURGERY

## 2023-09-13 PROCEDURE — 1126F AMNT PAIN NOTED NONE PRSNT: CPT | Mod: CPTII,S$GLB,, | Performed by: COLON & RECTAL SURGERY

## 2023-09-13 PROCEDURE — 1126F PR PAIN SEVERITY QUANTIFIED, NO PAIN PRESENT: ICD-10-PCS | Mod: CPTII,S$GLB,, | Performed by: COLON & RECTAL SURGERY

## 2023-09-13 PROCEDURE — 99999 PR PBB SHADOW E&M-EST. PATIENT-LVL III: ICD-10-PCS | Mod: PBBFAC,,, | Performed by: COLON & RECTAL SURGERY

## 2023-09-13 PROCEDURE — 99999 PR PBB SHADOW E&M-EST. PATIENT-LVL III: CPT | Mod: PBBFAC,,, | Performed by: COLON & RECTAL SURGERY

## 2023-09-13 PROCEDURE — 99214 PR OFFICE/OUTPT VISIT, EST, LEVL IV, 30-39 MIN: ICD-10-PCS | Mod: S$GLB,,, | Performed by: COLON & RECTAL SURGERY

## 2023-09-13 PROCEDURE — 1157F ADVNC CARE PLAN IN RCRD: CPT | Mod: CPTII,S$GLB,, | Performed by: COLON & RECTAL SURGERY

## 2023-09-13 PROCEDURE — 3288F PR FALLS RISK ASSESSMENT DOCUMENTED: ICD-10-PCS | Mod: CPTII,S$GLB,, | Performed by: COLON & RECTAL SURGERY

## 2023-09-13 PROCEDURE — 1101F PR PT FALLS ASSESS DOC 0-1 FALLS W/OUT INJ PAST YR: ICD-10-PCS | Mod: CPTII,S$GLB,, | Performed by: COLON & RECTAL SURGERY

## 2023-09-13 PROCEDURE — 1157F PR ADVANCE CARE PLAN OR EQUIV PRESENT IN MEDICAL RECORD: ICD-10-PCS | Mod: CPTII,S$GLB,, | Performed by: COLON & RECTAL SURGERY

## 2023-09-13 PROCEDURE — 36415 COLL VENOUS BLD VENIPUNCTURE: CPT | Performed by: COLON & RECTAL SURGERY

## 2023-09-13 PROCEDURE — 1159F MED LIST DOCD IN RCRD: CPT | Mod: CPTII,S$GLB,, | Performed by: COLON & RECTAL SURGERY

## 2023-09-13 NOTE — LETTER
This communication is flagged as high priority.      September 13, 2023      Bartolo Bernal MD             [facility]  [address] [place] [id number]  Phone: [phone]  Fax: [phone]   Patient: Prosper Soliz   MR Number: [id number]   YOB: 1935   Date of Visit: 9/13/2023       Dear Dr. Bernal:     Pt needs surveillance colonoscopy for personal history of rectal cancer.  Could your office please schedule for this Fall.         If you have questions, please do not hesitate to call me. I look forward to following Prosper along with you.    Sincerely,      DANAE Jensen MD           CC    No Recipients

## 2023-09-13 NOTE — PROGRESS NOTES
HPI:  Prosper Soliz is a 88 y.o. male with history of rectal CA    Doing well.  No pain.  No problems with stoma.  No BRB or change in bowel habit  Appetite good, no wt loss.    Back to cutting grass and performing all activities without restriction       Past Medical History:   Diagnosis Date    GERD without esophagitis     Hiatal hernia 05/18/2021    normal mucose in second part of the duodenum     Hypercholesterolemia     Hypertension     Hypothyroidism     Left groin hernia     Prostate cancer 1999    Rectal cancer 05/2021        Past Surgical History:   Procedure Laterality Date    APPENDECTOMY      COLONOSCOPY W/ BIOPSIES  05/18/2021    masss in the proximal rectum and mid rectum (biopsy)    ESOPHAGOGASTRODUODENOSCOPY  05/18/2021    PROSTATECTOMY  1999    ROBOT-ASSISTED LAPAROSCOPIC ABDOMINOPERINEAL RESECTION USING DA JOSEMANUEL XI N/A 12/15/2022    Procedure: XI ROBOTIC, PROCTECTOMY,  ABDOMINOPERINEAL (APR);  Surgeon: DANAE Jensen MD;  Location: [facility] OR 2ND FLR;  Service: Colon and Rectal;  Laterality: N/A;    ROBOT-ASSISTED LAPAROSCOPIC COMPLETION PROCTECTOMY USING DA JOSEMANUEL XI N/A 12/15/2022    Procedure: XI ROBOTIC, PROCTECTOMY;  Surgeon: DANAE Jensen MD;  Location: [facility] OR 2ND FLR;  Service: Colon and Rectal;  Laterality: N/A;       Review of patient's allergies indicates:  No Known Allergies    Family History   Problem Relation Age of Onset    Heart disease Mother     Stroke Mother     Lung cancer Father     Bone cancer Brother     Coronary artery disease Brother        Social History     Socioeconomic History    Marital status:    Tobacco Use    Smoking status: Never    Smokeless tobacco: Never   Substance and Sexual Activity    Alcohol use: Not Currently    Drug use: Never    Sexual activity: Not Currently       ROS:  GENERAL: No fever, chills, fatigability or weight loss.  Integument: No rashes, redness, icterus  CHEST: Denies WALKER, cyanosis, wheezing, cough and sputum  production.  CARDIOVASCULAR: Denies chest pain, PND, orthopnea or reduced exercise tolerance.  GI: Denies abd pain, dysphagia, nausea, vomiting, no hematemesis   : Denies burning on urination, no hematuria, no bacteriuria  MSK: No deformities, swelling, joint pain swelling  Neurologic: No HAs, seizures, weakness, paresthesias, gait problems    PE:  General appearance well  BP (!) 172/75 (BP Location: Left arm, Patient Position: Sitting, BP Method: Large (Automatic))   Pulse (!) 53   Resp 16   Ht 6' (1.829 m)   Wt 75.2 kg (165 lb 12.6 oz)   BMI 22.48 kg/m²   Sclera/ Skin anicteric  LN none palpable  AT NC EOMI  Neck supple trachea midline   Chest symmetric, nl excursion, no retractions, breathing comfortably  Abdomen stoma L side of abd.    ND soft NT.  no masses, no organomegaly  EXT - no CCE  Neuro:  Mood/ affect nl, alert and oriented x 3, moves all ext's, gait nl    Perineal wound - no masses.  No hernia.  Wound healed      Assessment:  Locally advanced low rectal CA s/p HAYDEE and APR for local regrowth  Good functional status  No signs of recurrence    Plan:  Surveillance colonoscopy - pt to contact Dr Bernal  Imaging per Dr Bar  F/u 3 months  Check CEA today

## 2023-09-24 ENCOUNTER — PATIENT MESSAGE (OUTPATIENT)
Dept: SURGERY | Facility: CLINIC | Age: 88
End: 2023-09-24
Payer: COMMERCIAL
